# Patient Record
Sex: FEMALE | Race: WHITE | NOT HISPANIC OR LATINO | Employment: STUDENT | URBAN - METROPOLITAN AREA
[De-identification: names, ages, dates, MRNs, and addresses within clinical notes are randomized per-mention and may not be internally consistent; named-entity substitution may affect disease eponyms.]

---
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
## 2019-01-01 LAB
BACTERIA UR CULT: ABNORMAL
BACTERIA UR CULT: ABNORMAL
OTHER ANTIBIOTIC SUSC ISLT: ABNORMAL

## 2019-01-02 RX ORDER — AMOXICILLIN AND CLAVULANATE POTASSIUM 875; 125 MG/1; MG/1
1 TABLET, FILM COATED ORAL EVERY 12 HOURS
Qty: 20 TABLET | Refills: 0 | Status: SHIPPED | OUTPATIENT
Start: 2019-01-02 | End: 2019-01-12

## 2019-01-02 RX ORDER — NITROFURANTOIN 25; 75 MG/1; MG/1
100 CAPSULE ORAL NIGHTLY
Qty: 30 CAPSULE | Refills: 1 | Status: SHIPPED | OUTPATIENT
Start: 2019-01-02 | End: 2019-02-01

## 2019-01-02 NOTE — TELEPHONE ENCOUNTER
Also, her urine culture was positive and not sensitive to our usual antibiotic. I would recommend a prolonged course of aumgentin followed by nightly suppression as this is her second positive urine culture with Macrobid (rx sent) Will send Rx to pharmacy

## 2019-01-10 NOTE — TELEPHONE ENCOUNTER
Patient was informed of urine culture and medications instructions. Patient also informed of STD results, medication and instructions.

## 2019-01-25 ENCOUNTER — ROUTINE PRENATAL (OUTPATIENT)
Dept: OBSTETRICS AND GYNECOLOGY | Facility: CLINIC | Age: 19
End: 2019-01-25

## 2019-01-25 VITALS — WEIGHT: 112 LBS | DIASTOLIC BLOOD PRESSURE: 76 MMHG | BODY MASS INDEX: 21.87 KG/M2 | SYSTOLIC BLOOD PRESSURE: 119 MMHG

## 2019-01-25 DIAGNOSIS — R82.71 ASYMPTOMATIC BACTERIURIA DURING PREGNANCY: Primary | ICD-10-CM

## 2019-01-25 DIAGNOSIS — O99.891 ASYMPTOMATIC BACTERIURIA DURING PREGNANCY: Primary | ICD-10-CM

## 2019-01-25 PROCEDURE — 99213 OFFICE O/P EST LOW 20 MIN: CPT | Performed by: OBSTETRICS & GYNECOLOGY

## 2019-01-25 NOTE — PROGRESS NOTES
Chief Complaint   Patient presents with   • Routine Prenatal Visit      Julian Fleming is a 18 y.o.  at 26w0d   Patient denies vaginal bleeding, leakage of fluid.  denies contractions.  Notes normal fetal movements     /76   Wt 50.8 kg (112 lb)   LMP 2018 (Exact Date)   BMI 21.87 kg/m²    Abd: gravid, nontender  See OB Flowsheet    ASSESSMENT:   1. IUP at 26w0d   2. H/o UTI x2, on suppression  3. Chlamydia, plan MICHELE next visit  4. Low weight gain  PLAN:  Reviewed common second trimester symptoms.  Reviewed precautions for signs of  labor, anticipated fetal movements.   Reviewed kick counts starting at 28 weeks  GCT today  Tdap next visit  Reviewed recommended caloric intake in pregnancy/weight gain.  Fundal height is appropriate at this time  MICHELE for chlamydia next visit. Pt confirms that she has taken azithromycin and treatment for UTI.    Return in about 4 weeks (around 2019).  Orders Placed This Encounter   Procedures   • Urine Culture - Urine, Urine, Clean Catch

## 2019-01-26 LAB
ERYTHROCYTE [DISTWIDTH] IN BLOOD BY AUTOMATED COUNT: 14 % (ref 11.7–13)
GLUCOSE 1H P 50 G GLC PO SERPL-MCNC: 89 MG/DL (ref 65–139)
HCT VFR BLD AUTO: 31.4 % (ref 35.6–45.5)
HGB BLD-MCNC: 9.8 G/DL (ref 11.9–15.5)
MCH RBC QN AUTO: 30 PG (ref 26.9–32)
MCHC RBC AUTO-ENTMCNC: 31.2 G/DL (ref 32.4–36.3)
MCV RBC AUTO: 96 FL (ref 80.5–98.2)
PLATELET # BLD AUTO: 656 10*3/MM3 (ref 140–500)
RBC # BLD AUTO: 3.27 10*6/MM3 (ref 3.9–5.2)
WBC # BLD AUTO: 10.25 10*3/MM3 (ref 4.5–10.7)

## 2019-01-27 LAB
BACTERIA UR CULT: NO GROWTH
BACTERIA UR CULT: NORMAL

## 2019-01-29 ENCOUNTER — TELEPHONE (OUTPATIENT)
Dept: OBSTETRICS AND GYNECOLOGY | Facility: CLINIC | Age: 19
End: 2019-01-29

## 2019-01-29 RX ORDER — FERROUS SULFATE 325(65) MG
325 TABLET ORAL
Qty: 30 TABLET | Refills: 5 | Status: SHIPPED | OUTPATIENT
Start: 2019-01-29 | End: 2019-05-04 | Stop reason: HOSPADM

## 2019-01-29 RX ORDER — DOCUSATE SODIUM 100 MG/1
100 CAPSULE, LIQUID FILLED ORAL 2 TIMES DAILY
Qty: 60 CAPSULE | Refills: 1 | Status: SHIPPED | OUTPATIENT
Start: 2019-01-29 | End: 2019-04-18

## 2019-01-29 NOTE — TELEPHONE ENCOUNTER
Please let patient know that her GCT was normal but her blood count is a bit low. Recommend Iron supplementation. Rx sent.  Iron can be associated with constipation so I would recommend a stool softener which I will send to pharmacy as well.

## 2019-01-29 NOTE — TELEPHONE ENCOUNTER
----- Message from Pat Crowell MD sent at 1/29/2019  8:26 AM EST -----  Urine culture was negative but should continue daily antibiotics. Thanks!    01/29/19  Called patient to inform results, no answer. Left a message to return call.    -called pt again. Pt has been informed of urine cultures result and to continue abx. Pt also informed of gct and iron supplementation.

## 2019-02-06 ENCOUNTER — TELEPHONE (OUTPATIENT)
Dept: OBSTETRICS AND GYNECOLOGY | Facility: CLINIC | Age: 19
End: 2019-02-06

## 2019-02-06 RX ORDER — ONDANSETRON 4 MG/1
4 TABLET, FILM COATED ORAL DAILY PRN
Qty: 10 TABLET | Refills: 0 | Status: SHIPPED | OUTPATIENT
Start: 2019-02-06 | End: 2019-05-04 | Stop reason: HOSPADM

## 2019-02-06 NOTE — TELEPHONE ENCOUNTER
Patient's mother calling.  States patient is at work and feels like she's going to pass out.  Also complaining that her legs hurt and they are tingly.  Patient had a similar episode on kelly arcenio.  Please advise.

## 2019-02-06 NOTE — TELEPHONE ENCOUNTER
If pt is feeling badly, she should rest and hydrate.  Recommend sitting down for at least 20 minutes and drinking at least 12 oz of water.  Change from sitting position to standing in a slow fashion as if it is due to your blood pressure lowering rapid changes in position can worsen symptoms.  If she does not feel better, she may be offered an appt this afternoon or Friday or if symptoms merit immediate evaluation, go to L&D. Thanks!

## 2019-02-06 NOTE — TELEPHONE ENCOUNTER
Patient's mother aware and will pass along all information to patient.  Patient has been vomiting within the past two hours.  States nausea medication she was given is not helping - B6 and doxylamine I think?  Is there anything else she can try?

## 2019-02-08 ENCOUNTER — ROUTINE PRENATAL (OUTPATIENT)
Dept: OBSTETRICS AND GYNECOLOGY | Facility: CLINIC | Age: 19
End: 2019-02-08

## 2019-02-08 ENCOUNTER — PROCEDURE VISIT (OUTPATIENT)
Dept: OBSTETRICS AND GYNECOLOGY | Facility: CLINIC | Age: 19
End: 2019-02-08

## 2019-02-08 VITALS — DIASTOLIC BLOOD PRESSURE: 77 MMHG | BODY MASS INDEX: 21.68 KG/M2 | SYSTOLIC BLOOD PRESSURE: 127 MMHG | WEIGHT: 111 LBS

## 2019-02-08 DIAGNOSIS — Z34.03 ENCOUNTER FOR SUPERVISION OF NORMAL FIRST PREGNANCY IN THIRD TRIMESTER: Primary | ICD-10-CM

## 2019-02-08 DIAGNOSIS — O26.843 FUNDAL HEIGHT LOW FOR DATES IN THIRD TRIMESTER: Primary | ICD-10-CM

## 2019-02-08 PROCEDURE — 76816 OB US FOLLOW-UP PER FETUS: CPT | Performed by: OBSTETRICS & GYNECOLOGY

## 2019-02-08 PROCEDURE — 99213 OFFICE O/P EST LOW 20 MIN: CPT | Performed by: OBSTETRICS & GYNECOLOGY

## 2019-02-08 NOTE — PROGRESS NOTES
Chief Complaint   Patient presents with   • Pregnancy Problem     pt reports vomiting, light headed and hot.      HR: 128 Temp: 99.1 F    Julian Fleming is a 18 y.o.  at 28w0d here for problem visit in pregnancy secondary to nausea  Reports that she woke up day feeling ill.  She started throwing up on Wednesday and had decreased appetite.  Reports that she has no known sick contacts and no diarrhea.    Had temp of 101 yesterday but did not call or go for evaluation.  Feels much better today and yesterday compared to Wednesday. Drank lots of water yesterday and able to eat crackers  /77   Wt 50.3 kg (111 lb)   LMP 2018 (Exact Date)   BMI 21.68 kg/m²    Abd: gravid, nontender  See OB Flowsheet    ASSESSMENT:   1. IUP at 28w0d   2. Vomiting in pregnancy  3. Tachycardia, improved after rest with repeat HR of 96  PLAN:  Reviewed with patient given short duration of symptoms suspect this might be a viral gastroenteritis.  If she were to have worsening symptoms, fever, blood in emesis, inability to tolerate liquids or other concerning findings, recommend reevaluation.  Ultrasound today for size less than dates and discrepancy from last fundal height showed EFW of 36.2% but AC 13.4%, repeat in 4 weeks.  Pt counseled on hygiene precautions  Recheck Chlamydia at next visit  Tdap next visit  Reviewed common second trimester symptoms.  Reviewed precautions for signs of  labor, anticipated fetal movements.    Follow up as sheduled  No orders of the defined types were placed in this encounter.

## 2019-02-09 ENCOUNTER — HOSPITAL ENCOUNTER (EMERGENCY)
Facility: HOSPITAL | Age: 19
End: 2019-02-09

## 2019-02-09 ENCOUNTER — HOSPITAL ENCOUNTER (INPATIENT)
Facility: HOSPITAL | Age: 19
LOS: 3 days | Discharge: HOME | End: 2019-02-12
Attending: OBSTETRICS & GYNECOLOGY | Admitting: OBSTETRICS & GYNECOLOGY

## 2019-02-09 PROBLEM — Z34.90 PREGNANCY: Status: ACTIVE | Noted: 2019-02-09

## 2019-02-09 LAB
ALBUMIN SERPL-MCNC: 3.2 G/DL (ref 3.5–5.2)
ALBUMIN/GLOB SERPL: 1 G/DL
ALP SERPL-CCNC: 76 U/L (ref 39–117)
ALT SERPL W P-5'-P-CCNC: 6 U/L (ref 1–33)
AMPHET+METHAMPHET UR QL: NEGATIVE
ANION GAP SERPL CALCULATED.3IONS-SCNC: 13.7 MMOL/L
AST SERPL-CCNC: 9 U/L (ref 1–32)
BACTERIA UR QL AUTO: ABNORMAL /HPF
BARBITURATES UR QL SCN: NEGATIVE
BASOPHILS # BLD AUTO: 0.03 10*3/MM3 (ref 0–0.2)
BASOPHILS NFR BLD AUTO: 0.2 % (ref 0–1.5)
BENZODIAZ UR QL SCN: NEGATIVE
BILIRUB SERPL-MCNC: 0.3 MG/DL (ref 0.1–1.2)
BILIRUB UR QL STRIP: NEGATIVE
BUN BLD-MCNC: 5 MG/DL (ref 6–20)
BUN/CREAT SERPL: 10.4 (ref 7–25)
CALCIUM SPEC-SCNC: 8.3 MG/DL (ref 8.6–10.5)
CANNABINOIDS SERPL QL: NEGATIVE
CHLORIDE SERPL-SCNC: 97 MMOL/L (ref 98–107)
CLARITY UR: ABNORMAL
CO2 SERPL-SCNC: 22.3 MMOL/L (ref 22–29)
COCAINE UR QL: NEGATIVE
COLOR UR: ABNORMAL
CREAT BLD-MCNC: 0.48 MG/DL (ref 0.57–1)
DEPRECATED RDW RBC AUTO: 47.9 FL (ref 37–54)
EOSINOPHIL # BLD AUTO: 0.09 10*3/MM3 (ref 0–0.7)
EOSINOPHIL NFR BLD AUTO: 0.6 % (ref 0.3–6.2)
ERYTHROCYTE [DISTWIDTH] IN BLOOD BY AUTOMATED COUNT: 13.9 % (ref 11.7–13)
GFR SERPL CREATININE-BSD FRML MDRD: >150 ML/MIN/1.73
GLOBULIN UR ELPH-MCNC: 3.2 GM/DL
GLUCOSE BLD-MCNC: 82 MG/DL (ref 65–99)
GLUCOSE UR STRIP-MCNC: NEGATIVE MG/DL
HCT VFR BLD AUTO: 25.9 % (ref 35.6–45.5)
HGB BLD-MCNC: 8.2 G/DL (ref 11.9–15.5)
HGB UR QL STRIP.AUTO: NEGATIVE
HYALINE CASTS UR QL AUTO: ABNORMAL /LPF
IMM GRANULOCYTES # BLD AUTO: 0.09 10*3/MM3 (ref 0–0.03)
IMM GRANULOCYTES NFR BLD AUTO: 0.6 % (ref 0–0.5)
KETONES UR QL STRIP: ABNORMAL
LEUKOCYTE ESTERASE UR QL STRIP.AUTO: ABNORMAL
LYMPHOCYTES # BLD AUTO: 1.66 10*3/MM3 (ref 0.9–4.8)
LYMPHOCYTES NFR BLD AUTO: 10.4 % (ref 19.6–45.3)
MCH RBC QN AUTO: 29.8 PG (ref 26.9–32)
MCHC RBC AUTO-ENTMCNC: 31.7 G/DL (ref 32.4–36.3)
MCV RBC AUTO: 94.2 FL (ref 80.5–98.2)
METHADONE UR QL SCN: NEGATIVE
MONOCYTES # BLD AUTO: 2.22 10*3/MM3 (ref 0.2–1.2)
MONOCYTES NFR BLD AUTO: 14 % (ref 5–12)
NEUTROPHILS # BLD AUTO: 11.8 10*3/MM3 (ref 1.9–8.1)
NEUTROPHILS NFR BLD AUTO: 74.2 % (ref 42.7–76)
NITRITE UR QL STRIP: POSITIVE
OPIATES UR QL: NEGATIVE
OXYCODONE UR QL SCN: NEGATIVE
PH UR STRIP.AUTO: 6 [PH] (ref 5–8)
PLATELET # BLD AUTO: 209 10*3/MM3 (ref 140–500)
PMV BLD AUTO: 9.9 FL (ref 6–12)
POTASSIUM BLD-SCNC: 3.1 MMOL/L (ref 3.5–5.2)
PROT SERPL-MCNC: 6.4 G/DL (ref 6–8.5)
PROT UR QL STRIP: ABNORMAL
RBC # BLD AUTO: 2.75 10*6/MM3 (ref 3.9–5.2)
RBC # UR: ABNORMAL /HPF
REF LAB TEST METHOD: ABNORMAL
SODIUM BLD-SCNC: 133 MMOL/L (ref 136–145)
SP GR UR STRIP: 1.02 (ref 1–1.03)
SQUAMOUS #/AREA URNS HPF: ABNORMAL /HPF
UROBILINOGEN UR QL STRIP: ABNORMAL
WBC NRBC COR # BLD: 15.89 10*3/MM3 (ref 4.5–10.7)
WBC UR QL AUTO: ABNORMAL /HPF

## 2019-02-09 PROCEDURE — 80307 DRUG TEST PRSMV CHEM ANLYZR: CPT | Performed by: OBSTETRICS & GYNECOLOGY

## 2019-02-09 PROCEDURE — 87186 SC STD MICRODIL/AGAR DIL: CPT | Performed by: OBSTETRICS & GYNECOLOGY

## 2019-02-09 PROCEDURE — 87086 URINE CULTURE/COLONY COUNT: CPT | Performed by: OBSTETRICS & GYNECOLOGY

## 2019-02-09 PROCEDURE — 85025 COMPLETE CBC W/AUTO DIFF WBC: CPT | Performed by: OBSTETRICS & GYNECOLOGY

## 2019-02-09 PROCEDURE — G0378 HOSPITAL OBSERVATION PER HR: HCPCS

## 2019-02-09 PROCEDURE — 87077 CULTURE AEROBIC IDENTIFY: CPT | Performed by: OBSTETRICS & GYNECOLOGY

## 2019-02-09 PROCEDURE — 80053 COMPREHEN METABOLIC PANEL: CPT | Performed by: OBSTETRICS & GYNECOLOGY

## 2019-02-09 PROCEDURE — 81001 URINALYSIS AUTO W/SCOPE: CPT | Performed by: OBSTETRICS & GYNECOLOGY

## 2019-02-09 RX ORDER — SODIUM CHLORIDE 0.9 % (FLUSH) 0.9 %
5 SYRINGE (ML) INJECTION AS NEEDED
Status: DISCONTINUED | OUTPATIENT
Start: 2019-02-09 | End: 2019-02-12 | Stop reason: HOSPADM

## 2019-02-09 RX ORDER — SODIUM CHLORIDE 0.9 % (FLUSH) 0.9 %
3 SYRINGE (ML) INJECTION EVERY 12 HOURS SCHEDULED
Status: DISCONTINUED | OUTPATIENT
Start: 2019-02-10 | End: 2019-02-12 | Stop reason: HOSPADM

## 2019-02-09 RX ORDER — SODIUM CHLORIDE, SODIUM LACTATE, POTASSIUM CHLORIDE, CALCIUM CHLORIDE 600; 310; 30; 20 MG/100ML; MG/100ML; MG/100ML; MG/100ML
125 INJECTION, SOLUTION INTRAVENOUS CONTINUOUS
Status: DISCONTINUED | OUTPATIENT
Start: 2019-02-10 | End: 2019-02-11

## 2019-02-09 RX ADMIN — SODIUM CHLORIDE, POTASSIUM CHLORIDE, SODIUM LACTATE AND CALCIUM CHLORIDE 125 ML/HR: 600; 310; 30; 20 INJECTION, SOLUTION INTRAVENOUS at 23:38

## 2019-02-10 PROBLEM — Z34.90 PREGNANCY: Status: RESOLVED | Noted: 2019-02-09 | Resolved: 2019-02-10

## 2019-02-10 PROBLEM — O23.03 PYELONEPHRITIS AFFECTING PREGNANCY IN THIRD TRIMESTER: Status: ACTIVE | Noted: 2019-02-10

## 2019-02-10 LAB
ANION GAP SERPL CALCULATED.3IONS-SCNC: 14.5 MMOL/L
BASOPHILS # BLD AUTO: 0.03 10*3/MM3 (ref 0–0.2)
BASOPHILS NFR BLD AUTO: 0.2 % (ref 0–1.5)
BUN BLD-MCNC: 5 MG/DL (ref 6–20)
BUN/CREAT SERPL: 10.6 (ref 7–25)
CALCIUM SPEC-SCNC: 8.6 MG/DL (ref 8.6–10.5)
CHLORIDE SERPL-SCNC: 100 MMOL/L (ref 98–107)
CO2 SERPL-SCNC: 21.5 MMOL/L (ref 22–29)
CREAT BLD-MCNC: 0.47 MG/DL (ref 0.57–1)
DEPRECATED RDW RBC AUTO: 47.7 FL (ref 37–54)
EOSINOPHIL # BLD AUTO: 0.08 10*3/MM3 (ref 0–0.7)
EOSINOPHIL NFR BLD AUTO: 0.5 % (ref 0.3–6.2)
ERYTHROCYTE [DISTWIDTH] IN BLOOD BY AUTOMATED COUNT: 13.8 % (ref 11.7–13)
GFR SERPL CREATININE-BSD FRML MDRD: >150 ML/MIN/1.73
GLUCOSE BLD-MCNC: 72 MG/DL (ref 65–99)
HCT VFR BLD AUTO: 26.5 % (ref 35.6–45.5)
HGB BLD-MCNC: 8.7 G/DL (ref 11.9–15.5)
IMM GRANULOCYTES # BLD AUTO: 0.09 10*3/MM3 (ref 0–0.03)
IMM GRANULOCYTES NFR BLD AUTO: 0.6 % (ref 0–0.5)
LYMPHOCYTES # BLD AUTO: 1.26 10*3/MM3 (ref 0.9–4.8)
LYMPHOCYTES NFR BLD AUTO: 8.2 % (ref 19.6–45.3)
MCH RBC QN AUTO: 30.7 PG (ref 26.9–32)
MCHC RBC AUTO-ENTMCNC: 32.8 G/DL (ref 32.4–36.3)
MCV RBC AUTO: 93.6 FL (ref 80.5–98.2)
MONOCYTES # BLD AUTO: 1.68 10*3/MM3 (ref 0.2–1.2)
MONOCYTES NFR BLD AUTO: 10.9 % (ref 5–12)
NEUTROPHILS # BLD AUTO: 12.21 10*3/MM3 (ref 1.9–8.1)
NEUTROPHILS NFR BLD AUTO: 79.6 % (ref 42.7–76)
PLATELET # BLD AUTO: 229 10*3/MM3 (ref 140–500)
PMV BLD AUTO: 10.1 FL (ref 6–12)
POTASSIUM BLD-SCNC: 3.4 MMOL/L (ref 3.5–5.2)
RBC # BLD AUTO: 2.83 10*6/MM3 (ref 3.9–5.2)
SODIUM BLD-SCNC: 136 MMOL/L (ref 136–145)
WBC NRBC COR # BLD: 15.35 10*3/MM3 (ref 4.5–10.7)

## 2019-02-10 PROCEDURE — 25010000002 AMPICILLIN PER 500 MG: Performed by: OBSTETRICS & GYNECOLOGY

## 2019-02-10 PROCEDURE — 59025 FETAL NON-STRESS TEST: CPT

## 2019-02-10 PROCEDURE — 59025 FETAL NON-STRESS TEST: CPT | Performed by: OBSTETRICS & GYNECOLOGY

## 2019-02-10 PROCEDURE — 63710000001 DIPHENHYDRAMINE PER 50 MG: Performed by: OBSTETRICS & GYNECOLOGY

## 2019-02-10 PROCEDURE — 25010000003 CEFAZOLIN 1-4 GM/50ML-% SOLUTION: Performed by: OBSTETRICS & GYNECOLOGY

## 2019-02-10 PROCEDURE — 80048 BASIC METABOLIC PNL TOTAL CA: CPT | Performed by: OBSTETRICS & GYNECOLOGY

## 2019-02-10 PROCEDURE — 85025 COMPLETE CBC W/AUTO DIFF WBC: CPT | Performed by: OBSTETRICS & GYNECOLOGY

## 2019-02-10 PROCEDURE — 99233 SBSQ HOSP IP/OBS HIGH 50: CPT | Performed by: OBSTETRICS & GYNECOLOGY

## 2019-02-10 RX ORDER — POTASSIUM CHLORIDE 750 MG/1
40 CAPSULE, EXTENDED RELEASE ORAL EVERY 6 HOURS
Status: COMPLETED | OUTPATIENT
Start: 2019-02-10 | End: 2019-02-10

## 2019-02-10 RX ORDER — PRENATAL VIT NO.126/IRON/FOLIC 28MG-0.8MG
1 TABLET ORAL DAILY
Status: DISCONTINUED | OUTPATIENT
Start: 2019-02-10 | End: 2019-02-12 | Stop reason: HOSPADM

## 2019-02-10 RX ORDER — DIPHENHYDRAMINE HCL 25 MG
25 CAPSULE ORAL ONCE
Status: COMPLETED | OUTPATIENT
Start: 2019-02-10 | End: 2019-02-10

## 2019-02-10 RX ORDER — DOCUSATE SODIUM 100 MG/1
100 CAPSULE, LIQUID FILLED ORAL DAILY PRN
Status: DISCONTINUED | OUTPATIENT
Start: 2019-02-10 | End: 2019-02-12 | Stop reason: HOSPADM

## 2019-02-10 RX ORDER — DIPHENHYDRAMINE HCL 25 MG
25 CAPSULE ORAL EVERY 6 HOURS PRN
Status: DISCONTINUED | OUTPATIENT
Start: 2019-02-10 | End: 2019-02-12 | Stop reason: HOSPADM

## 2019-02-10 RX ORDER — DOXYLAMINE SUCCINATE AND PYRIDOXINE HYDROCHLORIDE, DELAYED RELEASE TABLETS 10 MG/10 MG 10; 10 MG/1; MG/1
2 TABLET, DELAYED RELEASE ORAL 3 TIMES DAILY PRN
Status: DISCONTINUED | OUTPATIENT
Start: 2019-02-10 | End: 2019-02-12 | Stop reason: HOSPADM

## 2019-02-10 RX ORDER — CEFAZOLIN SODIUM 1 G/50ML
1 INJECTION, SOLUTION INTRAVENOUS EVERY 8 HOURS
Status: DISCONTINUED | OUTPATIENT
Start: 2019-02-10 | End: 2019-02-12 | Stop reason: HOSPADM

## 2019-02-10 RX ORDER — FERROUS SULFATE 325(65) MG
325 TABLET ORAL
Status: DISCONTINUED | OUTPATIENT
Start: 2019-02-10 | End: 2019-02-12 | Stop reason: HOSPADM

## 2019-02-10 RX ORDER — ONDANSETRON 4 MG/1
4 TABLET, FILM COATED ORAL DAILY PRN
Status: DISCONTINUED | OUTPATIENT
Start: 2019-02-10 | End: 2019-02-12 | Stop reason: HOSPADM

## 2019-02-10 RX ORDER — SACCHAROMYCES BOULARDII 250 MG
250 CAPSULE ORAL 2 TIMES DAILY
Status: DISCONTINUED | OUTPATIENT
Start: 2019-02-10 | End: 2019-02-12 | Stop reason: HOSPADM

## 2019-02-10 RX ORDER — ACETAMINOPHEN 325 MG/1
650 TABLET ORAL EVERY 6 HOURS PRN
Status: DISCONTINUED | OUTPATIENT
Start: 2019-02-10 | End: 2019-02-12 | Stop reason: HOSPADM

## 2019-02-10 RX ADMIN — POTASSIUM CHLORIDE 40 MEQ: 750 CAPSULE, EXTENDED RELEASE ORAL at 17:56

## 2019-02-10 RX ADMIN — CEFAZOLIN SODIUM 1 G: 1 INJECTION, SOLUTION INTRAVENOUS at 13:05

## 2019-02-10 RX ADMIN — Medication 250 MG: at 15:20

## 2019-02-10 RX ADMIN — DIPHENHYDRAMINE HYDROCHLORIDE 25 MG: 25 CAPSULE ORAL at 22:22

## 2019-02-10 RX ADMIN — Medication 250 MG: at 21:22

## 2019-02-10 RX ADMIN — FERROUS SULFATE TAB 325 MG (65 MG ELEMENTAL FE) 325 MG: 325 (65 FE) TAB at 13:04

## 2019-02-10 RX ADMIN — POTASSIUM CHLORIDE 40 MEQ: 750 CAPSULE, EXTENDED RELEASE ORAL at 12:01

## 2019-02-10 RX ADMIN — DIPHENHYDRAMINE HYDROCHLORIDE 25 MG: 25 CAPSULE ORAL at 00:44

## 2019-02-10 RX ADMIN — SODIUM CHLORIDE, POTASSIUM CHLORIDE, SODIUM LACTATE AND CALCIUM CHLORIDE 125 ML/HR: 600; 310; 30; 20 INJECTION, SOLUTION INTRAVENOUS at 08:15

## 2019-02-10 RX ADMIN — SODIUM CHLORIDE, POTASSIUM CHLORIDE, SODIUM LACTATE AND CALCIUM CHLORIDE 125 ML/HR: 600; 310; 30; 20 INJECTION, SOLUTION INTRAVENOUS at 17:21

## 2019-02-10 RX ADMIN — AMPICILLIN SODIUM 2 G: 2 INJECTION, POWDER, FOR SOLUTION INTRAMUSCULAR; INTRAVENOUS at 06:06

## 2019-02-10 RX ADMIN — CEFAZOLIN SODIUM 1 G: 1 INJECTION, SOLUTION INTRAVENOUS at 21:22

## 2019-02-10 RX ADMIN — AMPICILLIN SODIUM 2 G: 2 INJECTION, POWDER, FOR SOLUTION INTRAMUSCULAR; INTRAVENOUS at 00:17

## 2019-02-10 RX ADMIN — Medication 1 TABLET: at 13:04

## 2019-02-10 NOTE — NON STRESS TEST
Julian Fleming, a  at 28w2d with an PILLO of 5/3/2019, by Last Menstrual Period, was seen at [facility] ANTEPARTUM UNIT for a nonstress test.    Chief Complaint   Patient presents with   • Abdominal Pain     lower R abd pain that began wednesday. pt reports pain is intermittant. denies LOF, vag bleeding, +FM        Patient Active Problem List   Diagnosis   • Normal pregnancy   • Migraine   • UTI in pregnancy   • Chlamydia   • Asymptomatic bacteriuria during pregnancy   • Pregnancy       Start Time:   Stop Time:     Interpretation A  Nonstress Test Interpretation A: Reactive (02/10/19 0036 : Delicia Alicea, RN)

## 2019-02-10 NOTE — PLAN OF CARE
Problem: Patient Care Overview  Goal: Plan of Care Review  Outcome: Ongoing (interventions implemented as appropriate)   02/10/19 0239   Coping/Psychosocial   Plan of Care Reviewed With patient;mother;significant other   Plan of Care Review   Progress no change   OTHER   Outcome Summary Pt admitted to antepartum service. IV ABX started. Orineted to unit.      Goal: Individualization and Mutuality  Outcome: Ongoing (interventions implemented as appropriate)   02/09/19 2347 02/10/19 0239   Individualization   Patient Specific Goals (Include Timeframe) --  Resolved pain.   Patient Specific Interventions --  IV ABX.    Mutuality/Individual Preferences   What Anxieties, Fears, Concerns, or Questions Do You Have About Your Care? [place] --      Goal: Discharge Needs Assessment  Outcome: Ongoing (interventions implemented as appropriate)   02/10/19 0239   Discharge Needs Assessment   Readmission Within the Last 30 Days no previous admission in last 30 days   Concerns to be Addressed no discharge needs identified   Patient/Family Anticipates Transition to home   Patient/Family Anticipated Services at Transition none   Transportation Concerns car, none   Transportation Anticipated family or friend will provide   Anticipated Changes Related to Illness none   Equipment Needed After Discharge none   Offered/Gave Vendor List no   Disability   Equipment Currently Used at Home none     Goal: Interprofessional Rounds/Family Conf  Outcome: Ongoing (interventions implemented as appropriate)   02/10/19 0239   Interdisciplinary Rounds/Family Conf   Participants nursing;family;patient;pharmacy;physician       Problem: Pain, Acute (Adult)  Goal: Identify Related Risk Factors and Signs and Symptoms  Outcome: Ongoing (interventions implemented as appropriate)   02/10/19 0239   Pain, Acute (Adult)   Related Risk Factors (Acute Pain) infection;persistent pain   Signs and Symptoms (Acute Pain) verbalization of pain descriptors     Goal:  Acceptable Pain Control/Comfort Level  Outcome: Ongoing (interventions implemented as appropriate)   02/10/19 0239   Pain, Acute (Adult)   Acceptable Pain Control/Comfort Level making progress toward outcome       Problem: Prenatal Patient, Hospitalized (Adult,Obstetrics,Pediatric)  Goal: Signs and Symptoms of Listed Potential Problems Will be Absent, Minimized or Managed (Prenatal Patient, Hospitalized)  Outcome: Ongoing (interventions implemented as appropriate)   02/10/19 0239   Goal/Outcome Evaluation   Problems Assessed (Prenatal Patient) all   Problems Present (Prenatal Patient) none

## 2019-02-10 NOTE — NON STRESS TEST
Julian Fleming, a  at 28w2d with an PILLO of 5/3/2019, by Last Menstrual Period, was seen at [facility] ANTEPARTUM UNIT for a nonstress test.    Chief Complaint   Patient presents with   • Abdominal Pain     lower R abd pain that began wednesday. pt reports pain is intermittant. denies LOF, vag bleeding, +FM        Patient Active Problem List   Diagnosis   • Normal pregnancy   • Migraine   • UTI in pregnancy   • Chlamydia   • Asymptomatic bacteriuria during pregnancy   • Pregnancy       Start Time:856 Stop Time: 946    Interpretation A  Nonstress Test Interpretation A: Reactive (02/10/19 0946 : Melisa Lehman, RN)

## 2019-02-10 NOTE — PROGRESS NOTES
Chief complaint: Abdominal pain  Subjective: Patient reports that her abdominal pain is currently improved.  She says it the pain had been in the right lower quadrant and radiating into the flank and into her back on the right side.  She denies fevers or chills.  She denies nausea and vomiting.  She states that she is very hungry this morning and she would like to eat.    O:  Vitals:    02/10/19 1158   BP: 108/58   Pulse: 95   Resp: 16   Temp: 98.5 °F (36.9 °C)   SpO2: 99%     Gen.: No acute distress, awake and oriented ×3  HEENT: Normocephalic, atraumatic, moist because membranes  Abdomen: Soft, nontender, nondistended, gravid, there is positive right CVA tenderness, there is no left CVA tenderness  Extremities: No lower extremity edema, no tenderness  Psychiatric: Good judgment and insight, normal affect and mood    NST: 130s/reactive/moderate variability/no decelerations  Tocometry: Patient is having some contractions about every 10-15 minutes    .  Lab Results (last 24 hours)     Procedure Component Value Units Date/Time    Urine Culture - Urine, Urine, Clean Catch [[id number]]  (Normal) Collected:  02/09/19 2159    Specimen:  Urine, Clean Catch Updated:  02/10/19 1135     Urine Culture Culture in progress    Comprehensive Metabolic Panel [[id number]]  (Abnormal) Collected:  02/09/19 2151    Specimen:  Blood from Arm, Right Updated:  02/09/19 2233     Glucose 82 mg/dL      BUN 5 mg/dL      Creatinine 0.48 mg/dL      Sodium 133 mmol/L      Potassium 3.1 mmol/L      Chloride 97 mmol/L      CO2 22.3 mmol/L      Calcium 8.3 mg/dL      Total Protein 6.4 g/dL      Albumin 3.20 g/dL      ALT (SGPT) 6 U/L      AST (SGOT) 9 U/L      Alkaline Phosphatase 76 U/L      Total Bilirubin 0.3 mg/dL      eGFR Non African Amer >150 mL/min/1.73      Globulin 3.2 gm/dL      A/G Ratio 1.0 g/dL      BUN/Creatinine Ratio 10.4     Anion Gap 13.7 mmol/L     URINE DRUG SCREEN PLUS BUPRENORPHINE - [[id number]] Collected:  02/09/19 2159      Updated:  02/09/19 2232    Narrative:       The following orders were created for panel order URINE DRUG SCREEN PLUS BUPRENORPHINE -.  Procedure                               Abnormality         Status                     ---------                               -----------         ------                     Urine Drug Screen - Urin...[[id number]]  Normal              Final result               Buprenorphine Screen Uri...[[id number]]                      In process                   Please view results for these tests on the individual orders.    Urine Drug Screen - Urine, Clean Catch [[id number]]  (Normal) Collected:  02/09/19 2159    Specimen:  Urine, Clean Catch Updated:  02/09/19 2232     Amphet/Methamphet, Screen Negative     Barbiturates Screen, Urine Negative     Benzodiazepine Screen, Urine Negative     Cocaine Screen, Urine Negative     Opiate Screen Negative     THC, Screen, Urine Negative     Methadone Screen, Urine Negative     Oxycodone Screen, Urine Negative    Narrative:       Negative Thresholds For Drugs Screened:     Amphetamines               500 ng/ml   Barbiturates               200 ng/ml   Benzodiazepines            100 ng/ml   Cocaine                    300 ng/ml   Methadone                  300 ng/ml   Opiates                    300 ng/ml   Oxycodone                  100 ng/ml   THC                        50 ng/ml    The Normal Value for all drugs tested is negative. This report includes final unconfirmed screening results to be used for medical treatment purposes only. Unconfirmed results must not be used for non-medical purposes such as employment or legal testing. Clinical consideration should be applied to any drug of abuse test, particulary when unconfirmed results are used.    Urinalysis, Microscopic Only - Urine, Clean Catch [[id number]]  (Abnormal) Collected:  02/09/19 2159    Specimen:  Urine, Clean Catch Updated:  02/09/19 2212     RBC, UA 0-2 /HPF      WBC, UA Too Numerous to Count /HPF       Bacteria, UA 4+ /HPF      Squamous Epithelial Cells, UA 0-2 /HPF      Hyaline Casts, UA 7-12 /LPF      Methodology Automated Microscopy    Urinalysis With Microscopic If Indicated (No Culture) - Urine, Clean Catch [[id number]]  (Abnormal) Collected:  02/09/19 2159    Specimen:  Urine, Clean Catch Updated:  02/09/19 2211     Color, UA Dark Yellow     Appearance, UA Cloudy     pH, UA 6.0     Specific Gravity, UA 1.021     Glucose, UA Negative     Ketones, UA 40 mg/dL (2+)     Bilirubin, UA Negative     Blood, UA Negative     Protein, UA Trace     Leuk Esterase, UA Large (3+)     Nitrite, UA Positive     Urobilinogen, UA 1.0 E.U./dL    CBC & Differential [[id number]] Collected:  02/09/19 2151    Specimen:  Blood Updated:  02/09/19 2209    Narrative:       The following orders were created for panel order CBC & Differential.  Procedure                               Abnormality         Status                     ---------                               -----------         ------                     CBC Auto Differential[[id number]]        Abnormal            Final result                 Please view results for these tests on the individual orders.    CBC Auto Differential [[id number]]  (Abnormal) Collected:  02/09/19 2151    Specimen:  Blood from Arm, Right Updated:  02/09/19 2209     WBC 15.89 10*3/mm3      RBC 2.75 10*6/mm3      Hemoglobin 8.2 g/dL      Hematocrit 25.9 %      MCV 94.2 fL      MCH 29.8 pg      MCHC 31.7 g/dL      RDW 13.9 %      RDW-SD 47.9 fl      MPV 9.9 fL      Platelets 209 10*3/mm3      Neutrophil % 74.2 %      Lymphocyte % 10.4 %      Monocyte % 14.0 %      Eosinophil % 0.6 %      Basophil % 0.2 %      Immature Grans % 0.6 %      Neutrophils, Absolute 11.80 10*3/mm3      Lymphocytes, Absolute 1.66 10*3/mm3      Monocytes, Absolute 2.22 10*3/mm3      Eosinophils, Absolute 0.09 10*3/mm3      Basophils, Absolute 0.03 10*3/mm3      Immature Grans, Absolute 0.09 10*3/mm3     Buprenorphine Screen Urine  - Urine, Clean Catch [[id number]] Collected:  19    Specimen:  Urine, Clean Catch Updated:  19        Urine culture from yesterday: Pending    Pt has previously had urine cultures on 10/1, 10/29, ,  that were all positive for Klebsiella pneumoniae, which was resistant to ampicillin and intermediate resistant to nitrofurantoin.  Patient did have a negative urine culture on 19    Assessment:  1.  18-year-old  1 at 28-2/7 weeks gestational age with right-sided pyelonephritis  2.  Hypokalemia    Plan:  1.  Patient was started on ampicillin upon admission, likely secondary to her stated allergies to cephalosporins; however, she has had 4 positive urine cultures this pregnancy that all showed Klebsiella which was resistant to ampicillin.  Her urine cultures are currently in progress, but it would be most likely that they would be positive for Klebsiella again, and it would be most likely resistant to ampicillin as all of her previous urine cultures have been.  As such, I feel we need to change her antibiotics.  I had a long conversation with the patient about her stated allergies.  The patient states that she does not have any personal recollection of her reaction to the medications.  She states that she was told this by her mother.  In fact, she states that one of her stated allergies actually happened to her sister, but her mother cannot remember which child had which reaction, so apparently she has told both of them to state that she is allergic to both cefprozil and cefdinir.  Patient states that supposedly one of these caused abdominal discomfort and one of these caused a rash, but again one of these reactions actually happened to her sister and not her.  Unfortunately, based on the previous urine cultures, we have limited choices as to appropriate antibiotics to treat this patient with, if she were to allergic to cephalosporins.  Based on this spurious history, I feel that  the chances of the significant anaphylactic reaction to cephalosporins is quite low.  Patient has previously taken amoxicillin/clavulanic acid, but there is conflicting literature as to whether this medication may increase the risk of necrotizing enterocolitis in newborns, especially premature newborns.  One study showed about a 2 fold increased risk from 2% to 4%.  Another study did not replicate these findings.  Additionally, fluoroquinolones have historically been associated with fetal malformations and Deng agenesis in animal studies, but these have not been replicated human studies.  Given a full evaluation of the proceeding considerations, I feel that the best option would be to try cephalosporins for treatment of her pyelonephritis.  The patient's previously reported adverse reactions to cephalosporins including a rash, which may not have been secondary to the medication at all but may have been secondary to the primary disease process and GI discomfort, which also may not have been directly related to the medication.  Cephalosporins can cause GI discomfort or diarrhea, but we can help try to prevent this with probiotics and supportive care.  I feel that the chance of a true anaphylactic reaction would be low, and when considering the potential adverse fetal effects of the alternative medications, I believe that the risk benefit analysis weighs most in favor with proceeding with cephalosporins at this time.  2.  Repeat labs today.  We will also replace potassium.    The plan of care was discussed with the patient at length.  I explained my reasoning for antibiotic selection.  I also gave the patient the option of whether we would use amoxicillin/clavulanic acid versus cephalosporins, and she would prefer to proceed with cephalosporins as well.    I spent 35 minutes on the floor in the care of this patient (reviewing the chart, consult other physicians, direct patient care), with greater than 50 percent this  time in direct counseling/coordination with the patient and her family.

## 2019-02-10 NOTE — PLAN OF CARE
Problem: Patient Care Overview  Goal: Plan of Care Review  Outcome: Ongoing (interventions implemented as appropriate)   02/10/19 0239 02/10/19 0640   Coping/Psychosocial   Plan of Care Reviewed With patient;mother;significant other --    Plan of Care Review   Progress no change --    OTHER   Outcome Summary --  IV ABX continue per MD orders. Pt remains NPO. Pt encourgaged to rest and notify RN if pain intensifies or when she needs medication for pain. MD to assess this AM.     Goal: Individualization and Mutuality  Outcome: Ongoing (interventions implemented as appropriate)   02/09/19 2347 02/10/19 0239   Individualization   Patient Specific Goals (Include Timeframe) --  Resolved pain.   Patient Specific Interventions --  IV ABX.    Mutuality/Individual Preferences   What Anxieties, Fears, Concerns, or Questions Do You Have About Your Care? [place] [facility] --       02/09/19 2347 02/10/19 0239   Individualization   Patient Specific Goals (Include Timeframe) --  Resolved pain.   Patient Specific Interventions --  IV ABX.    Mutuality/Individual Preferences   What Anxieties, Fears, Concerns, or Questions Do You Have About Your Care? [facility] --      Goal: Discharge Needs Assessment   02/10/19 0239   Discharge Needs Assessment   Readmission Within the Last 30 Days no previous admission in last 30 days   Concerns to be Addressed no discharge needs identified   Patient/Family Anticipates Transition to home   Patient/Family Anticipated Services at Transition none   Transportation Concerns car, none   Transportation Anticipated family or friend will provide   Anticipated Changes Related to Illness none   Equipment Needed After Discharge none   Offered/Gave Vendor List no   Disability   Equipment Currently Used at Home none       Problem: Pain, Acute (Adult)  Goal: Identify Related Risk Factors and Signs and Symptoms  Outcome: Ongoing (interventions implemented as appropriate)   02/10/19 0239   Pain, Acute (Adult)   Related Risk  Factors (Acute Pain) infection;persistent pain   Signs and Symptoms (Acute Pain) verbalization of pain descriptors     Goal: Acceptable Pain Control/Comfort Level  Outcome: Ongoing (interventions implemented as appropriate)   02/10/19 0239   Pain, Acute (Adult)   Acceptable Pain Control/Comfort Level making progress toward outcome       Problem: Prenatal Patient, Hospitalized (Adult,Obstetrics,Pediatric)  Goal: Signs and Symptoms of Listed Potential Problems Will be Absent, Minimized or Managed (Prenatal Patient, Hospitalized)   02/10/19 0965   Goal/Outcome Evaluation   Problems Assessed (Prenatal Patient) all   Problems Present (Prenatal Patient) none

## 2019-02-10 NOTE — NON STRESS TEST
Julian Fleming, a  at 28w2d with an PILLO of 5/3/2019, by Last Menstrual Period, was seen at [facility] ANTEPARTUM UNIT for a nonstress test.    Chief Complaint   Patient presents with   • Abdominal Pain     lower R abd pain that began wednesday. pt reports pain is intermittant. denies LOF, vag bleeding, +FM        Patient Active Problem List   Diagnosis   • Normal pregnancy   • Migraine   • UTI in pregnancy   • Chlamydia   • Asymptomatic bacteriuria during pregnancy   • Pyelonephritis affecting pregnancy in third trimester       Start Time:  Stop Time:   Patient back on monitors due to reported DFM this evening, [facility] restarted when placed on monitors and were now completed in 25 min.  Active FM audible on EFM and pt feeling active mvmt now.  Interpretation A  Nonstress Test Interpretation A: Reactive (02/10/19 1819 : Melisa Lehman, RN)

## 2019-02-10 NOTE — H&P
Patient Care Team:  Sorin Mcfadden MD as PCP - General (Family Medicine)    Chief complaint - Abdominal pain in pregnancy    Subjective     History of Present Illness -  Patient is a 18 year old primigravid at 28+ weeks who presented with lower abdominal pain, mainly on right and midline.  Patient also with back pain, midline to right as well.  Pain has gotten worse and she has not had an appetite over past 12 to 24 hours.  Patient attempted to eat soup at 5:30 tonite and did not finish it due to nausea.  She feels her issues began on Wednesday with the initial development of nausea and a fever.  At home on Wednesday, she took her temperature and it was 101.  Since then, however, she has not noted fever; rather, she has had progressive decrease in appetite and lower abdominal pain.  She reports active fetal movement.  Denies leakage or vaginal bleeding.    Review of Systems   Constitutional: Positive for appetite change and fever. Negative for chills.   HENT: Negative for dental problem and hearing loss.    Eyes: Negative for photophobia and visual disturbance.   Respiratory: Negative for shortness of breath and wheezing.    Cardiovascular: Negative for chest pain and palpitations.   Gastrointestinal: Positive for abdominal pain and nausea.   Genitourinary: Positive for flank pain. Negative for dysuria, frequency and vaginal bleeding.   Musculoskeletal: Positive for back pain. Negative for gait problem.   Skin: Negative for pallor and rash.   Neurological: Negative for tremors and weakness.   Hematological: Negative for adenopathy. Does not bruise/bleed easily.   Psychiatric/Behavioral: Negative for behavioral problems and confusion.        Past Medical History:   Diagnosis Date   • Migraine      No past surgical history on file.  Family History   Problem Relation Age of Onset   • Breast cancer Neg Hx    • Ovarian cancer Neg Hx    • Uterine cancer Neg Hx    • Colon cancer Neg Hx    • Deep vein thrombosis  Neg Hx    • Pulmonary embolism Neg Hx      Social History     Tobacco Use   • Smoking status: Never Smoker   • Smokeless tobacco: Never Used   Substance Use Topics   • Alcohol use: No     Comment: quit when finding out was pregnant   • Drug use: No     Medications Prior to Admission   Medication Sig Dispense Refill Last Dose   • ferrous sulfate 325 (65 FE) MG tablet Take 1 tablet by mouth Daily With Breakfast. 30 tablet 5 2/8/2019 at Unknown time   • ondansetron (ZOFRAN) 4 MG tablet Take 1 tablet by mouth Daily As Needed for Nausea or Vomiting. 10 tablet 0 2/8/2019 at Unknown time   • Prenatal Vit-Fe Fumarate-FA (PRENATAL VITAMIN) 27-0.8 MG tablet Take 1 tablet by mouth Daily. 30 tablet 11 2/8/2019 at Unknown time   • vitamin B-6 (PYRIDOXINE) 25 MG tablet Take 1 tablet by mouth 3 (Three) Times a Day. 90 tablet 2 2/8/2019 at Unknown time   • WAL-KIERA 25 MG tablet TK 1/2 T PO QHS AS NEEDED FOR N  2 2/8/2019 at Unknown time   • docusate sodium (COLACE) 100 MG capsule Take 1 capsule by mouth 2 (Two) Times a Day. (Patient not taking: Reported on 2/9/2019) 60 capsule 1 Not Taking at Unknown time     Allergies:  Cefzil [cefprozil]; Cephalosporins; and Omnicef [cefdinir]    Objective      Vital Signs  Temp:  [99.1 °F (37.3 °C)] 99.1 °F (37.3 °C)  Heart Rate:  [] 97  Resp:  [18] 18  BP: (117-122)/(66-70) 122/66    Physical Exam   Constitutional: She appears well-developed and well-nourished.   HENT:   Right Ear: External ear normal.   Left Ear: External ear normal.   Nose: Nose normal.   Eyes: Conjunctivae are normal.   Neck: Normal range of motion. Neck supple. No thyromegaly present.   Cardiovascular: Normal rate, regular rhythm and normal heart sounds.   Pulmonary/Chest: Effort normal. No stridor. She has no wheezes.   Abdominal: Soft. There is no tenderness. There is no rebound and no guarding.   Musculoskeletal: Normal range of motion. She exhibits no edema.   Neurological: She is alert. Coordination normal.    Skin: Skin is warm and dry. She is not diaphoretic.   Psychiatric: She has a normal mood and affect. Her behavior is normal. Judgment and thought content normal.   Vitals reviewed.      Results Review:  WBC   Date Value Ref Range Status   02/09/2019 15.89 (H) 4.50 - 10.70 10*3/mm3 Final     RBC   Date Value Ref Range Status   02/09/2019 2.75 (L) 3.90 - 5.20 10*6/mm3 Final     Hemoglobin   Date Value Ref Range Status   02/09/2019 8.2 (L) 11.9 - 15.5 g/dL Final     Hematocrit   Date Value Ref Range Status   02/09/2019 25.9 (L) 35.6 - 45.5 % Final     MCV   Date Value Ref Range Status   02/09/2019 94.2 80.5 - 98.2 fL Final     MCH   Date Value Ref Range Status   02/09/2019 29.8 26.9 - 32.0 pg Final     MCHC   Date Value Ref Range Status   02/09/2019 31.7 (L) 32.4 - 36.3 g/dL Final     RDW   Date Value Ref Range Status   02/09/2019 13.9 (H) 11.7 - 13.0 % Final     RDW-SD   Date Value Ref Range Status   02/09/2019 47.9 37.0 - 54.0 fl Final     MPV   Date Value Ref Range Status   02/09/2019 9.9 6.0 - 12.0 fL Final     Platelets   Date Value Ref Range Status   02/09/2019 209 140 - 500 10*3/mm3 Final     Neutrophil %   Date Value Ref Range Status   02/09/2019 74.2 42.7 - 76.0 % Final     Lymphocyte %   Date Value Ref Range Status   02/09/2019 10.4 (L) 19.6 - 45.3 % Final     Monocyte %   Date Value Ref Range Status   02/09/2019 14.0 (H) 5.0 - 12.0 % Final     Eosinophil %   Date Value Ref Range Status   02/09/2019 0.6 0.3 - 6.2 % Final     Basophil %   Date Value Ref Range Status   02/09/2019 0.2 0.0 - 1.5 % Final     Immature Grans %   Date Value Ref Range Status   02/09/2019 0.6 (H) 0.0 - 0.5 % Final     Neutrophils, Absolute   Date Value Ref Range Status   02/09/2019 11.80 (H) 1.90 - 8.10 10*3/mm3 Final     Lymphocytes, Absolute   Date Value Ref Range Status   02/09/2019 1.66 0.90 - 4.80 10*3/mm3 Final     Monocytes, Absolute   Date Value Ref Range Status   02/09/2019 2.22 (H) 0.20 - 1.20 10*3/mm3 Final      Eosinophils, Absolute   Date Value Ref Range Status   02/09/2019 0.09 0.00 - 0.70 10*3/mm3 Final     Basophils, Absolute   Date Value Ref Range Status   02/09/2019 0.03 0.00 - 0.20 10*3/mm3 Final     Immature Grans, Absolute   Date Value Ref Range Status   02/09/2019 0.09 (H) 0.00 - 0.03 10*3/mm3 Final     Lab Results   Component Value Date    GLUCOSE 82 02/09/2019    BUN 5 (L) 02/09/2019    CREATININE 0.48 (L) 02/09/2019    EGFRIFNONA >150 02/09/2019    BCR 10.4 02/09/2019    K 3.1 (L) 02/09/2019    CO2 22.3 02/09/2019    CALCIUM 8.3 (L) 02/09/2019    ALBUMIN 3.20 (L) 02/09/2019    AST 9 02/09/2019    ALT 6 02/09/2019     Brief Urine Lab Results  (Last result in the past 365 days)      Color   Clarity   Blood   Leuk Est   Nitrite   Protein   CREAT   Urine HCG        02/09/19 2159 Dark Yellow Cloudy Negative Large (3+) Positive Trace             NST - Category 1    Assessment/Plan       Pregnancy      Assessment & Plan  IUP at 28 weeks with lower abdominal/back pain.  - Differential diagnosis includes appendicitis and pyelonephritis.  Patient has elevated WBC but is afebrile.  She complains of abdominal pain, but does not have an acute abdomen.  She has some mild tenderness to palpation of abdomen and back.  Her urine shows minimal squamous cells but 4+ bacteria, large leukocytes, nitrites and WBC.  Given UTI is more common in pregnancy, plan to treat with IV antibiotics and watch closely with serial abdominal exams.  If no improvement or worsening of symptoms, consider surgical consult.  I feel that if she has an antepartum UTI, she should begin improving after a few doses of IV antibiotics.   Will keep NPO for now and repeat CBC in morning.  Urine sent for culture.  I explained that both pyelonephritis and appendicitis in pregnancy have a high risk of complications, particularly if suboptimal treatment occurs.  She will need close monitoring over the next 24 hours.  - Fetal status reassuring.  Will admit to  antepartum unit.    I discussed the patients findings and my recommendations with patient, family and nursing staff    Douglas Rodriguez MD  02/09/19  11:25 PM

## 2019-02-11 PROBLEM — O99.019 ANEMIA DURING PREGNANCY: Status: ACTIVE | Noted: 2019-02-11

## 2019-02-11 LAB
ANION GAP SERPL CALCULATED.3IONS-SCNC: 9.9 MMOL/L
BASOPHILS # BLD AUTO: 0.03 10*3/MM3 (ref 0–0.2)
BASOPHILS NFR BLD AUTO: 0.3 % (ref 0–1.5)
BUN BLD-MCNC: 4 MG/DL (ref 6–20)
BUN/CREAT SERPL: 9.1 (ref 7–25)
CALCIUM SPEC-SCNC: 8.3 MG/DL (ref 8.6–10.5)
CHLORIDE SERPL-SCNC: 104 MMOL/L (ref 98–107)
CO2 SERPL-SCNC: 23.1 MMOL/L (ref 22–29)
CREAT BLD-MCNC: 0.44 MG/DL (ref 0.57–1)
DEPRECATED RDW RBC AUTO: 48.6 FL (ref 37–54)
EOSINOPHIL # BLD AUTO: 0.13 10*3/MM3 (ref 0–0.7)
EOSINOPHIL NFR BLD AUTO: 1.3 % (ref 0.3–6.2)
ERYTHROCYTE [DISTWIDTH] IN BLOOD BY AUTOMATED COUNT: 13.9 % (ref 11.7–13)
GFR SERPL CREATININE-BSD FRML MDRD: >150 ML/MIN/1.73
GLUCOSE BLD-MCNC: 77 MG/DL (ref 65–99)
HCT VFR BLD AUTO: 25.1 % (ref 35.6–45.5)
HGB BLD-MCNC: 8.1 G/DL (ref 11.9–15.5)
IMM GRANULOCYTES # BLD AUTO: 0.04 10*3/MM3 (ref 0–0.03)
IMM GRANULOCYTES NFR BLD AUTO: 0.4 % (ref 0–0.5)
LYMPHOCYTES # BLD AUTO: 1.85 10*3/MM3 (ref 0.9–4.8)
LYMPHOCYTES NFR BLD AUTO: 18.4 % (ref 19.6–45.3)
MCH RBC QN AUTO: 30.6 PG (ref 26.9–32)
MCHC RBC AUTO-ENTMCNC: 32.3 G/DL (ref 32.4–36.3)
MCV RBC AUTO: 94.7 FL (ref 80.5–98.2)
MONOCYTES # BLD AUTO: 1.1 10*3/MM3 (ref 0.2–1.2)
MONOCYTES NFR BLD AUTO: 10.9 % (ref 5–12)
NEUTROPHILS # BLD AUTO: 6.93 10*3/MM3 (ref 1.9–8.1)
NEUTROPHILS NFR BLD AUTO: 68.7 % (ref 42.7–76)
PLATELET # BLD AUTO: 234 10*3/MM3 (ref 140–500)
PMV BLD AUTO: 9.6 FL (ref 6–12)
POTASSIUM BLD-SCNC: 4.1 MMOL/L (ref 3.5–5.2)
RBC # BLD AUTO: 2.65 10*6/MM3 (ref 3.9–5.2)
SODIUM BLD-SCNC: 137 MMOL/L (ref 136–145)
WBC NRBC COR # BLD: 10.08 10*3/MM3 (ref 4.5–10.7)

## 2019-02-11 PROCEDURE — 85025 COMPLETE CBC W/AUTO DIFF WBC: CPT | Performed by: OBSTETRICS & GYNECOLOGY

## 2019-02-11 PROCEDURE — 59025 FETAL NON-STRESS TEST: CPT | Performed by: OBSTETRICS & GYNECOLOGY

## 2019-02-11 PROCEDURE — 80048 BASIC METABOLIC PNL TOTAL CA: CPT | Performed by: OBSTETRICS & GYNECOLOGY

## 2019-02-11 PROCEDURE — 63710000001 DIPHENHYDRAMINE PER 50 MG: Performed by: OBSTETRICS & GYNECOLOGY

## 2019-02-11 PROCEDURE — 59025 FETAL NON-STRESS TEST: CPT

## 2019-02-11 PROCEDURE — 25010000003 CEFAZOLIN 1-4 GM/50ML-% SOLUTION: Performed by: OBSTETRICS & GYNECOLOGY

## 2019-02-11 PROCEDURE — 99232 SBSQ HOSP IP/OBS MODERATE 35: CPT | Performed by: OBSTETRICS & GYNECOLOGY

## 2019-02-11 RX ADMIN — CEFAZOLIN SODIUM 1 G: 1 INJECTION, SOLUTION INTRAVENOUS at 13:10

## 2019-02-11 RX ADMIN — SODIUM CHLORIDE, POTASSIUM CHLORIDE, SODIUM LACTATE AND CALCIUM CHLORIDE 125 ML/HR: 600; 310; 30; 20 INJECTION, SOLUTION INTRAVENOUS at 01:20

## 2019-02-11 RX ADMIN — CEFAZOLIN SODIUM 1 G: 1 INJECTION, SOLUTION INTRAVENOUS at 05:35

## 2019-02-11 RX ADMIN — CEFAZOLIN SODIUM 1 G: 1 INJECTION, SOLUTION INTRAVENOUS at 21:30

## 2019-02-11 RX ADMIN — Medication 250 MG: at 21:29

## 2019-02-11 RX ADMIN — FERROUS SULFATE TAB 325 MG (65 MG ELEMENTAL FE) 325 MG: 325 (65 FE) TAB at 08:06

## 2019-02-11 RX ADMIN — Medication 250 MG: at 08:48

## 2019-02-11 RX ADMIN — SODIUM CHLORIDE, PRESERVATIVE FREE 3 ML: 5 INJECTION INTRAVENOUS at 21:31

## 2019-02-11 RX ADMIN — Medication 1 TABLET: at 08:48

## 2019-02-11 RX ADMIN — DIPHENHYDRAMINE HYDROCHLORIDE 25 MG: 25 CAPSULE ORAL at 21:29

## 2019-02-11 NOTE — PROGRESS NOTES
Chief complaint: Abdominal pain  Subjective: Patient notes marked improvement in her right-sided abdominal pain and back pain.  No fevers or chills.  No nausea or vomiting.  She is tolerating a regular diet.  Normal fetal movement.    O:  Vitals:    02/10/19 1158 02/10/19 1600 02/10/19 2023 02/11/19 0808   BP: 108/58 102/58 123/79 104/49   BP Location: Left arm Left arm Left arm Left arm   Patient Position: Lying Lying Lying Sitting   Pulse: 95 98 90 64   Resp: 16 18 18 16   Temp: 98.5 °F (36.9 °C) 97.8 °F (36.6 °C) 98.5 °F (36.9 °C) 97.8 °F (36.6 °C)   TempSrc: Oral Oral Oral Oral   SpO2: 99% 98% 99% 100%   Weight:       Height:          Gen.: No acute distress, awake and oriented ×3  HEENT: Normocephalic, atraumatic, moist mucous membranes  Abdomen: Soft, nondistended, nontender, gravid, still minimal right CVA tenderness noted, but improved from yesterday, no left CVA tenderness  Extremities: No edema, no tenderness  Psychiatric: Good judgment and insight, normal affect and mood    Lab Results (last 24 hours)     Procedure Component Value Units Date/Time    Basic Metabolic Panel [[id number]]  (Abnormal) Collected:  02/11/19 0541    Specimen:  Blood Updated:  02/11/19 0622     Glucose 77 mg/dL      BUN 4 mg/dL      Creatinine 0.44 mg/dL      Sodium 137 mmol/L      Potassium 4.1 mmol/L      Chloride 104 mmol/L      CO2 23.1 mmol/L      Calcium 8.3 mg/dL      eGFR Non African Amer >150 mL/min/1.73      BUN/Creatinine Ratio 9.1     Anion Gap 9.9 mmol/L     Narrative:       GFR Normal >60  Chronic Kidney Disease <60  Kidney Failure <15    CBC & Differential [[id number]] Collected:  02/11/19 0541    Specimen:  Blood Updated:  02/11/19 0559    Narrative:       The following orders were created for panel order CBC & Differential.  Procedure                               Abnormality         Status                     ---------                               -----------         ------                     CBC Auto  Differential[[id number]]        Abnormal            Final result                 Please view results for these tests on the individual orders.    CBC Auto Differential [[id number]]  (Abnormal) Collected:  02/11/19 0541    Specimen:  Blood Updated:  02/11/19 0559     WBC 10.08 10*3/mm3      RBC 2.65 10*6/mm3      Hemoglobin 8.1 g/dL      Hematocrit 25.1 %      MCV 94.7 fL      MCH 30.6 pg      MCHC 32.3 g/dL      RDW 13.9 %      RDW-SD 48.6 fl      MPV 9.6 fL      Platelets 234 10*3/mm3      Neutrophil % 68.7 %      Lymphocyte % 18.4 %      Monocyte % 10.9 %      Eosinophil % 1.3 %      Basophil % 0.3 %      Immature Grans % 0.4 %      Neutrophils, Absolute 6.93 10*3/mm3      Lymphocytes, Absolute 1.85 10*3/mm3      Monocytes, Absolute 1.10 10*3/mm3      Eosinophils, Absolute 0.13 10*3/mm3      Basophils, Absolute 0.03 10*3/mm3      Immature Grans, Absolute 0.04 10*3/mm3     Basic Metabolic Panel [[id number]]  (Abnormal) Collected:  02/10/19 1206    Specimen:  Blood from Arm, Left Updated:  02/10/19 1314     Glucose 72 mg/dL      BUN 5 mg/dL      Creatinine 0.47 mg/dL      Sodium 136 mmol/L      Potassium 3.4 mmol/L      Chloride 100 mmol/L      CO2 21.5 mmol/L      Calcium 8.6 mg/dL      eGFR Non African Amer >150 mL/min/1.73      BUN/Creatinine Ratio 10.6     Anion Gap 14.5 mmol/L     Narrative:       GFR Normal >60  Chronic Kidney Disease <60  Kidney Failure <15    CBC & Differential [[id number]] Collected:  02/10/19 1206    Specimen:  Blood Updated:  02/10/19 1249    Narrative:       The following orders were created for panel order CBC & Differential.  Procedure                               Abnormality         Status                     ---------                               -----------         ------                     CBC Auto Differential[[id number]]        Abnormal            Final result                 Please view results for these tests on the individual orders.    CBC Auto Differential [[id number]]   (Abnormal) Collected:  02/10/19 1206    Specimen:  Blood from Arm, Left Updated:  02/10/19 1249     WBC 15.35 10*3/mm3      RBC 2.83 10*6/mm3      Hemoglobin 8.7 g/dL      Hematocrit 26.5 %      MCV 93.6 fL      MCH 30.7 pg      MCHC 32.8 g/dL      RDW 13.8 %      RDW-SD 47.7 fl      MPV 10.1 fL      Platelets 229 10*3/mm3      Neutrophil % 79.6 %      Lymphocyte % 8.2 %      Monocyte % 10.9 %      Eosinophil % 0.5 %      Basophil % 0.2 %      Immature Grans % 0.6 %      Neutrophils, Absolute 12.21 10*3/mm3      Lymphocytes, Absolute 1.26 10*3/mm3      Monocytes, Absolute 1.68 10*3/mm3      Eosinophils, Absolute 0.08 10*3/mm3      Basophils, Absolute 0.03 10*3/mm3      Immature Grans, Absolute 0.09 10*3/mm3     Urine Culture - Urine, Urine, Clean Catch [[id number]]  (Normal) Collected:  19    Specimen:  Urine, Clean Catch Updated:  02/10/19 1135     Urine Culture Culture in progress          NST: 120s/reactive/moderate variability/no decelerations  Tocometry: No contractions    Assessment:  1.  18-year-old  1 at 28-3/7 weeks gestational age with right-sided pyelonephritis, improving  2.  Hypokalemia, resolved  3.  Anemia, asymptomatic.  On oral iron    Plan:  1.  Patient has tolerated cefazolin with no allergic reactions.  She does not appear to have any sort of cephalosporin allergy.  I have updated her allergens list.  Her urine cultures are still pending at this time.  Clinically she appears much improved.  Her leukocytosis has resolved.  She has remained afebrile.  We will continue IV cefazolin until at least 48 hours.  We will that she may be able to go home tomorrow, but would be nice to have urine culture results prior to discharge that we may be able to direct oral therapy.  Patient has previously had urine cultures ×4 that showed Klebsiella pneumoniae a, sensitive to cephalosporins, so this is very likely to be the same bacteria.    I spent 25 minutes on the floor in the care of this  patient (reviewing the chart, consult other physicians, direct patient care), with greater than 50 percent this time in direct counseling/coordination with the patient and her family.    2.  Plan of care discussed with the patient.  All questions answered.

## 2019-02-11 NOTE — PLAN OF CARE
Problem: Patient Care Overview  Goal: Plan of Care Review  Outcome: Ongoing (interventions implemented as appropriate)   02/10/19 1800   Coping/Psychosocial   Plan of Care Reviewed With patient;spouse   Plan of Care Review   Progress no change   OTHER   Outcome Summary VSS, antbx changed today, pt started on regular diet, RNST x2 today. DFM this afternoon so second NST done and FKC then completed in 25 min. VSS and pt afebrile     Goal: Individualization and Mutuality  Outcome: Ongoing (interventions implemented as appropriate)   02/10/19 1800   Individualization   Patient Specific Preferences to be DC home soon, pain controlled   Patient Specific Goals (Include Timeframe) resolve pain, VSS   Patient Specific Interventions IV antibiotics, IVF, labs, NST BID   Mutuality/Individual Preferences   What Anxieties, Fears, Concerns, or Questions Do You Have About Your Care? needles, tape being removed from skin/arm hairs   What Information Would Help Us Give You More Personalized Care? hx freq UTI's   How Would You and/or Your Support Person Like to Participate in Your Care? explanations   Mutuality/Individual Preferences   How to Address Anxieties/Fears keep informed of POC     Goal: Discharge Needs Assessment  Outcome: Ongoing (interventions implemented as appropriate)   02/10/19 1800   Discharge Needs Assessment   Readmission Within the Last 30 Days no previous admission in last 30 days   Concerns to be Addressed no discharge needs identified   Patient/Family Anticipates Transition to home with family   Patient/Family Anticipated Services at Transition none   Transportation Anticipated car, drives self   Anticipated Changes Related to Illness none   Equipment Needed After Discharge none   Offered/Gave Vendor List no   Disability   Equipment Currently Used at Home none     Goal: Interprofessional Rounds/Family Conf  Outcome: Ongoing (interventions implemented as appropriate)   02/10/19 1800   Interdisciplinary  Rounds/Family Conf   Participants family;nursing;patient;pharmacy;physician       Problem: Pain, Acute (Adult)  Goal: Identify Related Risk Factors and Signs and Symptoms  Outcome: Ongoing (interventions implemented as appropriate)   02/10/19 1800   Pain, Acute (Adult)   Related Risk Factors (Acute Pain) infection;fear     Goal: Acceptable Pain Control/Comfort Level  Outcome: Ongoing (interventions implemented as appropriate)   02/10/19 1800   Pain, Acute (Adult)   Acceptable Pain Control/Comfort Level making progress toward outcome       Problem: Prenatal Patient, Hospitalized (Adult,Obstetrics,Pediatric)  Goal: Signs and Symptoms of Listed Potential Problems Will be Absent, Minimized or Managed (Prenatal Patient, Hospitalized)  Outcome: Ongoing (interventions implemented as appropriate)   02/10/19 1800   Goal/Outcome Evaluation   Problems Assessed (Prenatal Patient) all   Problems Present (Prenatal Patient) none

## 2019-02-11 NOTE — PLAN OF CARE
Problem: Patient Care Overview  Goal: Plan of Care Review  Outcome: Ongoing (interventions implemented as appropriate)   02/11/19 0600   Coping/Psychosocial   Plan of Care Reviewed With patient   Plan of Care Review   Progress no change   OTHER   Outcome Summary VSS. Afebrile. +FM. Denies LOF, VB, CTXs. Medications as ordered. Pt rested well overngiht. AM labs drawn.      Goal: Individualization and Mutuality  Outcome: Ongoing (interventions implemented as appropriate)   02/10/19 1800   Individualization   Patient Specific Preferences to be DC home soon, pain controlled   Patient Specific Goals (Include Timeframe) resolve pain, VSS   Patient Specific Interventions IV antibiotics, IVF, labs, NST BID   Mutuality/Individual Preferences   What Anxieties, Fears, Concerns, or Questions Do You Have About Your Care? needles, tape being removed from skin/arm hairs   What Information Would Help Us Give You More Personalized Care? hx freq UTI's   How Would You and/or Your Support Person Like to Participate in Your Care? explanations   Mutuality/Individual Preferences   How to Address Anxieties/Fears keep informed of POC     Goal: Discharge Needs Assessment  Outcome: Ongoing (interventions implemented as appropriate)   02/10/19 0239 02/10/19 1800   Discharge Needs Assessment   Readmission Within the Last 30 Days --  no previous admission in last 30 days   Concerns to be Addressed --  no discharge needs identified   Patient/Family Anticipates Transition to --  home with family   Patient/Family Anticipated Services at Transition --  none   Transportation Concerns car, none --    Transportation Anticipated --  car, drives self   Anticipated Changes Related to Illness --  none   Equipment Needed After Discharge --  none   Offered/Gave Vendor List --  no   Disability   Equipment Currently Used at Home --  none     Goal: Interprofessional Rounds/Family Conf  Outcome: Ongoing (interventions implemented as appropriate)   02/11/19 0600    Interdisciplinary Rounds/Family Conf   Participants family;nursing;patient;pharmacy;physician       Problem: Pain, Acute (Adult)  Goal: Identify Related Risk Factors and Signs and Symptoms  Outcome: Ongoing (interventions implemented as appropriate)   02/11/19 0600   Pain, Acute (Adult)   Related Risk Factors (Acute Pain) infection;fear   Signs and Symptoms (Acute Pain) verbalization of pain descriptors     Goal: Acceptable Pain Control/Comfort Level  Outcome: Ongoing (interventions implemented as appropriate)   02/11/19 0600   Pain, Acute (Adult)   Acceptable Pain Control/Comfort Level making progress toward outcome       Problem: Prenatal Patient, Hospitalized (Adult,Obstetrics,Pediatric)  Goal: Signs and Symptoms of Listed Potential Problems Will be Absent, Minimized or Managed (Prenatal Patient, Hospitalized)  Outcome: Ongoing (interventions implemented as appropriate)   02/11/19 0600   Goal/Outcome Evaluation   Problems Assessed (Prenatal Patient) all   Problems Present (Prenatal Patient) infection

## 2019-02-11 NOTE — NON STRESS TEST
Julian Fleming, a  at 28w3d with an PILLO of 5/3/2019, by Last Menstrual Period, was seen at [facility] for a nonstress test.    Chief Complaint   Patient presents with   • Abdominal Pain     lower R abd pain that began wednesday. pt reports pain is intermittant. denies LOF, vag bleeding, +FM        Patient Active Problem List   Diagnosis   • Normal pregnancy   • Migraine   • UTI in pregnancy   • Chlamydia   • Asymptomatic bacteriuria during pregnancy   • Pyelonephritis affecting pregnancy in third trimester   • Anemia during pregnancy       Start Time: 852  Stop Time: 916    Interpretation A  Nonstress Test Interpretation A: Reactive (19 : Nunu Shine, RN)

## 2019-02-11 NOTE — PLAN OF CARE
Problem: Patient Care Overview  Goal: Plan of Care Review  Outcome: Ongoing (interventions implemented as appropriate)   02/11/19 1724   Coping/Psychosocial   Plan of Care Reviewed With patient   OTHER   Outcome Summary VS WNL. Pain resolved. IV abx. IV s/l.      Goal: Individualization and Mutuality  Outcome: Ongoing (interventions implemented as appropriate)   02/10/19 1800   Individualization   Patient Specific Preferences to be DC home soon, pain controlled   Patient Specific Goals (Include Timeframe) resolve pain, VSS   Patient Specific Interventions IV antibiotics, IVF, labs, NST BID   Mutuality/Individual Preferences   What Anxieties, Fears, Concerns, or Questions Do You Have About Your Care? needles, tape being removed from skin/arm hairs   What Information Would Help Us Give You More Personalized Care? hx freq UTI's   How Would You and/or Your Support Person Like to Participate in Your Care? explanations   Mutuality/Individual Preferences   How to Address Anxieties/Fears keep informed of POC     Goal: Discharge Needs Assessment  Outcome: Ongoing (interventions implemented as appropriate)   02/10/19 0239 02/10/19 1800   Discharge Needs Assessment   Readmission Within the Last 30 Days --  no previous admission in last 30 days   Concerns to be Addressed --  no discharge needs identified   Patient/Family Anticipates Transition to --  home with family   Patient/Family Anticipated Services at Transition --  none   Transportation Concerns car, none --    Transportation Anticipated --  car, drives self   Anticipated Changes Related to Illness --  none   Equipment Needed After Discharge --  none   Offered/Gave Vendor List --  no   Disability   Equipment Currently Used at Home --  none     Goal: Interprofessional Rounds/Family Conf  Outcome: Ongoing (interventions implemented as appropriate)   02/11/19 1724   Interdisciplinary Rounds/Family Conf   Participants nursing;patient;physician       Problem: Pain, Acute  (Adult)  Goal: Identify Related Risk Factors and Signs and Symptoms  Outcome: Ongoing (interventions implemented as appropriate)   02/11/19 1724   Pain, Acute (Adult)   Related Risk Factors (Acute Pain) infection   Signs and Symptoms (Acute Pain) other (see comments)  (none)     Goal: Acceptable Pain Control/Comfort Level  Outcome: Ongoing (interventions implemented as appropriate)   02/11/19 1724   Pain, Acute (Adult)   Acceptable Pain Control/Comfort Level making progress toward outcome       Problem: Prenatal Patient, Hospitalized (Adult,Obstetrics,Pediatric)  Goal: Signs and Symptoms of Listed Potential Problems Will be Absent, Minimized or Managed (Prenatal Patient, Hospitalized)  Outcome: Ongoing (interventions implemented as appropriate)   02/11/19 1724   Goal/Outcome Evaluation   Problems Assessed (Prenatal Patient) all   Problems Present (Prenatal Patient) none

## 2019-02-12 VITALS
DIASTOLIC BLOOD PRESSURE: 59 MMHG | SYSTOLIC BLOOD PRESSURE: 105 MMHG | HEART RATE: 69 BPM | RESPIRATION RATE: 18 BRPM | TEMPERATURE: 97.5 F | BODY MASS INDEX: 21.14 KG/M2 | WEIGHT: 112 LBS | HEIGHT: 61 IN | OXYGEN SATURATION: 97 %

## 2019-02-12 LAB — BACTERIA SPEC AEROBE CULT: ABNORMAL

## 2019-02-12 PROCEDURE — 59025 FETAL NON-STRESS TEST: CPT

## 2019-02-12 PROCEDURE — 25010000003 CEFAZOLIN 1-4 GM/50ML-% SOLUTION: Performed by: OBSTETRICS & GYNECOLOGY

## 2019-02-12 PROCEDURE — 99239 HOSP IP/OBS DSCHRG MGMT >30: CPT | Performed by: OBSTETRICS & GYNECOLOGY

## 2019-02-12 PROCEDURE — 59025 FETAL NON-STRESS TEST: CPT | Performed by: OBSTETRICS & GYNECOLOGY

## 2019-02-12 RX ORDER — CEPHALEXIN 500 MG/1
500 CAPSULE ORAL DAILY
Qty: 30 CAPSULE | Refills: 3 | Status: SHIPPED | OUTPATIENT
Start: 2019-02-20 | End: 2019-03-08 | Stop reason: SDUPTHER

## 2019-02-12 RX ORDER — CEPHALEXIN 500 MG/1
500 CAPSULE ORAL 2 TIMES DAILY
Qty: 14 CAPSULE | Refills: 0 | Status: SHIPPED | OUTPATIENT
Start: 2019-02-12 | End: 2019-02-19

## 2019-02-12 RX ADMIN — SODIUM CHLORIDE, PRESERVATIVE FREE 5 ML: 5 INJECTION INTRAVENOUS at 13:46

## 2019-02-12 RX ADMIN — CEFAZOLIN SODIUM 1 G: 1 INJECTION, SOLUTION INTRAVENOUS at 13:01

## 2019-02-12 RX ADMIN — SODIUM CHLORIDE, PRESERVATIVE FREE 3 ML: 5 INJECTION INTRAVENOUS at 08:34

## 2019-02-12 RX ADMIN — SODIUM CHLORIDE, PRESERVATIVE FREE 5 ML: 5 INJECTION INTRAVENOUS at 13:01

## 2019-02-12 RX ADMIN — Medication 1 TABLET: at 08:33

## 2019-02-12 RX ADMIN — Medication 250 MG: at 08:33

## 2019-02-12 RX ADMIN — CEFAZOLIN SODIUM 1 G: 1 INJECTION, SOLUTION INTRAVENOUS at 05:02

## 2019-02-12 RX ADMIN — FERROUS SULFATE TAB 325 MG (65 MG ELEMENTAL FE) 325 MG: 325 (65 FE) TAB at 08:33

## 2019-02-12 NOTE — PROGRESS NOTES
Chief complaint: Pyelonephritis  History present illness: Patient reports no events overnight.  She is not having any pain today.  No fevers or chills.  No nausea or vomiting.  Normal fetal movement.    O:  Vitals:    19 1134 19 2140 19 0827 19 1224   BP:  119/60 103/63 105/59   BP Location:  Left arm Left arm Left arm   Patient Position:  Sitting Sitting Sitting   Pulse:  91 89 69   Resp:  18 16 18   Temp:  97.9 °F (36.6 °C) 97.4 °F (36.3 °C) 97.5 °F (36.4 °C)   TempSrc:  Oral Axillary Oral   SpO2:   100% 97%   Weight: 50.8 kg (112 lb)      Height:       General: No acute distress, awake and oriented ×3  Lungs: Clear to auscultation bilaterally  Cardiovascular: Regular rate and rhythm  Abdomen: Soft, nontender, nondistended, normoactive bowel sounds, no CVA tenderness  Extremities: No Tenderness, no Homans sign, no lower extremity edema    NST: 130s/reactive/moderate variability/no decelerations  Tocometry: No contractions  Interpretation: Category 1    Lab Results (last 24 hours)     Procedure Component Value Units Date/Time    Urine Culture - Urine, Urine, Clean Catch [[id number]]  (Abnormal)  (Susceptibility) Collected:  19    Specimen:  Urine, Clean Catch Updated:  19     Urine Culture >100,000 CFU/mL Klebsiella pneumoniae ssp pneumoniae    Susceptibility      Klebsiella pneumoniae ssp pneumoniae     MELO     Ampicillin Resistant     Ampicillin + Sulbactam Susceptible     Cefazolin Susceptible     Cefepime Susceptible     Ceftriaxone Susceptible     Ciprofloxacin Susceptible     Ertapenem Susceptible     Gentamicin Susceptible     Levofloxacin Susceptible     Nitrofurantoin Intermediate     Piperacillin + Tazobactam Susceptible     Tetracycline Susceptible     Trimethoprim + Sulfamethoxazole Susceptible                          Assessment:  1.  18-year-old  1 at 28-4/7 weeks gestational age with right-sided pyelonephritis, resolved  2.  Hypokalemia,  resolved    Plan:  1.  Patient's cultures have resulted.  She again is positive for Klebsiella pneumoniae, sensitive to cephalosporins.  We will discharge the patient home today to continue oral cephalexin.  She will need to complete a total of a 10 day course.  We will keep her here until her 1:00 antibiotic dose, and she will be discharged after that.  I stressed the importance of finishing all of her medication.  She verbalized understanding.  She'll follow-up with Dr. Crowell as scheduled.

## 2019-02-12 NOTE — NURSING NOTE
Patient given DC instructions on pyelonephritis, UTI, third trimester pregnancy and all meds given in hospital as well as antibiotic she will take when she is at home.  Pt told to keep scheduled follow up appt in office with Dr Crowell.  Pt told to call MD or come back to the hospital if has fevers, back or abd pain, pain with urination, cramping or ctx, decreased FM, leaking of fluid or vaginal bleeding.  Patient then left ambulatory with significant other at 1619.

## 2019-02-12 NOTE — DISCHARGE SUMMARY
Date of Discharge:  2019    Discharge Diagnosis:   1.  18-year-old  1 at 28-4/7 weeks gestational age with right-sided pyelonephritis, resolved  2.  Hypokalemia, resolved    Presenting Problem/History of Present Illness  Pregnancy [Z34.90]  Pregnancy [Z34.90]     Abdominal pain and pregnancy, suspect nephritis  Hospital Course  Patient is a 18 y.o. female presented with right lower quadrant abdominal pain and right flank and back pain.  Her initial urinalysis was consistent with likely infection.  Patient is a long-standing history of multiple urinary tract infections this pregnancy, all with Klebsiella pneumoniae area she was initially started on ampicillin, but previous cultures that show resistance to ampicillin, so ampicillin was discontinued and the patient was started on cefazolin.  Her white blood cell count normalized.  She had improvement in her symptoms.  By hospital day #3 she had received greater than 48 hours of IV antibiotics and her symptoms have resolved.  She was stable for discharge home.  She will need to continue oral antibiotics in the outpatient setting.  She will complete a total 10 day course of Keflex, and then would start daily suppression with Keflex.  Extensive discharge instructions are given to the patient verbalized understanding.      Procedures Performed         Consults:   Consults     No orders found from 2019 to 2/10/2019.          Pertinent Test Results:   Lab Results (last 48 hours)     Procedure Component Value Units Date/Time    Urine Culture - Urine, Urine, Clean Catch [[id number]]  (Abnormal)  (Susceptibility) Collected:  19 2159    Specimen:  Urine, Clean Catch Updated:  19 0614     Urine Culture >100,000 CFU/mL Klebsiella pneumoniae ssp pneumoniae    Susceptibility      Klebsiella pneumoniae ssp pneumoniae     MELO     Ampicillin Resistant     Ampicillin + Sulbactam Susceptible     Cefazolin Susceptible     Cefepime Susceptible     Ceftriaxone  Susceptible     Ciprofloxacin Susceptible     Ertapenem Susceptible     Gentamicin Susceptible     Levofloxacin Susceptible     Nitrofurantoin Intermediate     Piperacillin + Tazobactam Susceptible     Tetracycline Susceptible     Trimethoprim + Sulfamethoxazole Susceptible                    Basic Metabolic Panel [[id number]]  (Abnormal) Collected:  02/11/19 0541    Specimen:  Blood Updated:  02/11/19 0622     Glucose 77 mg/dL      BUN 4 mg/dL      Creatinine 0.44 mg/dL      Sodium 137 mmol/L      Potassium 4.1 mmol/L      Chloride 104 mmol/L      CO2 23.1 mmol/L      Calcium 8.3 mg/dL      eGFR Non African Amer >150 mL/min/1.73      BUN/Creatinine Ratio 9.1     Anion Gap 9.9 mmol/L     Narrative:       GFR Normal >60  Chronic Kidney Disease <60  Kidney Failure <15    CBC & Differential [[id number]] Collected:  02/11/19 0541    Specimen:  Blood Updated:  02/11/19 0559    Narrative:       The following orders were created for panel order CBC & Differential.  Procedure                               Abnormality         Status                     ---------                               -----------         ------                     CBC Auto Differential[[id number]]        Abnormal            Final result                 Please view results for these tests on the individual orders.    CBC Auto Differential [[id number]]  (Abnormal) Collected:  02/11/19 0541    Specimen:  Blood Updated:  02/11/19 0559     WBC 10.08 10*3/mm3      RBC 2.65 10*6/mm3      Hemoglobin 8.1 g/dL      Hematocrit 25.1 %      MCV 94.7 fL      MCH 30.6 pg      MCHC 32.3 g/dL      RDW 13.9 %      RDW-SD 48.6 fl      MPV 9.6 fL      Platelets 234 10*3/mm3      Neutrophil % 68.7 %      Lymphocyte % 18.4 %      Monocyte % 10.9 %      Eosinophil % 1.3 %      Basophil % 0.3 %      Immature Grans % 0.4 %      Neutrophils, Absolute 6.93 10*3/mm3      Lymphocytes, Absolute 1.85 10*3/mm3      Monocytes, Absolute 1.10 10*3/mm3      Eosinophils, Absolute 0.13  10*3/mm3      Basophils, Absolute 0.03 10*3/mm3      Immature Grans, Absolute 0.04 10*3/mm3           Condition on Discharge:  good    Vital Signs  Temp:  [97.4 °F (36.3 °C)-97.9 °F (36.6 °C)] 97.4 °F (36.3 °C)  Heart Rate:  [89-91] 89  Resp:  [16-18] 16  BP: (103-119)/(60-63) 103/63    Physical Exam:   See progress notes    Discharge Disposition  Home or Self Care    Discharge Medications     Discharge Medications      New Medications      Instructions Start Date   cephalexin 500 MG capsule  Commonly known as:  KEFLEX   500 mg, Oral, 2 Times Daily      cephalexin 500 MG capsule  Commonly known as:  KEFLEX   500 mg, Oral, Daily   Start Date:  2/20/2019        Continue These Medications      Instructions Start Date   docusate sodium 100 MG capsule  Commonly known as:  COLACE   100 mg, Oral, 2 Times Daily      ferrous sulfate 325 (65 FE) MG tablet   325 mg, Oral, Daily With Breakfast      ondansetron 4 MG tablet  Commonly known as:  ZOFRAN   4 mg, Oral, Daily PRN      WAL-KIERA 25 MG tablet  Generic drug:  doxylamine   TK 1/2 T PO QHS AS NEEDED FOR N         ASK your doctor about these medications      Instructions Start Date   Prenatal Vitamin 27-0.8 MG tablet   1 tablet, Oral, Daily      vitamin B-6 25 MG tablet  Commonly known as:  PYRIDOXINE   25 mg, Oral, 3 Times Daily             Discharge Diet:   Diet Instructions     Diet: Regular      Discharge Diet:  Regular          Activity at Discharge:   Activity Instructions     Activity as Tolerated            Follow-up Appointments  Future Appointments   Date Time Provider Department Center   2/22/2019  1:00 PM Pat Crowell MD [facility] None     Additional Instructions for the Follow-ups that You Need to Schedule     Call MD With Problems / Concerns   As directed      Instructions: Call for fever, severe pain, contractions, inability to take medication or any other major conmcerns    Order Comments:  Instructions: Call for fever, severe pain, contractions,  inability to take medication or any other [facility]          Discharge Follow-up with Specified Provider: Dr. Crowell   As directed      To:  Dr. Crowell    Follow Up Details:  as scheduled               Test Results Pending at Discharge   Order Current Status    Buprenorphine Screen Urine - Urine, Clean Catch In process    URINE DRUG SCREEN PLUS BUPRENORPHINE - In process           Rickey Batista MD  02/12/19  10:50 AM    Time: Greater than 30 minutes was spent in this discharge

## 2019-02-12 NOTE — NON STRESS TEST
Julian Fleming, a  at 28w3d with an PILLO of 5/3/2019, by Last Menstrual Period, was seen at [facility] for a nonstress test.    Chief Complaint   Patient presents with   • Abdominal Pain     lower R abd pain that began wednesday. pt reports pain is intermittant. denies LOF, vag bleeding, +FM        Patient Active Problem List   Diagnosis   • Normal pregnancy   • Migraine   • UTI in pregnancy   • Chlamydia   • Asymptomatic bacteriuria during pregnancy   • Pyelonephritis affecting pregnancy in third trimester   • Anemia during pregnancy       Start Time:   Stop Time:        Reactive

## 2019-02-12 NOTE — PLAN OF CARE
Problem: Patient Care Overview  Goal: Plan of Care Review  Outcome: Ongoing (interventions implemented as appropriate)   02/12/19 0515   Coping/Psychosocial   Plan of Care Reviewed With patient;significant other   Plan of Care Review   Progress no change   OTHER   Outcome Summary VS WNL. Pt denies pain and has been sleping throughout the night. Pt still receiving IV antibiotics. Will continue to monitor.      Goal: Individualization and Mutuality  Outcome: Ongoing (interventions implemented as appropriate)   02/10/19 1800   Individualization   Patient Specific Preferences to be DC home soon, pain controlled   Patient Specific Goals (Include Timeframe) resolve pain, VSS   Patient Specific Interventions IV antibiotics, IVF, labs, NST BID   Mutuality/Individual Preferences   What Anxieties, Fears, Concerns, or Questions Do You Have About Your Care? needles, tape being removed from skin/arm hairs   What Information Would Help Us Give You More Personalized Care? hx freq UTI's   How Would You and/or Your Support Person Like to Participate in Your Care? explanations   Mutuality/Individual Preferences   How to Address Anxieties/Fears keep informed of POC       Problem: Pain, Acute (Adult)  Goal: Identify Related Risk Factors and Signs and Symptoms  Outcome: Ongoing (interventions implemented as appropriate)   02/12/19 0515   Pain, Acute (Adult)   Related Risk Factors (Acute Pain) infection   Signs and Symptoms (Acute Pain) other (see comments)  (Pt denies pain. )     Goal: Acceptable Pain Control/Comfort Level  Outcome: Ongoing (interventions implemented as appropriate)   02/12/19 0515   Pain, Acute (Adult)   Acceptable Pain Control/Comfort Level achieves outcome       Problem: Prenatal Patient, Hospitalized (Adult,Obstetrics,Pediatric)  Goal: Signs and Symptoms of Listed Potential Problems Will be Absent, Minimized or Managed (Prenatal Patient, Hospitalized)  Outcome: Ongoing (interventions implemented as appropriate)    02/12/19 0515   Goal/Outcome Evaluation   Problems Assessed (Prenatal Patient) all   Problems Present (Prenatal Patient) none

## 2019-02-12 NOTE — PLAN OF CARE
Problem: Patient Care Overview  Goal: Plan of Care Review  Outcome: Outcome(s) achieved Date Met: 02/12/19 02/12/19 1600   Coping/Psychosocial   Plan of Care Reviewed With patient;significant other   Plan of Care Review   Progress improving   OTHER   Outcome Summary VSS and pt has remained afebrile, Pt denies pain, nausea or vomiting. Pt denies pain with urination. Pt DC home today and to continue on oral antibiotics.     Goal: Individualization and Mutuality  Outcome: Outcome(s) achieved Date Met: 02/12/19 02/12/19 1400   Individualization   Patient Specific Preferences Pt wanting to be DC home today   Patient Specific Goals (Include Timeframe) pain controlled, VSS and pt afebrile   Patient Specific Interventions IV antibiotics, NST BID, VS    Mutuality/Individual Preferences   What Anxieties, Fears, Concerns, or Questions Do You Have About Your Care? needles, tape removed from skin and arm hairs   What Information Would Help Us Give You More Personalized Care? hx freq UTI's   How Would You and/or Your Support Person Like to Participate in Your Care? explanations   Mutuality/Individual Preferences   How to Address Anxieties/Fears keep informed of POC     Goal: Discharge Needs Assessment  Outcome: Outcome(s) achieved Date Met: 02/12/19 02/12/19 1400   Discharge Needs Assessment   Readmission Within the Last 30 Days no previous admission in last 30 days   Concerns to be Addressed no discharge needs identified   Patient/Family Anticipates Transition to home with family   Transportation Anticipated car, drives self   Anticipated Changes Related to Illness none   Equipment Needed After Discharge none   Offered/Gave Vendor List no   Disability   Equipment Currently Used at Home none     Goal: Interprofessional Rounds/Family Conf  Outcome: Outcome(s) achieved Date Met: 02/12/19 02/12/19 1400   Interdisciplinary Rounds/Family Conf   Participants family;nursing;patient;pharmacy;physician       Problem: Pain, Acute  (Adult)  Goal: Identify Related Risk Factors and Signs and Symptoms  Outcome: Outcome(s) achieved Date Met: 02/12/19 02/12/19 1628   Pain, Acute (Adult)   Related Risk Factors (Acute Pain) infection      02/12/19 1628   Pain, Acute (Adult)   Related Risk Factors (Acute Pain) infection     Goal: Acceptable Pain Control/Comfort Level  Outcome: Outcome(s) achieved Date Met: 02/12/19 02/12/19 1400   Pain, Acute (Adult)   Acceptable Pain Control/Comfort Level achieves outcome       Problem: Prenatal Patient, Hospitalized (Adult,Obstetrics,Pediatric)  Goal: Signs and Symptoms of Listed Potential Problems Will be Absent, Minimized or Managed (Prenatal Patient, Hospitalized)  Outcome: Outcome(s) achieved Date Met: 02/12/19 02/12/19 1400   Goal/Outcome Evaluation   Problems Assessed (Prenatal Patient) all   Problems Present (Prenatal Patient) none

## 2019-02-13 LAB — BUPRENORPHINE UR QL: NEGATIVE NG/ML

## 2019-02-22 ENCOUNTER — ROUTINE PRENATAL (OUTPATIENT)
Dept: OBSTETRICS AND GYNECOLOGY | Facility: CLINIC | Age: 19
End: 2019-02-22

## 2019-02-22 VITALS — WEIGHT: 114 LBS | BODY MASS INDEX: 21.54 KG/M2 | SYSTOLIC BLOOD PRESSURE: 126 MMHG | DIASTOLIC BLOOD PRESSURE: 78 MMHG

## 2019-02-22 DIAGNOSIS — Z34.03 ENCOUNTER FOR SUPERVISION OF NORMAL FIRST PREGNANCY IN THIRD TRIMESTER: Primary | ICD-10-CM

## 2019-02-22 PROCEDURE — 90715 TDAP VACCINE 7 YRS/> IM: CPT | Performed by: OBSTETRICS & GYNECOLOGY

## 2019-02-22 PROCEDURE — 90471 IMMUNIZATION ADMIN: CPT | Performed by: OBSTETRICS & GYNECOLOGY

## 2019-02-22 PROCEDURE — 99213 OFFICE O/P EST LOW 20 MIN: CPT | Performed by: OBSTETRICS & GYNECOLOGY

## 2019-02-22 NOTE — PROGRESS NOTES
Chief Complaint   Patient presents with   • Routine Prenatal Visit      Julian Fleming is a 18 y.o.  at 30w0d   Reports no issues.    Has been compliant with Keflex  Denies discharge, fever, chills, back pain  Notes normal fetal movements, no contractions    /78   Wt 51.7 kg (114 lb)   LMP 2018 (Exact Date)   BMI 21.54 kg/m²    Abd: gravid, nontender  See OB Flowsheet    ASSESSMENT:   1. IUP at 30w0d   2. H/o chlamydia, MICHELE today  3. H/o pyelonephritis, on suppression - repeat next visit  4. Anemia, on iron  5. S<D, ultrasound for growth  PLAN:  US next visit for growth  Continue abx suppression, repeat urine culture next visit  Tdap today, counseled on cocooning  Repeat GC/CT swab  Contraception oral contraceptive pill, counseled on pelvic rest x 6 weeks and start >4 weeks after delivery  Return in about 2 weeks (around 3/8/2019) for dating US for S<D, OB visit.  Orders Placed This Encounter   Procedures   • Chlamydia trachomatis, Neisseria gonorrhoeae, Trichomonas vaginalis, PCR - Swab, Vagina

## 2019-02-27 ENCOUNTER — TELEPHONE (OUTPATIENT)
Dept: OBSTETRICS AND GYNECOLOGY | Facility: CLINIC | Age: 19
End: 2019-02-27

## 2019-02-27 RX ORDER — AZITHROMYCIN 500 MG/1
1000 TABLET, FILM COATED ORAL ONCE
Qty: 2 TABLET | Refills: 0 | Status: SHIPPED | OUTPATIENT
Start: 2019-02-27 | End: 2019-02-27

## 2019-03-08 ENCOUNTER — PROCEDURE VISIT (OUTPATIENT)
Dept: OBSTETRICS AND GYNECOLOGY | Facility: CLINIC | Age: 19
End: 2019-03-08

## 2019-03-08 ENCOUNTER — ROUTINE PRENATAL (OUTPATIENT)
Dept: OBSTETRICS AND GYNECOLOGY | Facility: CLINIC | Age: 19
End: 2019-03-08

## 2019-03-08 VITALS — WEIGHT: 117.6 LBS | BODY MASS INDEX: 22.22 KG/M2 | SYSTOLIC BLOOD PRESSURE: 108 MMHG | DIASTOLIC BLOOD PRESSURE: 60 MMHG

## 2019-03-08 DIAGNOSIS — O99.891 ASYMPTOMATIC BACTERIURIA DURING PREGNANCY: Primary | ICD-10-CM

## 2019-03-08 DIAGNOSIS — O26.843 SIZE OF FETUS INCONSISTENT WITH DATES IN THIRD TRIMESTER: Primary | ICD-10-CM

## 2019-03-08 DIAGNOSIS — R82.71 ASYMPTOMATIC BACTERIURIA DURING PREGNANCY: Primary | ICD-10-CM

## 2019-03-08 PROCEDURE — 99213 OFFICE O/P EST LOW 20 MIN: CPT | Performed by: OBSTETRICS & GYNECOLOGY

## 2019-03-08 PROCEDURE — 76816 OB US FOLLOW-UP PER FETUS: CPT | Performed by: OBSTETRICS & GYNECOLOGY

## 2019-03-08 RX ORDER — CEPHALEXIN 500 MG/1
500 CAPSULE ORAL DAILY
Qty: 30 CAPSULE | Refills: 3 | Status: SHIPPED | OUTPATIENT
Start: 2019-03-08 | End: 2019-03-29 | Stop reason: SDUPTHER

## 2019-03-08 NOTE — PROGRESS NOTES
Chief Complaint   Patient presents with   • Routine Prenatal Visit     juanito Fleming is a 18 y.o.  at 32w0d   Reports no issues.    Has been compliant with Keflex, took azithromycin this week  Did not leave clean catch and cannot leave extra sample  Has not had intercourse since treating for chlamydia  Denies discharge, fever, chills, back pain  Notes normal fetal movements, no contractions    /60   Wt 53.3 kg (117 lb 9.6 oz)   LMP 2018 (Exact Date)   BMI 22.22 kg/m²    Abd: gravid, nontender  See OB Flowsheet    ASSESSMENT:   1. IUP at 32w0d   2. H/o chlamydia, most recent test positive reports compliance with treatment and abstinence since  3. H/o pyelonephritis, on suppression -will need clean-catch at next visit  4. Anemia, on iron  5. S<D, ultrasound today appropriate  PLAN:  Continue abx suppression, repeat urine culture next visit  Reviewed growth ultrasound which is appropriate for gestational age we reviewed that her EDC is based on her LMP consistent with an early ultrasound on May 3.  This is consistent with today's growth  Repeat GC/CT swab at 35-36 weeks  Reviewed third trimester precautions, indications that may arise in assessing  delivery, expectant management of pregnancy  Contraception oral contraceptive pill, counseled on pelvic rest x 6 weeks and start >4 weeks after delivery  Return in about 2 weeks (around 3/22/2019).  Orders Placed This Encounter   Procedures   • Urine Culture - Urine, Urine, Clean Catch

## 2019-03-29 ENCOUNTER — ROUTINE PRENATAL (OUTPATIENT)
Dept: OBSTETRICS AND GYNECOLOGY | Facility: CLINIC | Age: 19
End: 2019-03-29

## 2019-03-29 VITALS — DIASTOLIC BLOOD PRESSURE: 72 MMHG | SYSTOLIC BLOOD PRESSURE: 113 MMHG | WEIGHT: 123 LBS | BODY MASS INDEX: 23.24 KG/M2

## 2019-03-29 DIAGNOSIS — Z34.03 ENCOUNTER FOR SUPERVISION OF NORMAL FIRST PREGNANCY IN THIRD TRIMESTER: Primary | ICD-10-CM

## 2019-03-29 PROCEDURE — 99213 OFFICE O/P EST LOW 20 MIN: CPT | Performed by: OBSTETRICS & GYNECOLOGY

## 2019-03-29 RX ORDER — CEPHALEXIN 500 MG/1
500 CAPSULE ORAL DAILY
Qty: 30 CAPSULE | Refills: 3 | Status: SHIPPED | OUTPATIENT
Start: 2019-03-29 | End: 2019-04-18

## 2019-03-29 NOTE — PROGRESS NOTES
Chief Complaint   Patient presents with   • Routine Prenatal Visit      Julian Fleming is a 18 y.o.  at 32w0d   Reports no issues.    Has been compliant with Keflex  Denies discharge, fever, chills, back pain  Notes normal fetal movements, no contractions, no vaginal bleeding    /72   Wt 55.8 kg (123 lb)   LMP 2018 (Exact Date)   BMI 23.24 kg/m²    Abd: gravid, nontender  See OB Flowsheet    ASSESSMENT:   1. IUP at 32w0d   2. H/o chlamydia, most recent test positive reports compliance with treatment and abstinence since  3. H/o pyelonephritis, on suppression - refill sent. Clean catch tocay  4. Anemia, on iron  5. S<D, ultrasound today appropriate  PLAN:  Continue abx suppression, repeat urine culture today  Repeat GC/CT swab, GBS  Reviewed third trimester precautions, indications that may arise in assessing  delivery, expectant management of pregnancy  Contraception oral contraceptive pill, counseled on pelvic rest x 6 weeks and start >4 weeks after delivery  Return in about 1 week (around 2019).  Orders Placed This Encounter   Procedures   • Chlamydia trachomatis, Neisseria gonorrhoeae, Trichomonas vaginalis, PCR - Swab, Vagina   • Group B Streptococcus Culture - Swab, Vaginal/Rectum   • Urine Culture - Urine, Urine, Clean Catch

## 2019-04-01 LAB
BACTERIA UR CULT: NORMAL
BACTERIA UR CULT: NORMAL

## 2019-04-02 ENCOUNTER — TELEPHONE (OUTPATIENT)
Dept: OBSTETRICS AND GYNECOLOGY | Facility: CLINIC | Age: 19
End: 2019-04-02

## 2019-04-02 LAB
B-HEM STREP SPEC QL CULT: NEGATIVE
C TRACH RRNA SPEC QL NAA+PROBE: POSITIVE
N GONORRHOEA RRNA SPEC QL NAA+PROBE: NEGATIVE
T VAGINALIS RRNA VAG QL NAA+PROBE: NEGATIVE

## 2019-04-02 RX ORDER — AZITHROMYCIN 500 MG/1
1000 TABLET, FILM COATED ORAL ONCE
Qty: 2 TABLET | Refills: 0 | Status: SHIPPED | OUTPATIENT
Start: 2019-04-02 | End: 2019-04-02

## 2019-04-02 NOTE — TELEPHONE ENCOUNTER
I attempted to call the patient with regards to her positive chlamydia screen.  No answer, left general voicemail for call back.  This is her third positive chlamydia this pregnancy.  I am concerned for repeat infection versus resistance.  It is highly important that she adhered to the following instructions.  If she feels that it is not possible for her to get every infection and she was treated appropriately for her last infection, we may need to do further testing.    Please call the patient and inform her that her testing came back positive for Chlamydia. She will be prescribed 1g Azithromycin PO x 1 dose.  Please  patient that sexual partner(s) should be tested and treated and refrain from intercourse for at least 7 days after both she and her partner(s) have been treated.  Positive tests are important to treat, because these infections can lead to pelvic inflammatory disease (PID) and/or tubal scarring.  It is important to let us know if she is unable to take the medication or have an episode of vomiting after taking the medication.  Also, let us know if she has abdominal pain, fever, or other concerning signs or symptoms.

## 2019-04-08 ENCOUNTER — TELEPHONE (OUTPATIENT)
Dept: OBSTETRICS AND GYNECOLOGY | Facility: CLINIC | Age: 19
End: 2019-04-08

## 2019-04-08 ENCOUNTER — ROUTINE PRENATAL (OUTPATIENT)
Dept: OBSTETRICS AND GYNECOLOGY | Facility: CLINIC | Age: 19
End: 2019-04-08

## 2019-04-08 VITALS — BODY MASS INDEX: 22.86 KG/M2 | DIASTOLIC BLOOD PRESSURE: 68 MMHG | SYSTOLIC BLOOD PRESSURE: 110 MMHG | WEIGHT: 121 LBS

## 2019-04-08 DIAGNOSIS — O99.019 ANEMIA DURING PREGNANCY: ICD-10-CM

## 2019-04-08 DIAGNOSIS — O98.813 CHLAMYDIA INFECTION AFFECTING PREGNANCY IN THIRD TRIMESTER: ICD-10-CM

## 2019-04-08 DIAGNOSIS — Z3A.36 36 WEEKS GESTATION OF PREGNANCY: ICD-10-CM

## 2019-04-08 DIAGNOSIS — Z34.03 ENCOUNTER FOR SUPERVISION OF NORMAL FIRST PREGNANCY IN THIRD TRIMESTER: Primary | ICD-10-CM

## 2019-04-08 DIAGNOSIS — A74.9 CHLAMYDIA INFECTION AFFECTING PREGNANCY IN THIRD TRIMESTER: ICD-10-CM

## 2019-04-08 PROCEDURE — 99213 OFFICE O/P EST LOW 20 MIN: CPT | Performed by: OBSTETRICS & GYNECOLOGY

## 2019-04-08 NOTE — TELEPHONE ENCOUNTER
----- Message from Pat Crowell MD sent at 4/4/2019  8:19 AM EDT -----  Please let patient know her GBS is negative.  Thanks!    Called patient to inform results, no answer. Left a message to return call.

## 2019-04-08 NOTE — PROGRESS NOTES
Cc:  Pregnancy follow up.  Patient aware that repeat chlamydia was still positive, she took her medication last week, she states partner never was treated from diagnosis in December.  About 8 days ago, they went to health dept where patient's partner received treatment; she was able to verify that he was treated.  Patient took another round of medication at that time.  She denies fevers or chills.  Excellent FM.  Vitals reviewed by me.  Gen - alert and pleasant.  Abdomen - gravid, nontender, no guarding or rebound.  GBS negative.  A/P:  IUP at 36 weeks with Chlamydia, anemia  - Chlamydia.  Patient to do MICHELE in 1 to 2 weeks  - Anemia.  Will consider patient for IV iron and try to set up this week with patient's consent.  - Maternal well being discussed.      I spent 15 minutes with patient and greater than 10 minutes in counseling face to face on above issues.

## 2019-04-18 ENCOUNTER — ROUTINE PRENATAL (OUTPATIENT)
Dept: OBSTETRICS AND GYNECOLOGY | Facility: CLINIC | Age: 19
End: 2019-04-18

## 2019-04-18 VITALS — DIASTOLIC BLOOD PRESSURE: 75 MMHG | WEIGHT: 123 LBS | SYSTOLIC BLOOD PRESSURE: 112 MMHG | BODY MASS INDEX: 23.24 KG/M2

## 2019-04-18 DIAGNOSIS — Z34.03 ENCOUNTER FOR SUPERVISION OF NORMAL FIRST PREGNANCY IN THIRD TRIMESTER: Primary | ICD-10-CM

## 2019-04-18 PROCEDURE — 99214 OFFICE O/P EST MOD 30 MIN: CPT | Performed by: OBSTETRICS & GYNECOLOGY

## 2019-04-18 RX ORDER — CEPHALEXIN 500 MG/1
500 CAPSULE ORAL NIGHTLY
Qty: 30 CAPSULE | Refills: 1 | Status: SHIPPED | OUTPATIENT
Start: 2019-04-18 | End: 2019-04-18 | Stop reason: SDUPTHER

## 2019-04-18 RX ORDER — CEPHALEXIN 500 MG/1
500 CAPSULE ORAL NIGHTLY
Qty: 30 CAPSULE | Refills: 1 | Status: SHIPPED | OUTPATIENT
Start: 2019-04-18 | End: 2019-05-04 | Stop reason: HOSPADM

## 2019-04-18 NOTE — PROGRESS NOTES
Chief Complaint   Patient presents with   • Routine Prenatal Visit      Julian Fleming is a 19 y.o.  at 37w6d   Reports that she and her partner got treated 3 to 4 weeks ago.  No vaginal bleeding, leakage of fluid, contractions only occasional  Notes normal fetal movements.  Has been compliant with iron therapy    /75   Wt 55.8 kg (123 lb)   LMP 2018 (Exact Date)   BMI 23.24 kg/m²    Abd: gravid, nontender  Pelvic: normal external genitalia  See OB Flowsheet    ASSESSMENT:   1. IUP at 37w6d   2. Chlamydia.  Patient to do MICHELE in 1 to 2 weeks  3. Anemia.  Will consider patient for IV iron  PLAN:  Will repeat GC/cT today  Has been compliant with oral iron therapy.  We will repeat CBC today.  If patient still has low hemoglobin, consider IV iron therapy.  Patient is aware of the risk of blood transfusion at the time of delivery  Reviewed common symptoms of the third trimester.  Counseled on labor precautions and anticipated fetal movements.  Reviewed kick counts.  Patient is aware of the location of L&D.    Continue keflex suppression  Return in about 1 week (around 2019).  Orders Placed This Encounter   Procedures   • Chlamydia trachomatis, Neisseria gonorrhoeae, Trichomonas vaginalis, PCR - Swab, Vagina   • CBC (No Diff)

## 2019-04-19 LAB
ERYTHROCYTE [DISTWIDTH] IN BLOOD BY AUTOMATED COUNT: 13.9 % (ref 12.3–15.4)
HCT VFR BLD AUTO: 35.9 % (ref 34–46.6)
HGB BLD-MCNC: 10.8 G/DL (ref 12–15.9)
MCH RBC QN AUTO: 28.9 PG (ref 26.6–33)
MCHC RBC AUTO-ENTMCNC: 30.1 G/DL (ref 31.5–35.7)
MCV RBC AUTO: 96 FL (ref 79–97)
PLATELET # BLD AUTO: 272 10*3/MM3 (ref 140–450)
RBC # BLD AUTO: 3.74 10*6/MM3 (ref 3.77–5.28)
WBC # BLD AUTO: 9.17 10*3/MM3 (ref 3.4–10.8)

## 2019-04-22 ENCOUNTER — TELEPHONE (OUTPATIENT)
Dept: OBSTETRICS AND GYNECOLOGY | Facility: CLINIC | Age: 19
End: 2019-04-22

## 2019-04-22 LAB
C TRACH RRNA SPEC QL NAA+PROBE: NEGATIVE
N GONORRHOEA RRNA SPEC QL NAA+PROBE: NEGATIVE
T VAGINALIS RRNA VAG QL NAA+PROBE: NEGATIVE

## 2019-04-22 NOTE — TELEPHONE ENCOUNTER
----- Message from Pat Crowell MD sent at 4/19/2019  8:22 AM EDT -----  Please let patient know her hemoglobin has improved to 10.8 which is great news!  Continue Iron. Thanks!    04/22/19  Called patient to inform results, no answer. Left a message to return call.    04/24/19  Called patient to inform results, no answer. Left a message to return call.    Pt has an up coming appt on 4/25/19 will informed of results.

## 2019-04-24 ENCOUNTER — TELEPHONE (OUTPATIENT)
Dept: OBSTETRICS AND GYNECOLOGY | Facility: CLINIC | Age: 19
End: 2019-04-24

## 2019-04-24 NOTE — TELEPHONE ENCOUNTER
----- Message from Pat Crowell MD sent at 4/22/2019 11:17 PM EDT -----  Please inform patient of negative gonorrhea/chlamydia/trichomonas testing    -Called patient to inform results, no answer. Left a message to return call.    -Pt has an upcoming appt on 4/25/19. Will inform of results.

## 2019-04-25 ENCOUNTER — ROUTINE PRENATAL (OUTPATIENT)
Dept: OBSTETRICS AND GYNECOLOGY | Facility: CLINIC | Age: 19
End: 2019-04-25

## 2019-04-25 VITALS — SYSTOLIC BLOOD PRESSURE: 128 MMHG | WEIGHT: 125 LBS | DIASTOLIC BLOOD PRESSURE: 85 MMHG | BODY MASS INDEX: 23.62 KG/M2

## 2019-04-25 DIAGNOSIS — Z34.03 ENCOUNTER FOR SUPERVISION OF NORMAL FIRST PREGNANCY IN THIRD TRIMESTER: Primary | ICD-10-CM

## 2019-04-25 PROCEDURE — 99213 OFFICE O/P EST LOW 20 MIN: CPT | Performed by: OBSTETRICS & GYNECOLOGY

## 2019-04-25 RX ORDER — METHYLERGONOVINE MALEATE 0.2 MG/ML
200 INJECTION INTRAVENOUS ONCE AS NEEDED
Status: CANCELLED | OUTPATIENT
Start: 2019-04-25

## 2019-04-25 RX ORDER — SODIUM CHLORIDE 0.9 % (FLUSH) 0.9 %
1-10 SYRINGE (ML) INJECTION AS NEEDED
Status: CANCELLED | OUTPATIENT
Start: 2019-04-25

## 2019-04-25 RX ORDER — OXYTOCIN 10 [USP'U]/ML
999 INJECTION, SOLUTION INTRAMUSCULAR; INTRAVENOUS ONCE
Status: CANCELLED | OUTPATIENT
Start: 2019-04-25

## 2019-04-25 RX ORDER — MISOPROSTOL 100 UG/1
25 TABLET ORAL ONCE
Status: CANCELLED | OUTPATIENT
Start: 2019-04-25 | End: 2019-04-25

## 2019-04-25 RX ORDER — MISOPROSTOL 100 UG/1
800 TABLET ORAL AS NEEDED
Status: CANCELLED | OUTPATIENT
Start: 2019-04-25

## 2019-04-25 RX ORDER — LIDOCAINE HYDROCHLORIDE 10 MG/ML
5 INJECTION, SOLUTION EPIDURAL; INFILTRATION; INTRACAUDAL; PERINEURAL AS NEEDED
Status: CANCELLED | OUTPATIENT
Start: 2019-04-25

## 2019-04-25 RX ORDER — CARBOPROST TROMETHAMINE 250 UG/ML
250 INJECTION, SOLUTION INTRAMUSCULAR AS NEEDED
Status: CANCELLED | OUTPATIENT
Start: 2019-04-25

## 2019-04-25 RX ORDER — OXYTOCIN 10 [USP'U]/ML
125 INJECTION, SOLUTION INTRAMUSCULAR; INTRAVENOUS CONTINUOUS PRN
Status: CANCELLED | OUTPATIENT
Start: 2019-04-25

## 2019-04-25 RX ORDER — SODIUM CHLORIDE 0.9 % (FLUSH) 0.9 %
3 SYRINGE (ML) INJECTION EVERY 12 HOURS SCHEDULED
Status: CANCELLED | OUTPATIENT
Start: 2019-04-25

## 2019-04-25 RX ORDER — OXYTOCIN 10 [USP'U]/ML
0.5 INJECTION, SOLUTION INTRAMUSCULAR; INTRAVENOUS CONTINUOUS
Status: CANCELLED | OUTPATIENT
Start: 2019-04-25 | End: 2019-04-25

## 2019-04-25 NOTE — PROGRESS NOTES
Chief Complaint   Patient presents with   • Routine Prenatal Visit      Julian Fleming is a 19 y.o.  at 38w6d   Reports that she is anxious and stressed because she had a stressful drive here. Denies vision changes/RUQ pain/headache.  Notes normal fetal movements    /90   Wt 56.7 kg (125 lb)   LMP 2018 (Exact Date)   BMI 23.62 kg/m²    Abd: gravid, nontender  See OB Flowsheet    ASSESSMENT:   1. IUP at 38w6d   2. Chlamydia, with most recent test negative  3. Anemia, last HgB 10.8  PLAN:  Reviewed elevated blood pressure today - repeat within normal limits Reviewed signs/symptoms of preeclampsia  Discussed elective induction of labor at 39+ weeks versus expectant management.  Patient is undecided.  She would like to discuss this with her significant other.  She will call back if she desires induction next Thursday.  Will likely need to come in Wednesday night.  Reviewed common symptoms of the third trimester.  Counseled on labor precautions and anticipated fetal movements.  Reviewed kick counts.  Patient is aware of the location of L&D.     1 week if no IOL  No orders of the defined types were placed in this encounter.

## 2019-05-02 ENCOUNTER — HOSPITAL ENCOUNTER (INPATIENT)
Facility: HOSPITAL | Age: 19
LOS: 2 days | Discharge: HOME | End: 2019-05-04
Attending: OBSTETRICS & GYNECOLOGY | Admitting: OBSTETRICS & GYNECOLOGY

## 2019-05-02 ENCOUNTER — ANESTHESIA EVENT (OUTPATIENT)
Dept: LABOR AND DELIVERY | Facility: HOSPITAL | Age: 19
End: 2019-05-02

## 2019-05-02 ENCOUNTER — ANESTHESIA (OUTPATIENT)
Dept: LABOR AND DELIVERY | Facility: HOSPITAL | Age: 19
End: 2019-05-02

## 2019-05-02 ENCOUNTER — HOSPITAL ENCOUNTER (OUTPATIENT)
Dept: LABOR AND DELIVERY | Facility: HOSPITAL | Age: 19
Discharge: HOME | End: 2019-05-02

## 2019-05-02 DIAGNOSIS — Z34.03 ENCOUNTER FOR SUPERVISION OF NORMAL FIRST PREGNANCY IN THIRD TRIMESTER: ICD-10-CM

## 2019-05-02 PROBLEM — Z34.90 TERM PREGNANCY: Status: ACTIVE | Noted: 2019-05-02

## 2019-05-02 LAB
ABO GROUP BLD: NORMAL
BLD GP AB SCN SERPL QL: NEGATIVE
DEPRECATED RDW RBC AUTO: 45.6 FL (ref 37–54)
ERYTHROCYTE [DISTWIDTH] IN BLOOD BY AUTOMATED COUNT: 13.6 % (ref 12.3–15.4)
HCT VFR BLD AUTO: 34.8 % (ref 34–46.6)
HGB BLD-MCNC: 11.2 G/DL (ref 12–15.9)
MCH RBC QN AUTO: 29.5 PG (ref 26.6–33)
MCHC RBC AUTO-ENTMCNC: 32.2 G/DL (ref 31.5–35.7)
MCV RBC AUTO: 91.6 FL (ref 79–97)
PLATELET # BLD AUTO: 240 10*3/MM3 (ref 140–450)
PMV BLD AUTO: 11.8 FL (ref 6–12)
RBC # BLD AUTO: 3.8 10*6/MM3 (ref 3.77–5.28)
RH BLD: POSITIVE
T&S EXPIRATION DATE: NORMAL
WBC NRBC COR # BLD: 11.03 10*3/MM3 (ref 3.4–10.8)

## 2019-05-02 PROCEDURE — 86900 BLOOD TYPING SEROLOGIC ABO: CPT | Performed by: OBSTETRICS & GYNECOLOGY

## 2019-05-02 PROCEDURE — 85027 COMPLETE CBC AUTOMATED: CPT | Performed by: OBSTETRICS & GYNECOLOGY

## 2019-05-02 PROCEDURE — 25010000002 ROPIVACAINE PER 1 MG: Performed by: ANESTHESIOLOGY

## 2019-05-02 PROCEDURE — 86901 BLOOD TYPING SEROLOGIC RH(D): CPT | Performed by: OBSTETRICS & GYNECOLOGY

## 2019-05-02 PROCEDURE — C1755 CATHETER, INTRASPINAL: HCPCS | Performed by: ANESTHESIOLOGY

## 2019-05-02 PROCEDURE — C1755 CATHETER, INTRASPINAL: HCPCS

## 2019-05-02 PROCEDURE — 0KQM0ZZ REPAIR PERINEUM MUSCLE, OPEN APPROACH: ICD-10-PCS | Performed by: OBSTETRICS & GYNECOLOGY

## 2019-05-02 PROCEDURE — 3E033VJ INTRODUCTION OF OTHER HORMONE INTO PERIPHERAL VEIN, PERCUTANEOUS APPROACH: ICD-10-PCS | Performed by: OBSTETRICS & GYNECOLOGY

## 2019-05-02 PROCEDURE — S0260 H&P FOR SURGERY: HCPCS | Performed by: OBSTETRICS & GYNECOLOGY

## 2019-05-02 PROCEDURE — 10907ZC DRAINAGE OF AMNIOTIC FLUID, THERAPEUTIC FROM PRODUCTS OF CONCEPTION, VIA NATURAL OR ARTIFICIAL OPENING: ICD-10-PCS | Performed by: OBSTETRICS & GYNECOLOGY

## 2019-05-02 PROCEDURE — 59410 OBSTETRICAL CARE: CPT | Performed by: OBSTETRICS & GYNECOLOGY

## 2019-05-02 PROCEDURE — 86850 RBC ANTIBODY SCREEN: CPT | Performed by: OBSTETRICS & GYNECOLOGY

## 2019-05-02 RX ORDER — SODIUM CHLORIDE, SODIUM LACTATE, POTASSIUM CHLORIDE, CALCIUM CHLORIDE 600; 310; 30; 20 MG/100ML; MG/100ML; MG/100ML; MG/100ML
125 INJECTION, SOLUTION INTRAVENOUS CONTINUOUS
Status: DISCONTINUED | OUTPATIENT
Start: 2019-05-02 | End: 2019-05-03

## 2019-05-02 RX ORDER — METHYLERGONOVINE MALEATE 0.2 MG/ML
200 INJECTION INTRAVENOUS ONCE AS NEEDED
Status: DISCONTINUED | OUTPATIENT
Start: 2019-05-02 | End: 2019-05-03 | Stop reason: HOSPADM

## 2019-05-02 RX ORDER — ROPIVACAINE HYDROCHLORIDE 2 MG/ML
INJECTION, SOLUTION EPIDURAL; INFILTRATION; PERINEURAL CONTINUOUS PRN
Status: DISCONTINUED | OUTPATIENT
Start: 2019-05-02 | End: 2019-05-02 | Stop reason: SURG

## 2019-05-02 RX ORDER — ERYTHROMYCIN 5 MG/G
OINTMENT OPHTHALMIC
Status: DISPENSED
Start: 2019-05-02 | End: 2019-05-03

## 2019-05-02 RX ORDER — MISOPROSTOL 100 MCG
25 TABLET ORAL ONCE
Status: COMPLETED | OUTPATIENT
Start: 2019-05-02 | End: 2019-05-02

## 2019-05-02 RX ORDER — BUPIVACAINE HYDROCHLORIDE 5 MG/ML
INJECTION, SOLUTION EPIDURAL; INTRACAUDAL
Status: COMPLETED
Start: 2019-05-02 | End: 2019-05-02

## 2019-05-02 RX ORDER — EPHEDRINE SULFATE 50 MG/ML
5 INJECTION, SOLUTION INTRAVENOUS
Status: DISCONTINUED | OUTPATIENT
Start: 2019-05-02 | End: 2019-05-03 | Stop reason: HOSPADM

## 2019-05-02 RX ORDER — ROPIVACAINE HYDROCHLORIDE 2 MG/ML
10 INJECTION, SOLUTION EPIDURAL; INFILTRATION; PERINEURAL CONTINUOUS
Status: DISCONTINUED | OUTPATIENT
Start: 2019-05-02 | End: 2019-05-03

## 2019-05-02 RX ORDER — LIDOCAINE HYDROCHLORIDE AND EPINEPHRINE 15; 5 MG/ML; UG/ML
INJECTION, SOLUTION EPIDURAL AS NEEDED
Status: DISCONTINUED | OUTPATIENT
Start: 2019-05-02 | End: 2019-05-02 | Stop reason: SURG

## 2019-05-02 RX ORDER — CARBOPROST TROMETHAMINE 250 UG/ML
250 INJECTION, SOLUTION INTRAMUSCULAR AS NEEDED
Status: DISCONTINUED | OUTPATIENT
Start: 2019-05-02 | End: 2019-05-03 | Stop reason: HOSPADM

## 2019-05-02 RX ORDER — OXYTOCIN-SODIUM CHLORIDE 0.9% IV SOLN 30 UNIT/500ML 30-0.9/5 UT/ML-%
999 SOLUTION INTRAVENOUS ONCE
Status: COMPLETED | OUTPATIENT
Start: 2019-05-02 | End: 2019-05-02

## 2019-05-02 RX ORDER — LIDOCAINE HYDROCHLORIDE 10 MG/ML
30 INJECTION, SOLUTION INFILTRATION; PERINEURAL ONCE
Status: DISCONTINUED | OUTPATIENT
Start: 2019-05-03 | End: 2019-05-03

## 2019-05-02 RX ORDER — LIDOCAINE HYDROCHLORIDE 10 MG/ML
INJECTION, SOLUTION EPIDURAL; INFILTRATION; INTRACAUDAL; PERINEURAL AS NEEDED
Status: DISCONTINUED | OUTPATIENT
Start: 2019-05-02 | End: 2019-05-02 | Stop reason: SURG

## 2019-05-02 RX ORDER — PHYTONADIONE 1 MG/.5ML
INJECTION, EMULSION INTRAMUSCULAR; INTRAVENOUS; SUBCUTANEOUS
Status: DISPENSED
Start: 2019-05-02 | End: 2019-05-03

## 2019-05-02 RX ORDER — OXYTOCIN-SODIUM CHLORIDE 0.9% IV SOLN 30 UNIT/500ML 30-0.9/5 UT/ML-%
0.5 SOLUTION INTRAVENOUS CONTINUOUS
Status: ACTIVE | OUTPATIENT
Start: 2019-05-02 | End: 2019-05-02

## 2019-05-02 RX ORDER — OXYTOCIN-SODIUM CHLORIDE 0.9% IV SOLN 30 UNIT/500ML 30-0.9/5 UT/ML-%
2-20 SOLUTION INTRAVENOUS
Status: DISCONTINUED | OUTPATIENT
Start: 2019-05-02 | End: 2019-05-03

## 2019-05-02 RX ORDER — LIDOCAINE HYDROCHLORIDE 20 MG/ML
INJECTION, SOLUTION EPIDURAL; INFILTRATION; INTRACAUDAL; PERINEURAL AS NEEDED
Status: DISCONTINUED | OUTPATIENT
Start: 2019-05-02 | End: 2019-05-02 | Stop reason: SURG

## 2019-05-02 RX ORDER — OXYTOCIN-SODIUM CHLORIDE 0.9% IV SOLN 30 UNIT/500ML 30-0.9/5 UT/ML-%
125 SOLUTION INTRAVENOUS CONTINUOUS PRN
Status: DISCONTINUED | OUTPATIENT
Start: 2019-05-02 | End: 2019-05-03 | Stop reason: HOSPADM

## 2019-05-02 RX ORDER — BUPIVACAINE HYDROCHLORIDE 5 MG/ML
INJECTION, SOLUTION EPIDURAL; INTRACAUDAL AS NEEDED
Status: DISCONTINUED | OUTPATIENT
Start: 2019-05-02 | End: 2019-05-02 | Stop reason: SURG

## 2019-05-02 RX ORDER — MISOPROSTOL 200 UG/1
800 TABLET ORAL AS NEEDED
Status: DISCONTINUED | OUTPATIENT
Start: 2019-05-02 | End: 2019-05-03 | Stop reason: HOSPADM

## 2019-05-02 RX ADMIN — LIDOCAINE HYDROCHLORIDE 4 ML: 10 INJECTION, SOLUTION EPIDURAL; INFILTRATION; INTRACAUDAL; PERINEURAL at 16:27

## 2019-05-02 RX ADMIN — BUPIVACAINE HYDROCHLORIDE 3 ML: 5 INJECTION, SOLUTION EPIDURAL; INTRACAUDAL; PERINEURAL at 18:03

## 2019-05-02 RX ADMIN — MISOPROSTOL 25 MCG: 100 TABLET ORAL at 05:52

## 2019-05-02 RX ADMIN — SODIUM CHLORIDE, POTASSIUM CHLORIDE, SODIUM LACTATE AND CALCIUM CHLORIDE 999 ML/HR: 600; 310; 30; 20 INJECTION, SOLUTION INTRAVENOUS at 13:25

## 2019-05-02 RX ADMIN — OXYTOCIN 2 MILLI-UNITS/MIN: 10 INJECTION INTRAVENOUS at 11:04

## 2019-05-02 RX ADMIN — SODIUM CHLORIDE, POTASSIUM CHLORIDE, SODIUM LACTATE AND CALCIUM CHLORIDE 1000 ML: 600; 310; 30; 20 INJECTION, SOLUTION INTRAVENOUS at 05:37

## 2019-05-02 RX ADMIN — OXYTOCIN 999 ML/HR: 10 INJECTION INTRAVENOUS at 23:17

## 2019-05-02 RX ADMIN — ROPIVACAINE HYDROCHLORIDE 3 ML: 2 INJECTION, SOLUTION EPIDURAL; INFILTRATION at 18:01

## 2019-05-02 RX ADMIN — SODIUM CHLORIDE, POTASSIUM CHLORIDE, SODIUM LACTATE AND CALCIUM CHLORIDE 125 ML/HR: 600; 310; 30; 20 INJECTION, SOLUTION INTRAVENOUS at 11:36

## 2019-05-02 RX ADMIN — SODIUM CHLORIDE, POTASSIUM CHLORIDE, SODIUM LACTATE AND CALCIUM CHLORIDE 125 ML/HR: 600; 310; 30; 20 INJECTION, SOLUTION INTRAVENOUS at 16:08

## 2019-05-02 RX ADMIN — LIDOCAINE HYDROCHLORIDE 5 ML: 20 INJECTION, SOLUTION EPIDURAL; INFILTRATION; INTRACAUDAL; PERINEURAL at 21:40

## 2019-05-02 RX ADMIN — LIDOCAINE HYDROCHLORIDE AND EPINEPHRINE 3 ML: 15; 5 INJECTION, SOLUTION EPIDURAL at 14:17

## 2019-05-02 RX ADMIN — ROPIVACAINE HYDROCHLORIDE 8 ML/HR: 2 INJECTION, SOLUTION EPIDURAL; INFILTRATION at 14:23

## 2019-05-02 NOTE — PLAN OF CARE
Problem: Patient Care Overview  Goal: Plan of Care Review  Outcome: Ongoing (interventions implemented as appropriate)   05/02/19 0509   Coping/Psychosocial   Plan of Care Reviewed With patient;significant other   Plan of Care Review   Progress no change     Goal: Individualization and Mutuality  Outcome: Ongoing (interventions implemented as appropriate)   05/02/19 0509   Individualization   Patient Specific Preferences bottle feed, epidural   Patient Specific Goals (Include Timeframe) post delivery   Patient Specific Interventions IOL   Mutuality/Individual Preferences   What Anxieties, Fears, Concerns, or Questions Do You Have About Your Care? needles and blood   What Information Would Help Us Give You More Personalized Care? 1st baby   How Would You and/or Your Support Person Like to Participate in Your Care? as much as possible   Mutuality/Individual Preferences   How to Address Anxieties/Fears dry baby prior to skin to skin     Goal: Discharge Needs Assessment  Outcome: Ongoing (interventions implemented as appropriate)   05/02/19 0509   Discharge Needs Assessment   Readmission Within the Last 30 Days no previous admission in last 30 days   Concerns to be Addressed no discharge needs identified   Patient/Family Anticipates Transition to home   Patient/Family Anticipated Services at Transition none   Transportation Concerns car, none   Transportation Anticipated family or friend will provide   Anticipated Changes Related to Illness none   Disability   Equipment Currently Used at Home none       Problem: Labor (Cervical Ripen, Induct, Augment) (Adult,Obstetrics,Pediatric)  Goal: Signs and Symptoms of Listed Potential Problems Will be Absent, Minimized or Managed (Labor)   05/02/19 0509   Goal/Outcome Evaluation   Problems Assessed (Labor) all   Problems Present (Labor) none

## 2019-05-02 NOTE — H&P
[facility]  Obstetric History and Physical    Chief Complaint   Patient presents with   • Scheduled Induction       Subjective     Patient is a 19 y.o. female  currently at 39w6d, who presents for induction at term.    Her prenatal care is complicated by  urinary tract infection  uncomplicated cystitis and pylonephritis.  Her previous obstetric/gynecological history is noted for is non-contributory.    The following portions of the patients history were reviewed and updated as appropriate: current medications, allergies, past medical history, past surgical history, past family history and past social history .       Prenatal Information:  Prenatal Results     Initial Prenatal Labs     Test Value Reference Range Date Time    Hemoglobin 12.6 g/dL 11.1 - 15.9 g/dL 10/01/18 1517    Hematocrit 37.5 % 34.0 - 46.6 % 10/01/18 1517    Platelets 240 10*3/mm3 140 - 450 10*3/mm3 19 0525    Rubella IgG 2.28 index Immune >0.99 index 10/01/18 1517    Hepatitis B SAg Negative  Negative 10/01/18 1517    Hepatitis C Ab <0.1 s/co ratio 0.0 - 0.9 s/co ratio 10/01/18 1517    RPR Non Reactive  Non Reactive 10/01/18 1517    ABO A   19 0525    Rh Positive   19 0525    Antibody Screen Negative  Negative 10/01/18 1517    HIV Non Reactive  Non Reactive 10/01/18 1517    Urine Culture Final report   19 1154    Gonorrhea Negative  Negative 19 1136    Chlamydia Negative  Negative 19 1136    TSH              2nd and 3rd Trimester     Test Value Reference Range Date Time    Hemoglobin (repeated) 11.2 g/dL 12.0 - 15.9 g/dL 19 0525    Hematocrit (repeated) 34.8 % 34.0 - 46.6 % 19 0525    GCT 89 mg/dL 65 - 139 mg/dL 19 1405    Antibody Screen (repeated) Negative   19 0525    GTT Fasting        GTT 1 Hr        GTT 2 Hr        GTT 3 Hr        Group B Strep Negative  Negative 19 1154          Drug Screening     Test Value Reference Range Date Time    Amphetamine Screen Negative  ng/mL Onsfbv=5075 ng/mL 10/01/18 1506    Barbiturate Screen Negative  Negative 02/09/19 2159    Benzodiazepine Screen Negative  Negative 02/09/19 2159    Methadone Screen Negative  Negative 02/09/19 2159    Phencyclidine Screen Negative ng/mL Cutoff=25 ng/mL 10/01/18 1506    Opiates Screen Negative  Negative 02/09/19 2159    THC Screen Negative  Negative 02/09/19 2159    Cocaine Screen Negative  Negative 02/09/19 2159    Propoxyphene Screen Negative ng/mL Acbpay=444 ng/mL 10/01/18 1506    Buprenorphine Screen        Methamphetamine Screen        Oxycodone Screen Negative  Negative 02/09/19 2159    Tricyclic Antidepressants Screen              Other (Risk screening)     Test Value Reference Range Date Time    Varicella IgG        Parvovirus IgG        CMV IgG        Cystic Fibrosis        Hemoglobin electrophoresis        NIPT        MSAFP-4        AFP (for NTD only)                  External Prenatal Results     Pregnancy Outside Results - Transcribed From Office Records - See Scanned Records For Details     Test Value Date Time    Hgb 11.2 g/dL 05/02/19 0525    Hct 34.8 % 05/02/19 0525    ABO A  05/02/19 0525    Rh Positive  05/02/19 0525    Antibody Screen Negative  05/02/19 0525    Glucose Fasting GTT       Glucose Tolerance Test 1 hour       Glucose Tolerance Test 3 hour       Gonorrhea (discrete) Negative  04/18/19 1136    Chlamydia (discrete) Negative  04/18/19 1136    RPR Non Reactive  10/01/18 1517    VDRL       Syphilis Antibody       Rubella 2.28 index 10/01/18 1517    HBsAg Negative  10/01/18 1517    Herpes Simplex Virus PCR       Herpes Simplex VIrus Culture       HIV Non Reactive  10/01/18 1517    Hep C RNA Quant PCR       Hep C Antibody <0.1 s/co ratio 10/01/18 1517    AFP       Group B Strep Negative  03/29/19 1154    GBS Susceptibility to Clindamycin       GBS Susceptibility to Erythromycin       Fetal Fibronectin       Genetic Testing, Maternal Blood             Drug Screening     Test Value Date  Time    Urine Drug Screen       Amphetamine Screen Negative ng/mL 10/01/18 1506    Barbiturate Screen Negative  19 2159    Benzodiazepine Screen Negative  19 2159    Methadone Screen Negative  19 215    Phencyclidine Screen Negative ng/mL 10/01/18 1506    Opiates Screen Negative  19 2159    THC Screen Negative  19 215    Cocaine Screen       Propoxyphene Screen Negative ng/mL 10/01/18 1506    Buprenorphine Screen       Methamphetamine Screen       Oxycodone Screen Negative  19 215    Tricyclic Antidepressants Screen                    Past OB History:     Obstetric History       T0      L0     SAB0   TAB0   Ectopic0   Molar0   Multiple0   Live Births0       # Outcome Date GA Lbr Frandy/2nd Weight Sex Delivery Anes PTL Lv   1 Current                   Past Medical History: Past Medical History:   Diagnosis Date   • Anemia    • Chlamydia     tx'd 2 months ago   • Migraine    • Urinary tract infection       Past Surgical History History reviewed. No pertinent surgical history.   Family History: Family History   Problem Relation Age of Onset   • Breast cancer Neg Hx    • Ovarian cancer Neg Hx    • Uterine cancer Neg Hx    • Colon cancer Neg Hx    • Deep vein thrombosis Neg Hx    • Pulmonary embolism Neg Hx       Social History:  reports that she has never smoked. She has never used smokeless tobacco.   reports that she does not drink alcohol.   reports that she does not use drugs.        Review of Systems   Musculoskeletal: Positive for back pain.   Neurological: Positive for headaches.   All other systems reviewed and are negative.        Objective     Vital Signs Range for the last 24 hours  Temperature: Temp:  [97.9 °F (36.6 °C)-98 °F (36.7 °C)] 97.9 °F (36.6 °C)   Temp Source: Temp src: Oral   BP: BP: (111-137)/(59-92) 121/60   Pulse: Heart Rate:  [] 70   Respirations: Resp:  [16-18] 16   SPO2: SpO2:  [98 %] 98 %   O2 Amount (l/min):     O2 Devices Device  (Oxygen Therapy): room air   Weight: Weight:  [56.7 kg (125 lb)-56.8 kg (125 lb 3.2 oz)] 56.7 kg (125 lb)     Physical Examination: General appearance - alert, well appearing, and in no distress  Mental status - alert, oriented to person, place, and time  Eyes - pupils equal and reactive, extraocular eye movements intact  Neck - supple, no significant adenopathy  Chest - clear to auscultation, no wheezes, rales or rhonchi, symmetric air entry  Heart - normal rate, regular rhythm, normal S1, S2, no murmurs, rubs, clicks or gallops  Abdomen - soft, nontender, nondistended, no masses or organomegaly  Neurological - alert, oriented, normal speech, no focal findings or movement disorder noted  Extremities - peripheral pulses normal, no pedal edema, no clubbing or cyanosis  Skin - normal coloration and turgor, no rashes, no suspicious skin lesions noted    Presentation: vertex   Cervix: Exam by:  RCO   Dilation: Cervical Dilation (cm): 3   Effacement: Cervical Effacement: 70%   Station: Fetal Station: 1--> -2   AROM clear fluid     Fetal Heart Rate Assessment   Method: Fetal HR Assessment Method: external   Beats/min: Fetal HR (beats/min): 125   Baseline: Fetal Heart Baseline Rate: normal range   Variability: Fetal HR Variability: moderate (amplitude range 6 to 25 bpm)   Accels: Fetal HR Accelerations: greater than/equal to 15 bpm, lasting at least 15 seconds   Decels: Fetal HR Decelerations: absent   Tracing Category:       Uterine Assessment   Method: Method: external tocotransducer   Frequency (min): Contraction Frequency (Minutes): 1-4   Ctx Count in 10 min: Contractions in 10 Minutes: 4   Duration:     Intensity: Contraction Intensity: moderate by palpation   Intensity by IUPC:     Resting Tone: Uterine Resting Tone: soft by palpation   Resting Tone by IUPC:     [place] Units:       Laboratory Results: reviewed  Radiology Review: n/a  Other Studies: n/a    Assessment/Plan       Term pregnancy    Pyelonephritis  affecting pregnancy in third trimester    Anemia during pregnancy      Assessment & Plan    Assessment:  1.  Intrauterine pregnancy at 39w6d gestation with reactive fetal status.    2.  induction of labor  for term  with unfavorable cervix  3.  Obstetrical history significant for pyelo in 3rd trimester.  4.  GBS status:   Strep Gp B Culture   Date Value Ref Range Status   2019 Negative Negative Final     Comment:     Centers for Disease Control and Prevention (CDC) and American Congress  of Obstetricians and Gynecologists (ACOG) guidelines for prevention of   group B streptococcal (GBS) disease specify co-collection of  a vaginal and rectal swab specimen to maximize sensitivity of GBS  detection. Per the CDC and ACOG, swabbing both the lower vagina and  rectum substantially increases the yield of detection compared with  sampling the vagina alone.  Penicillin G, ampicillin, or cefazolin are indicated for intrapartum  prophylaxis of  GBS colonization. Reflex susceptibility  testing should be performed prior to use of clindamycin only on GBS  isolates from penicillin-allergic women who are considered a high risk  for anaphylaxis. Treatment with vancomycin without additional testing  is warranted if resistance to clindamycin is noted.         Plan:  1. fetal and uterine monitoring  continuously, cervical ripening with  Misoprostol, expectant management, labor augmentation  Pitocin and analgesia with  epidural  2. Plan of care has been reviewed with patient and SO  3.  Risks, benefits of treatment plan have been discussed.  4.  All questions have been answered.        Jay Castillo MD  2019  1:02 PM

## 2019-05-02 NOTE — ANESTHESIA PROCEDURE NOTES
Labor Epidural      Patient location during procedure: OB  Performed By  Anesthesiologist: Kamaljit Schmid MD  Preanesthetic Checklist  Completed: patient identified, site marked, surgical consent, pre-op evaluation, timeout performed, IV checked, risks and benefits discussed and monitors and equipment checked  Prep:  Pt Position:sitting  Sterile Tech:gloves  Prep:chlorhexidine gluconate and isopropyl alcohol  Monitoring:blood pressure monitoring, continuous pulse oximetry and EKG  Epidural Block Procedure:  Approach:midline  Guidance:palpation technique  Location:L4-L5  Needle Type:Tuohy  Needle Gauge:17 G  Loss of Resistance Medium: saline  Cath Depth at skin:8 cm  Paresthesia: none  Aspiration:negative  Test Dose:negative  Number of Attempts: 1  Post Assessment:  Dressing:occlusive dressing applied and secured with tape  Pt Tolerance:patient tolerated the procedure well with no apparent complications  Complications:no

## 2019-05-02 NOTE — PLAN OF CARE
Problem: Patient Care Overview  Goal: Plan of Care Review  Outcome: Ongoing (interventions implemented as appropriate)   05/02/19 1703   Coping/Psychosocial   Plan of Care Reviewed With patient;significant other   Plan of Care Review   Progress improving   OTHER   Outcome Summary pt now comfortable with epidural after getting a redose. pt has made cervical change with 1 milliunit of pit, pt declines using peanut ball at this time.     Goal: Individualization and Mutuality  Outcome: Ongoing (interventions implemented as appropriate)   05/02/19 1703   Individualization   Patient Specific Preferences bottle feeding, epidural, FOB at delivery   Patient Specific Goals (Include Timeframe) pain management, healthy mom and baby   Patient Specific Interventions epidural for pain management   Mutuality/Individual Preferences   What Anxieties, Fears, Concerns, or Questions Do You Have About Your Care? worried about getting the epidurak   What Information Would Help Us Give You More Personalized Care? none   How Would You and/or Your Support Person Like to Participate in Your Care? none   Mutuality/Individual Preferences   How to Address Anxieties/Fears keep pt informed     Goal: Discharge Needs Assessment  Outcome: Outcome(s) achieved Date Met: 05/02/19 05/02/19 1703   Discharge Needs Assessment   Readmission Within the Last 30 Days no previous admission in last 30 days       Problem: Labor (Cervical Ripen, Induct, Augment) (Adult,Obstetrics,Pediatric)  Goal: Signs and Symptoms of Listed Potential Problems Will be Absent, Minimized or Managed (Labor)  Outcome: Ongoing (interventions implemented as appropriate)   05/02/19 1703   Goal/Outcome Evaluation   Problems Assessed (Labor) all   Problems Present (Labor) none       Problem: Anesthesia/Analgesia, Neuraxial (Adult)  Goal: Signs and Symptoms of Listed Potential Problems Will be Absent, Minimized or Managed (Anesthesia/Analgesia, Neuraxial)  Outcome: Ongoing (interventions  implemented as appropriate)   19   Goal/Outcome Evaluation   Problems Assessed (Neuraxial Anesthesia/Analgesia) all   Problems Present (Neuraxial Anes/Analg) none       Problem: Fall Risk,  (Adult,Obstetrics,Pediatric)  Goal: Identify Related Risk Factors and Signs and Symptoms  Outcome: Ongoing (interventions implemented as appropriate)   19   Fall Risk,  (Adult,Obstetrics,Pediatric)   Related Risk Factors (Fall Risk, ) medication side effects   Signs and Symptoms (Fall Risk, ) presence of fall risk factors     Goal: Absence of Maternal Fall  Outcome: Ongoing (interventions implemented as appropriate)   19   Fall Risk,  (Adult,Obstetrics,Pediatric)   Absence of Maternal Fall achieves outcome       Problem: Skin Injury Risk (Adult)  Goal: Identify Related Risk Factors and Signs and Symptoms  Outcome: Ongoing (interventions implemented as appropriate)   19   Skin Injury Risk (Adult)   Related Risk Factors (Skin Injury Risk) medication;mobility impaired     Goal: Skin Health and Integrity  Outcome: Ongoing (interventions implemented as appropriate)   19   Skin Injury Risk (Adult)   Skin Health and Integrity achieves outcome

## 2019-05-03 LAB
BASOPHILS # BLD AUTO: 0.09 10*3/MM3 (ref 0–0.2)
BASOPHILS NFR BLD AUTO: 0.4 % (ref 0–1.5)
DEPRECATED RDW RBC AUTO: 45.8 FL (ref 37–54)
EOSINOPHIL # BLD AUTO: 0.01 10*3/MM3 (ref 0–0.4)
EOSINOPHIL NFR BLD AUTO: 0 % (ref 0.3–6.2)
ERYTHROCYTE [DISTWIDTH] IN BLOOD BY AUTOMATED COUNT: 13.6 % (ref 12.3–15.4)
HCT VFR BLD AUTO: 32.6 % (ref 34–46.6)
HGB BLD-MCNC: 10.5 G/DL (ref 12–15.9)
IMM GRANULOCYTES # BLD AUTO: 0.34 10*3/MM3 (ref 0–0.05)
IMM GRANULOCYTES NFR BLD AUTO: 1.5 % (ref 0–0.5)
LYMPHOCYTES # BLD AUTO: 2.15 10*3/MM3 (ref 0.7–3.1)
LYMPHOCYTES NFR BLD AUTO: 9.5 % (ref 19.6–45.3)
MCH RBC QN AUTO: 29.3 PG (ref 26.6–33)
MCHC RBC AUTO-ENTMCNC: 32.2 G/DL (ref 31.5–35.7)
MCV RBC AUTO: 91.1 FL (ref 79–97)
MONOCYTES # BLD AUTO: 1.88 10*3/MM3 (ref 0.1–0.9)
MONOCYTES NFR BLD AUTO: 8.3 % (ref 5–12)
NEUTROPHILS # BLD AUTO: 18.27 10*3/MM3 (ref 1.7–7)
NEUTROPHILS NFR BLD AUTO: 80.3 % (ref 42.7–76)
NRBC BLD AUTO-RTO: 0 /100 WBC (ref 0–0.2)
PLATELET # BLD AUTO: 229 10*3/MM3 (ref 140–450)
PMV BLD AUTO: 12 FL (ref 6–12)
RBC # BLD AUTO: 3.58 10*6/MM3 (ref 3.77–5.28)
WBC NRBC COR # BLD: 22.74 10*3/MM3 (ref 3.4–10.8)

## 2019-05-03 PROCEDURE — 85025 COMPLETE CBC W/AUTO DIFF WBC: CPT | Performed by: OBSTETRICS & GYNECOLOGY

## 2019-05-03 RX ORDER — BISACODYL 10 MG
10 SUPPOSITORY, RECTAL RECTAL DAILY PRN
Status: DISCONTINUED | OUTPATIENT
Start: 2019-05-03 | End: 2019-05-04 | Stop reason: HOSPADM

## 2019-05-03 RX ORDER — SODIUM CHLORIDE 0.9 % (FLUSH) 0.9 %
3 SYRINGE (ML) INJECTION EVERY 12 HOURS SCHEDULED
Status: DISCONTINUED | OUTPATIENT
Start: 2019-05-03 | End: 2019-05-03 | Stop reason: HOSPADM

## 2019-05-03 RX ORDER — DOCUSATE SODIUM 100 MG/1
100 CAPSULE, LIQUID FILLED ORAL 2 TIMES DAILY PRN
Status: DISCONTINUED | OUTPATIENT
Start: 2019-05-03 | End: 2019-05-04 | Stop reason: HOSPADM

## 2019-05-03 RX ORDER — SODIUM CHLORIDE 0.9 % (FLUSH) 0.9 %
1-10 SYRINGE (ML) INJECTION AS NEEDED
Status: DISCONTINUED | OUTPATIENT
Start: 2019-05-03 | End: 2019-05-04 | Stop reason: HOSPADM

## 2019-05-03 RX ORDER — HYDROCODONE BITARTRATE AND ACETAMINOPHEN 5; 325 MG/1; MG/1
1 TABLET ORAL EVERY 4 HOURS PRN
Status: DISCONTINUED | OUTPATIENT
Start: 2019-05-03 | End: 2019-05-04 | Stop reason: HOSPADM

## 2019-05-03 RX ORDER — ONDANSETRON 4 MG/1
4 TABLET, FILM COATED ORAL EVERY 8 HOURS PRN
Status: DISCONTINUED | OUTPATIENT
Start: 2019-05-03 | End: 2019-05-04 | Stop reason: HOSPADM

## 2019-05-03 RX ORDER — HYDROXYZINE 50 MG/1
50 TABLET, FILM COATED ORAL NIGHTLY PRN
Status: DISCONTINUED | OUTPATIENT
Start: 2019-05-03 | End: 2019-05-04 | Stop reason: HOSPADM

## 2019-05-03 RX ORDER — IBUPROFEN 600 MG/1
600 TABLET ORAL EVERY 6 HOURS PRN
Status: DISCONTINUED | OUTPATIENT
Start: 2019-05-03 | End: 2019-05-04 | Stop reason: HOSPADM

## 2019-05-03 RX ORDER — PRENATAL VIT NO.126/IRON/FOLIC 28MG-0.8MG
1 TABLET ORAL DAILY
Status: DISCONTINUED | OUTPATIENT
Start: 2019-05-03 | End: 2019-05-04 | Stop reason: HOSPADM

## 2019-05-03 RX ORDER — CALCIUM CARBONATE 200(500)MG
3 TABLET,CHEWABLE ORAL 3 TIMES DAILY PRN
Status: DISCONTINUED | OUTPATIENT
Start: 2019-05-03 | End: 2019-05-04 | Stop reason: HOSPADM

## 2019-05-03 RX ORDER — SODIUM CHLORIDE 0.9 % (FLUSH) 0.9 %
1-10 SYRINGE (ML) INJECTION AS NEEDED
Status: DISCONTINUED | OUTPATIENT
Start: 2019-05-03 | End: 2019-05-03 | Stop reason: HOSPADM

## 2019-05-03 RX ORDER — LIDOCAINE HYDROCHLORIDE 10 MG/ML
5 INJECTION, SOLUTION EPIDURAL; INFILTRATION; INTRACAUDAL; PERINEURAL AS NEEDED
Status: DISCONTINUED | OUTPATIENT
Start: 2019-05-03 | End: 2019-05-03 | Stop reason: HOSPADM

## 2019-05-03 RX ORDER — KETOROLAC TROMETHAMINE 15 MG/ML
15 INJECTION, SOLUTION INTRAMUSCULAR; INTRAVENOUS EVERY 6 HOURS PRN
Status: DISCONTINUED | OUTPATIENT
Start: 2019-05-03 | End: 2019-05-04 | Stop reason: HOSPADM

## 2019-05-03 RX ADMIN — Medication 1 TABLET: at 09:13

## 2019-05-03 RX ADMIN — Medication: at 09:12

## 2019-05-03 RX ADMIN — DOCUSATE SODIUM 100 MG: 100 CAPSULE, LIQUID FILLED ORAL at 09:13

## 2019-05-03 RX ADMIN — IBUPROFEN 600 MG: 600 TABLET ORAL at 18:47

## 2019-05-03 RX ADMIN — IBUPROFEN 600 MG: 600 TABLET ORAL at 01:25

## 2019-05-03 RX ADMIN — IBUPROFEN 600 MG: 600 TABLET ORAL at 09:13

## 2019-05-03 NOTE — NURSING NOTE
Dr Ta, anesthesia, on unit. Plan of care discussed.  Informed pt isn't feeling contractions and that she is not pushing effectively. Per MD, bolus dose will wear off and pt should feel more ctx and urge to push shortly

## 2019-05-03 NOTE — PROGRESS NOTES
Postpartum Progress Note      Status post Vaginal Delivery: Doing well postoperatively.     1) postpartum care immediately following delivery : Doing well.  Meeting postpartum milestones.  2) Anticipate d/c home tomorrow.    Rh status: A positive  Rubella: Immune  Gender: male--desires circumcision but will hold until tomorrow since baby born just before midnight.    Subjective     Postpartum Day 1: Vaginal delivery    The patient feels well. The patient denies emotional concerns. Pain is well controlled with current medications. The baby is well. The patient is ambulating well. The patient is tolerating a normal diet.     Objective     Vital signs in last 24 hours:  Temp:  [97.6 °F (36.4 °C)-98.2 °F (36.8 °C)] 98.2 °F (36.8 °C)  Heart Rate:  [] 80  Resp:  [16-18] 18  BP: ()/(54-99) 109/71      General:    alert, appears stated age and cooperative   Abdomen:  Soft, Non-tender    Lochia:  appropriate   Uterine Fundus:   firm   Ext    Edema trace   DVT Evaluation:  No evidence of DVT seen on physical exam.     Lab Results   Component Value Date    WBC 22.74 (H) 05/03/2019    HGB 10.5 (L) 05/03/2019    HCT 32.6 (L) 05/03/2019    MCV 91.1 05/03/2019     05/03/2019       Ana Lee MD  5/3/2019  12:32 PM

## 2019-05-03 NOTE — PLAN OF CARE
Problem: Patient Care Overview  Goal: Plan of Care Review  Outcome: Ongoing (interventions implemented as appropriate)   19   Coping/Psychosocial   Plan of Care Reviewed With --  family;significant other   Plan of Care Review   Progress improving --    OTHER   Outcome Summary pt now comfortable with epidural after getting a redose. pt has made cervical change with 1 milliunit of pit, pt declines using peanut ball at this time. --      Goal: Individualization and Mutuality  Outcome: Ongoing (interventions implemented as appropriate)   19   Individualization   Patient Specific Preferences bottle feeding, epidural, FOB at delivery   Patient Specific Goals (Include Timeframe) pain management, healthy mom and baby   Patient Specific Interventions epidural for pain management   Mutuality/Individual Preferences   What Information Would Help Us Give You More Personalized Care? none   How Would You and/or Your Support Person Like to Participate in Your Care? none   Mutuality/Individual Preferences   How to Address Anxieties/Fears keep pt informed       Problem: Anesthesia/Analgesia, Neuraxial (Adult)  Goal: Signs and Symptoms of Listed Potential Problems Will be Absent, Minimized or Managed (Anesthesia/Analgesia, Neuraxial)  Outcome: Ongoing (interventions implemented as appropriate)   19   Goal/Outcome Evaluation   Problems Assessed (Neuraxial Anesthesia/Analgesia) all   Problems Present (Neuraxial Anes/Analg) none       Problem: Fall Risk,  (Adult,Obstetrics,Pediatric)  Goal: Identify Related Risk Factors and Signs and Symptoms  Outcome: Ongoing (interventions implemented as appropriate)   19   Fall Risk,  (Adult,Obstetrics,Pediatric)   Related Risk Factors (Fall Risk, ) medication side effects   Signs and Symptoms (Fall Risk, ) presence of fall risk factors     Goal: Absence of Maternal Fall  Outcome: Ongoing (interventions  implemented as appropriate)   19   Fall Risk,  (Adult,Obstetrics,Pediatric)   Absence of Maternal Fall achieves outcome       Problem: Skin Injury Risk (Adult)  Goal: Identify Related Risk Factors and Signs and Symptoms  Outcome: Ongoing (interventions implemented as appropriate)   19   Skin Injury Risk (Adult)   Related Risk Factors (Skin Injury Risk) medication;mobility impaired     Goal: Skin Health and Integrity  Outcome: Ongoing (interventions implemented as appropriate)   19   Skin Injury Risk (Adult)   Skin Health and Integrity achieves outcome

## 2019-05-03 NOTE — PROGRESS NOTES
Late entry for approximately 2000  patient is now pushing with improved maternal effort.  With pushing, fetal head advances to +2 to +3 station.  Fetal status reassuring.  Pt counseled that second stage may last over three hours with reassuring fetal status. Reviewed options of operative deliver versus  section if indicated.

## 2019-05-03 NOTE — L&D DELIVERY NOTE
[facility]  Vaginal Delivery Note    Delivery     Delivery: Vaginal, Spontaneous     YOB: 2020    Time of Birth:  Gestational Age 11:13 PM   39w6d     Anesthesia: Epidural     Delivering clinician:      Forceps?   No   Vacuum? No    Shoulder dystocia present: No        Delivery narrative:    Preop diagnosis:  19 y.o.  year old  at 39w6d      H/o pyelonephritis     H/o chlamydia  Postop diagnosis: Same    Indications: Julian Fleming is a 19 y.o.  who presented at 39w6d with plan for induction of labor. Pt progressed with cytotec, pitocin and amniotomy along normal labor curve to complete dilation.  She then began pushing initially with poor effort. On initial exam, the baby was ROP.  Patient pushed for approximately 3 hours.    Findings: Male infant, Apgar 8/9, Weight pending; left periurethral and second degree perineal lacerations noted and repaired;     Procedure:The cervix was noted to be completely dilated, completely effaced, and +3 station. With one push the patient progressed to +5 station.  With good maternal effort she delivered a viable, vigorous male infant. The head presented in the OA presentation and restituted to maternal right. There was no nuchal cord present. The right anterior fetal shoulder was then easily delivered with a gentle downward motion followed by the posterior fetal shoulder, followed by the remainder of the infant without difficulty. The infant was immediately vigorous. The cord was clamped 30 seconds following delivery. The cord was cut and the infant was placed to maternal abdomen. Cord blood was then collected. The placenta then delivered spontaneously intact, and a three vessel cord was noted. Uterine massage and IV Pitocin were given until the fundus was firm. The cervix, vagina, perineum, and rectum were carefully inspected for lacerations and a second degree and left periurethral were noted. The periurethral laceration was hemostatic without repair.   The patient was given 1% lidocaine due to discomfort.  The second degree then was repaired with 3-0 vicryl in standard fashion. Counts for needles, sponges, laps and instruments were correct times two at the end of the delivery. There were no sponges left in the vagina. There were no major complications. Mother and baby were bonding well at the end of the delivery.      Infant    Findings: male  infant     Infant observations: Weight: No birth weight on file.   Length:    in  Observations/Comments:  scale 4      Apgars: 8   @ 1 minute /    9   @ 5 minutes         Placenta, Cord, and Fluid    Placenta delivered  Spontaneous  at   5/2/2019 11:17 PM     Cord:    present.   Nuchal Cord?  no   Cord blood obtained:      Cord gases obtained:       Cord gas results: Venous:  No results found for: PHCVEN    Arterial:  No results found for: PHCART     Repair    Episiotomy: Not recorded    Lacerations: Yes  Laceration Information  Laceration Repaired?   Perineal:         Periurethral:         Labial:         Sulcus:         Vaginal:         Cervical:           Suture used for repair: 3-0 Vicryl   Estimated Blood Loss: Est. Blood Loss (mL): 400 mL(Filed from Delivery Summary) (05/02/19 6403)           Complications  none    Disposition  Mother to Mother Baby/Postpartum  in stable condition currently.  Baby to remains with mom  in stable condition currently.      Pat Crowell MD  05/02/19  11:48 PM

## 2019-05-03 NOTE — PLAN OF CARE
Problem: Patient Care Overview  Goal: Plan of Care Review  Outcome: Ongoing (interventions implemented as appropriate)   19 1705   Coping/Psychosocial   Plan of Care Reviewed With patient   Plan of Care Review   Progress improving   OTHER   Outcome Summary pain well controlled, ambulating well, bottle feeding baby but needs improvement, showered today, d/c tomorrow      Goal: Individualization and Mutuality  Outcome: Ongoing (interventions implemented as appropriate)    Goal: Discharge Needs Assessment  Outcome: Ongoing (interventions implemented as appropriate)    Goal: Interprofessional Rounds/Family Conf  Outcome: Ongoing (interventions implemented as appropriate)      Problem: Fall Risk,  (Adult,Obstetrics,Pediatric)  Goal: Identify Related Risk Factors and Signs and Symptoms  Outcome: Ongoing (interventions implemented as appropriate)    Goal: Absence of Maternal Fall  Outcome: Ongoing (interventions implemented as appropriate)    Goal: Absence of [place] Fall/Drop  Outcome: Ongoing (interventions implemented as appropriate)      Problem: Skin Injury Risk (Adult)  Goal: Identify Related Risk Factors and Signs and Symptoms  Outcome: Ongoing (interventions implemented as appropriate)    Goal: Skin Health and Integrity  Outcome: Ongoing (interventions implemented as appropriate)      Problem: Postpartum (Vaginal Delivery) (Adult,Obstetrics,Pediatric)  Goal: Signs and Symptoms of Listed Potential Problems Will be Absent, Minimized or Managed (Postpartum)  Outcome: Ongoing (interventions implemented as appropriate)   19 1705   Goal/Outcome Evaluation   Problems Assessed (Postpartum Vaginal Delivery) all   Problems Present (Postpartum Vag Deliv) none

## 2019-05-03 NOTE — NURSING NOTE
Page Dr Crowell.  Informed that pt has no urge or desire to push and is pushing very poorly while looking around room.  Family is at bedside encouraging pt. Questioned if pt is feeling anxiety about pushing at the moment, pain or fear.  Pt denies all but states she wants to stop pushing for a while.  Pt legs out of stirrups and pt laying on right side with peanut ball. Will continue to increase pitocin per protocol.  Ordered by provider to resume pushing at 2045

## 2019-05-03 NOTE — PLAN OF CARE
Problem: Labor (Cervical Ripen, Induct, Augment) (Adult,Obstetrics,Pediatric)  Goal: Signs and Symptoms of Listed Potential Problems Will be Absent, Minimized or Managed (Labor)  Outcome: Outcome(s) achieved Date Met: 05/03/19

## 2019-05-03 NOTE — PROGRESS NOTES
Labor Progress Note    SUBJECTIVE:   Patient complete dilation. Feeling well overall     OBJECTIVE:   Vitals:    19 1832 19 1846 19 1901 19 1917   BP: 111/61 118/87 138/76 157/63   BP Location:    Right arm   Patient Position:    Lying   Pulse: 81 110 (!) 126 (!) 135   Resp:    18   Temp:    97.6 °F (36.4 °C)   TempSrc:    Oral   SpO2:       Weight:       Height:          Physical Examination:   General appearance - alert, well appearing, and in no distress  Chest - no tachypnea, retractions or cyanosis  Abdomen - soft, gravid, nontender  Pelvic - normal external genitalia, cervix 10/100/+1  Neurological - alert, oriented, normal speech, no focal findings or movement disorder aside form limitations from epidural  Musculoskeletal - no joint tenderness, deformity or swelling  Extremities - no pedal edema noted  Skin - normal coloration and turgor, no rashes, no suspicious skin lesions noted    FHT  normal baseline, mod variability, + accelerations, no decelerations  [place]: 2-3 ctx/ 10 minutes    ASSESSMENT:   19 y.o.  at 39w6d here for IOL    PLAN:    Labor:   Pt in second stage   Pushing with poor maternal effort   No caput, ROP   Continue coaching   Continue pitocin augmentation as needed  Pain control adequate with epidural    Fetal status Reassuring    GBS Negative    BP normal to mild range, likely due to cuff positioning but will continue to monitor  Pt with tachycardia - will monitor post delivery and pursue work up if not improved.

## 2019-05-04 VITALS
DIASTOLIC BLOOD PRESSURE: 66 MMHG | BODY MASS INDEX: 23.6 KG/M2 | TEMPERATURE: 98.3 F | WEIGHT: 125 LBS | HEART RATE: 68 BPM | SYSTOLIC BLOOD PRESSURE: 100 MMHG | HEIGHT: 61 IN | OXYGEN SATURATION: 96 % | RESPIRATION RATE: 18 BRPM

## 2019-05-04 PROBLEM — O23.03 PYELONEPHRITIS AFFECTING PREGNANCY IN THIRD TRIMESTER: Status: RESOLVED | Noted: 2019-02-10 | Resolved: 2019-05-04

## 2019-05-04 PROBLEM — O99.019 ANEMIA DURING PREGNANCY: Status: RESOLVED | Noted: 2019-02-11 | Resolved: 2019-05-04

## 2019-05-04 PROBLEM — Z34.90 TERM PREGNANCY: Status: RESOLVED | Noted: 2019-05-02 | Resolved: 2019-05-04

## 2019-05-04 PROCEDURE — 99024 POSTOP FOLLOW-UP VISIT: CPT | Performed by: OBSTETRICS & GYNECOLOGY

## 2019-05-04 RX ORDER — IBUPROFEN 600 MG/1
600 TABLET ORAL EVERY 6 HOURS PRN
Qty: 60 TABLET | Refills: 0 | Status: SHIPPED | OUTPATIENT
Start: 2019-05-04 | End: 2019-05-16 | Stop reason: SDUPTHER

## 2019-05-04 RX ORDER — PSEUDOEPHEDRINE HCL 30 MG
100 TABLET ORAL 2 TIMES DAILY PRN
Qty: 60 EACH | Refills: 0 | Status: SHIPPED | OUTPATIENT
Start: 2019-05-04 | End: 2019-06-13

## 2019-05-04 RX ADMIN — Medication 1 TABLET: at 09:07

## 2019-05-04 NOTE — PLAN OF CARE
Problem: Patient Care Overview  Goal: Plan of Care Review  Outcome: Ongoing (interventions implemented as appropriate)   05/04/19 0414   Coping/Psychosocial   Plan of Care Reviewed With patient   Plan of Care Review   Progress improving     Goal: Individualization and Mutuality  Outcome: Ongoing (interventions implemented as appropriate)    Goal: Discharge Needs Assessment  Outcome: Ongoing (interventions implemented as appropriate)      Problem: Postpartum (Vaginal Delivery) (Adult,Obstetrics,Pediatric)  Goal: Signs and Symptoms of Listed Potential Problems Will be Absent, Minimized or Managed (Postpartum)  Outcome: Ongoing (interventions implemented as appropriate)   05/04/19 0414   Goal/Outcome Evaluation   Problems Assessed (Postpartum Vaginal Delivery) all   Problems Present (Postpartum Vag Deliv) none

## 2019-05-04 NOTE — PLAN OF CARE
Problem: Patient Care Overview  Goal: Plan of Care Review  Outcome: Ongoing (interventions implemented as appropriate)   05/04/19 1453   Coping/Psychosocial   Plan of Care Reviewed With patient   Plan of Care Review   Progress improving   OTHER   Outcome Summary pain controlled. reviewed d/c instructions     Goal: Individualization and Mutuality  Outcome: Ongoing (interventions implemented as appropriate)   05/04/19 1453   Individualization   Patient Specific Preferences discharge     Goal: Discharge Needs Assessment  Outcome: Ongoing (interventions implemented as appropriate)   05/04/19 1453   Discharge Needs Assessment   Readmission Within the Last 30 Days no previous admission in last 30 days   Concerns to be Addressed no discharge needs identified   Patient/Family Anticipates Transition to home   Patient/Family Anticipated Services at Transition none   Transportation Concerns car, none   Transportation Anticipated family or friend will provide   Anticipated Changes Related to Illness none   Equipment Needed After Discharge none   Disability   Equipment Currently Used at Home none       Problem: Postpartum (Vaginal Delivery) (Adult,Obstetrics,Pediatric)  Goal: Signs and Symptoms of Listed Potential Problems Will be Absent, Minimized or Managed (Postpartum)  Outcome: Ongoing (interventions implemented as appropriate)   05/04/19 1453   Goal/Outcome Evaluation   Problems Assessed (Postpartum Vaginal Delivery) all   Problems Present (Postpartum Vag Deliv) none

## 2019-05-04 NOTE — PROGRESS NOTES
Postpartum Progress Note      Status post Vaginal Delivery: Doing well postoperatively.     1) postpartum care immediately following delivery :                    Doing well, desires discharge today    Rh status: A positive  Rubella: Immune  Gender: Male, desires circumcision. Reviewed procedure and associated risks including bleeding, infection, need for other surgeries/procedures.  Questions answered.      Discharge instructions reviewed including but not limited to: Preeclampsia precautions (return with headache, visual changes, right upper quadrant pain, concern for elevated blood pressures, other concerning symptoms), infection precautions (redness around incision, purulent drainage, temp of 100.4 or greater), return with any chest pain, shortness of breath at rest, unilateral leg swelling or calf pain, or other concerning signs or symptoms.  Patient was instructed to monitor mood and that if she feels symptoms of depression she should seek medical attention.  If thoughts of harming self or others arise, she should be seen immediately by a medical professional.      Subjective     Postpartum Day 2: Vaginal delivery    The patient feels well. The patient denies emotional concerns. Pain is well controlled with current medications. The baby is well. The patient is ambulating well. The patient is tolerating a normal diet.     Objective     Vital signs in last 24 hours:  Temp:  [97.9 °F (36.6 °C)-98.3 °F (36.8 °C)] 98.3 °F (36.8 °C)  Heart Rate:  [65-68] 68  Resp:  [18] 18  BP: ()/(60-66) 100/66      General:    alert, appears stated age and cooperative   CV: Well perfused   Lungs: No distress   Abdomen:  Soft, Non-tender    Lochia:  appropriate   Uterine Fundus:   firm, <U   Ext    Edema not present   DVT Evaluation:  No evidence of DVT seen on physical exam.     Lab Results   Component Value Date    WBC 22.74 (H) 05/03/2019    HGB 10.5 (L) 05/03/2019    HCT 32.6 (L) 05/03/2019    MCV 91.1 05/03/2019    PLT  229 05/03/2019       Pat Crowell MD  5/4/2019  10:36 AM

## 2019-05-17 RX ORDER — IBUPROFEN 600 MG/1
TABLET ORAL
Qty: 30 TABLET | Refills: 0 | Status: SHIPPED | OUTPATIENT
Start: 2019-05-17 | End: 2019-06-13

## 2019-05-22 RX ORDER — IBUPROFEN 600 MG/1
TABLET ORAL
Qty: 30 TABLET | Refills: 0 | OUTPATIENT
Start: 2019-05-22

## 2019-06-13 ENCOUNTER — POSTPARTUM VISIT (OUTPATIENT)
Dept: OBSTETRICS AND GYNECOLOGY | Facility: CLINIC | Age: 19
End: 2019-06-13

## 2019-06-13 VITALS
BODY MASS INDEX: 18.88 KG/M2 | SYSTOLIC BLOOD PRESSURE: 105 MMHG | DIASTOLIC BLOOD PRESSURE: 67 MMHG | HEART RATE: 73 BPM | HEIGHT: 61 IN | WEIGHT: 100 LBS

## 2019-06-13 LAB
B-HCG UR QL: NEGATIVE
INTERNAL NEGATIVE CONTROL: NEGATIVE
INTERNAL POSITIVE CONTROL: POSITIVE
Lab: NORMAL

## 2019-06-13 PROCEDURE — 0503F POSTPARTUM CARE VISIT: CPT | Performed by: OBSTETRICS & GYNECOLOGY

## 2019-06-13 PROCEDURE — 81025 URINE PREGNANCY TEST: CPT | Performed by: OBSTETRICS & GYNECOLOGY

## 2019-06-13 RX ORDER — NORETHINDRONE ACETATE AND ETHINYL ESTRADIOL 1MG-20(21)
1 KIT ORAL DAILY
Qty: 84 TABLET | Refills: 4 | Status: SHIPPED | OUTPATIENT
Start: 2019-06-13 | End: 2020-05-27

## 2019-06-13 NOTE — PROGRESS NOTES
"Chief Complaint   Patient presents with   • Postpartum Care     pt is here for PP care, delivered 19. Pt states she is interested on oral contraceptives.         SUBJECTIVE:   Julian Fleming is a 19 y.o.  who presents s/p  Spontaneous Vaginal Delivery on 19.  Reports no issues  Bowel and bladder function have returned to normal  Mood changes none  Son, Arvind, doing well and is with her today.  Denies that she has been smoking.  Julian Fleming is interested in contraception.   Pertinent PMH: denies migraine with aura, hypertension, family or personal history of clotting disorder, denies smoking    She has not been sexually active since delivery.    Past Medical History:   Diagnosis Date   • Anemia    • Chlamydia     tx'd 2 months ago   • Migraine    • Urinary tract infection      History reviewed. No pertinent surgical history.  Social History     Tobacco Use   • Smoking status: Never Smoker   • Smokeless tobacco: Never Used   Substance Use Topics   • Alcohol use: No     Comment: quit when finding out was pregnant   • Drug use: No     Family History   Problem Relation Age of Onset   • Breast cancer Neg Hx    • Ovarian cancer Neg Hx    • Uterine cancer Neg Hx    • Colon cancer Neg Hx    • Deep vein thrombosis Neg Hx    • Pulmonary embolism Neg Hx        Patient Active Problem List   Diagnosis   • Normal pregnancy   • Migraine   • UTI in pregnancy   • Chlamydia   • Asymptomatic bacteriuria during pregnancy        OBJECTIVE:   /67   Pulse 73   Ht 154.9 cm (60.98\")   Wt 45.4 kg (100 lb)   BMI 18.90 kg/m²    Physical Examination:   General appearance - alert, well appearing, and in no distress  Chest - no tachypnea, retractions or cyanosis  Heart - normal rate and regular rhythm  Abdomen - soft, nontender, nondistended, no masses or organomegaly;   Pelvic - normal external genitalia, vulva, vagina, cervix, uterus and adnexa, no bleeding  Neurological - screening mental status exam " normal  Musculoskeletal - no joint tenderness, deformity or swelling  Psychiatric - normal mood and affect    Lab Results   Component Value Date    WBC 22.74 (H) 2019    HGB 10.5 (L) 2019    HCT 32.6 (L) 2019    MCV 91.1 2019     2019        ASSESSMENT:   S/p     PLAN:   Doing well postpartum  Baby boy doing well  Patient was counseled on contraceptive options, including oral contraceptive pills, Depo Provera, long acting reversible contraceptive options (Nexplanon, Mirena, Paragard, Kyleena), barrier methods (such as condoms).  Patient is interested in oral contraceptive pill.  She was counseled on this methods efficacy, how to initiate this method, use of back up contraception. She was counseled on the risk of transmission of STDs and expected changes in the menstrual cycle.  She was counseled on the risks involved with this medication including but not limited to Nausea, vomiting, hypertension, headache, increased risk of venous thromboembolism.  She was encouraged if a side effect is arise she should stop the medication and seek medical attention.  At this time, may resume normal activity including intercourse and lifting.  Reviewed normal precautions.  Reviewed pap due at age 20.  Recommended follow up for annual exam or sooner with problems

## 2020-05-26 ENCOUNTER — TELEPHONE (OUTPATIENT)
Dept: OBSTETRICS AND GYNECOLOGY | Facility: CLINIC | Age: 20
End: 2020-05-26

## 2020-05-26 NOTE — TELEPHONE ENCOUNTER
Patient calling, would like to speak with you.  She doesn't know if she has a UTI or what is going on.  Bladder feels full all the time, but only pees a little dribble.  This has been going on since Saturday.  Please advise if you would like her to come in, or just leave a sample?

## 2020-05-27 ENCOUNTER — OFFICE VISIT (OUTPATIENT)
Dept: OBSTETRICS AND GYNECOLOGY | Facility: CLINIC | Age: 20
End: 2020-05-27

## 2020-05-27 VITALS
WEIGHT: 93 LBS | BODY MASS INDEX: 17.56 KG/M2 | HEIGHT: 61 IN | HEART RATE: 84 BPM | DIASTOLIC BLOOD PRESSURE: 82 MMHG | SYSTOLIC BLOOD PRESSURE: 118 MMHG

## 2020-05-27 DIAGNOSIS — R39.11 URINARY HESITANCY: Primary | ICD-10-CM

## 2020-05-27 LAB
BILIRUB BLD-MCNC: NEGATIVE MG/DL
CLARITY, POC: CLEAR
COLOR UR: YELLOW
GLUCOSE UR STRIP-MCNC: NEGATIVE MG/DL
KETONES UR QL: NEGATIVE
LEUKOCYTE EST, POC: ABNORMAL
NITRITE UR-MCNC: POSITIVE MG/ML
PH UR: 6 [PH] (ref 5–8)
PROT UR STRIP-MCNC: NEGATIVE MG/DL
RBC # UR STRIP: ABNORMAL /UL
SP GR UR: 1.01 (ref 1–1.03)
UROBILINOGEN UR QL: NORMAL

## 2020-05-27 PROCEDURE — 99213 OFFICE O/P EST LOW 20 MIN: CPT | Performed by: OBSTETRICS & GYNECOLOGY

## 2020-05-27 RX ORDER — SULFAMETHOXAZOLE AND TRIMETHOPRIM 800; 160 MG/1; MG/1
1 TABLET ORAL 2 TIMES DAILY
Qty: 6 TABLET | Refills: 0 | Status: SHIPPED | OUTPATIENT
Start: 2020-05-27 | End: 2020-07-16

## 2020-05-27 NOTE — PROGRESS NOTES
SUBJECTIVE:   Chief Complaint   Patient presents with   • Difficulty Urinating     pt reports she only urines once a day which is in the morning and only urines a little bit. She states drinking 3-4 bottle of water a day. She reports this issue started this past weekend.         Julian Fleming is a 20 y.o.  who presents for urinary hesitancy/retention that started on Saturday. She reports in AM she is able to void and this urine does not appear concentrated or dark.  Following a void her lower abdomen hurts, but not severely.  Throughout the day, she feels like she has a full bladder but cannot void.  Denies any blood in urine, fever.  Denies any surgeries or new medications.  Sometimes has back pain, left > right, but this has been coming and going since delivery.  The urinary retention is new.      Started taking AZO three days ago.  Did not notice any improvement in symptoms.     Past Medical History:   Diagnosis Date   • Anemia    • Chlamydia     tx'd 2 months ago   • Migraine    • Urinary tract infection      History reviewed. No pertinent surgical history.  OB History    Para Term  AB Living   1 1 1     1   SAB TAB Ectopic Molar Multiple Live Births           0 1      # Outcome Date GA Lbr Frandy/2nd Weight Sex Delivery Anes PTL Lv   1 Term 05/02/ 39w6d 05:59 / 04:21 3217 g (7 lb 1.5 oz) M Vag-Spont EPI N SHMUEL      Birth Comments: scale 4      Social History     Tobacco Use   • Smoking status: Never Smoker   • Smokeless tobacco: Never Used   Substance Use Topics   • Alcohol use: No     Comment: quit when finding out was pregnant   • Drug use: No     Family History   Problem Relation Age of Onset   • Breast cancer Neg Hx    • Ovarian cancer Neg Hx    • Uterine cancer Neg Hx    • Colon cancer Neg Hx    • Deep vein thrombosis Neg Hx    • Pulmonary embolism Neg Hx      Current Outpatient Medications on File Prior to Visit   Medication Sig Dispense Refill   • [DISCONTINUED] norethindrone-ethinyl  "estradiol FE (LOESTRIN FE 1/20) 1-20 MG-MCG per tablet Take 1 tablet by mouth Daily. 84 tablet 4     No current facility-administered medications on file prior to visit.      Allergies   Allergen Reactions   • Cefzil [Cefprozil] Rash        Review of Systems   Constitutional: Negative.    HENT: Negative.    Respiratory: Negative.    Cardiovascular: Negative.    Gastrointestinal: Negative.    Endocrine: Negative.    Genitourinary: Positive for decreased urine volume and difficulty urinating. Negative for dysuria, flank pain, frequency, hematuria and urgency.   Musculoskeletal: Negative.    Skin: Negative.    Neurological: Negative.    Psychiatric/Behavioral: Negative.          OBJECTIVE:   Vitals:    05/27/20 1253   BP: 118/82   Pulse: 84   Weight: 42.2 kg (93 lb)   Height: 154.9 cm (60.98\")      Physical Exam   Constitutional: She is oriented to person, place, and time. She appears well-developed and well-nourished. No distress.   HENT:   Head: Normocephalic and atraumatic.   Eyes: EOM are normal. No scleral icterus.   Neck: Normal range of motion.   Cardiovascular: Normal rate and regular rhythm.   Pulmonary/Chest: Effort normal. No respiratory distress.   Abdominal: Soft. She exhibits no distension.   Genitourinary: Uterus normal and cervix normal. There is no rash, tenderness, lesion, injury or Bartholin's cyst on the right labia. There is no rash, tenderness, lesion, injury or Bartholin's cyst on the left labia. Cervix does not exhibit motion tenderness. Right adnexum displays no mass, no tenderness and no fullness. Right adnexum is present.Left adnexum displays no mass, no tenderness and no fullness. Left adnexum is present.Vagina exhibits no lesion and no loss of rugae. No erythema, tenderness or bleeding in the vagina. No foreign body in the vagina. No signs of injury around the vagina. No vaginal discharge found.   Genitourinary Comments: No obstruction seen or palpated   Musculoskeletal: Normal range of " motion.   Neurological: She is alert and oriented to person, place, and time.   Skin: Skin is warm and dry. No rash noted. She is not diaphoretic. No erythema.   Psychiatric: She has a normal mood and affect. Her behavior is normal. Judgment and thought content normal.       Voided 80mL orange urine, postvoid residual >80mL bright yellow urine    ASSESSMENT/PLAN:     ICD-10-CM ICD-9-CM   1. Urinary hesitancy R39.11 788.64     Patient had elevated PVR.  She does have + nitrites on UA (taking Azo), Rx for Bactrim sent.  Reviewed that we will send a culture and treat for UTI, however if not improved, she would need to go to a specialist for further testing. Recommend monitoring daily UOP. With severe pain or inability to urinate she would need emergent evaluation.      Patient requests GC/CT/Trichomonas testing    Return in about 1 week (around 6/3/2020) for Recheck.

## 2020-05-28 LAB
C TRACH RRNA SPEC QL NAA+PROBE: NEGATIVE
N GONORRHOEA RRNA SPEC QL NAA+PROBE: NEGATIVE
T VAGINALIS DNA SPEC QL NAA+PROBE: NEGATIVE

## 2020-05-29 ENCOUNTER — TELEPHONE (OUTPATIENT)
Dept: OBSTETRICS AND GYNECOLOGY | Facility: CLINIC | Age: 20
End: 2020-05-29

## 2020-05-29 LAB
BACTERIA UR CULT: NORMAL
BACTERIA UR CULT: NORMAL

## 2020-05-29 NOTE — TELEPHONE ENCOUNTER
----- Message from Pat Crowell MD sent at 5/29/2020 12:55 PM EDT -----  Please inform patient of negative gonorrhea/chlamydia/trichomonas testing and normal urine culture    05/29/20  Patient is informed of negative STI results and urine culture.

## 2020-06-11 ENCOUNTER — TELEPHONE (OUTPATIENT)
Dept: OBSTETRICS AND GYNECOLOGY | Facility: CLINIC | Age: 20
End: 2020-06-11

## 2020-06-11 NOTE — TELEPHONE ENCOUNTER
Called pt about n/s on 6/9/20 pt states she forgot, pt will just keep annual exam in September.dinah

## 2020-07-16 ENCOUNTER — TELEPHONE (OUTPATIENT)
Dept: OBSTETRICS AND GYNECOLOGY | Facility: CLINIC | Age: 20
End: 2020-07-16

## 2020-07-16 ENCOUNTER — OFFICE VISIT (OUTPATIENT)
Dept: OBSTETRICS AND GYNECOLOGY | Facility: CLINIC | Age: 20
End: 2020-07-16

## 2020-07-16 VITALS
BODY MASS INDEX: 17.56 KG/M2 | SYSTOLIC BLOOD PRESSURE: 96 MMHG | DIASTOLIC BLOOD PRESSURE: 62 MMHG | HEIGHT: 61 IN | HEART RATE: 85 BPM | WEIGHT: 93 LBS

## 2020-07-16 DIAGNOSIS — N92.6 MISSED MENSES: Primary | ICD-10-CM

## 2020-07-16 PROCEDURE — 99213 OFFICE O/P EST LOW 20 MIN: CPT | Performed by: OBSTETRICS & GYNECOLOGY

## 2020-07-16 PROCEDURE — 81025 URINE PREGNANCY TEST: CPT | Performed by: OBSTETRICS & GYNECOLOGY

## 2020-07-16 NOTE — TELEPHONE ENCOUNTER
If she is bleeding through a pad an hour or having severe pain, she should go to the ED. If the bleeding is light and no pain, we got an hcg level today. Please see if she can come in for an US tomorrow and the sonographer can call me with the results (I am on call so will not be in the office tomorrow). Thanks!

## 2020-07-16 NOTE — TELEPHONE ENCOUNTER
Patient states that she is bleeding like regular cycle bleeding, and is not in pain, but does feel 'really tiny cramping'

## 2020-07-16 NOTE — PROGRESS NOTES
SUBJECTIVE:   Chief Complaint   Patient presents with   • positive pregnancy test     pt had a positive pregnancy test here in office today. Pt is unsure at the moment if she wants to continue with this prengnacy or not.         Julian Fleming is a 20 y.o.  who presents for newly positive pregnancy test. Patient's last menstrual period was 2020 (within days).     She is unsure how she would like to proceed with this pregnancy. Reports the intercourse was consensual.  Denies any physical or verbal or emotional abuse.      C/o spotting x 1 day in the last week since her positive pregnancy test; not bleeding currently. Was not on contraception. Denies risks for STDs. Denies new sexual partner. Declines STI testing    Past Medical History:   Diagnosis Date   • Anemia    • Chlamydia     tx'd 2 months ago   • Migraine    • Urinary tract infection      History reviewed. No pertinent surgical history.  OB History    Para Term  AB Living   2 1 1     1   SAB TAB Ectopic Molar Multiple Live Births           0 1      # Outcome Date GA Lbr Frandy/2nd Weight Sex Delivery Anes PTL Lv   2 Current            1 Term 19 39w6d 05:59 / 04:21 3217 g (7 lb 1.5 oz) M Vag-Spont EPI N SHMUEL      Birth Comments: scale 4      Social History     Tobacco Use   • Smoking status: Never Smoker   • Smokeless tobacco: Never Used   Substance Use Topics   • Alcohol use: No     Comment: quit when finding out was pregnant   • Drug use: No     Family History   Problem Relation Age of Onset   • Breast cancer Neg Hx    • Ovarian cancer Neg Hx    • Uterine cancer Neg Hx    • Colon cancer Neg Hx    • Deep vein thrombosis Neg Hx    • Pulmonary embolism Neg Hx      Current Outpatient Medications on File Prior to Visit   Medication Sig Dispense Refill   • [DISCONTINUED] sulfamethoxazole-trimethoprim (Bactrim DS) 800-160 MG per tablet Take 1 tablet by mouth 2 (Two) Times a Day. 6 tablet 0     No current facility-administered medications  "on file prior to visit.      Allergies   Allergen Reactions   • Cefzil [Cefprozil] Rash        Review of Systems   Constitutional: Negative.    HENT: Negative.    Respiratory: Negative.    Cardiovascular: Negative.    Gastrointestinal: Negative.    Endocrine: Negative.    Genitourinary: Positive for menstrual problem and vaginal bleeding.   Musculoskeletal: Negative.    Skin: Negative.    Neurological: Negative.    Psychiatric/Behavioral: Negative.          OBJECTIVE:   Vitals:    07/16/20 1035   BP: 96/62   Pulse: 85   Weight: 42.2 kg (93 lb)   Height: 154.9 cm (60.98\")      Physical Exam   Constitutional: She is oriented to person, place, and time. She appears well-developed and well-nourished. No distress.   HENT:   Head: Normocephalic and atraumatic.   Eyes: EOM are normal. No scleral icterus.   Neck: Normal range of motion.   Cardiovascular: Normal rate and regular rhythm.   Pulmonary/Chest: Effort normal. No respiratory distress.   Abdominal: Soft. She exhibits no distension.   Musculoskeletal: Normal range of motion.   Neurological: She is alert and oriented to person, place, and time.   Skin: Skin is warm and dry. No rash noted. She is not diaphoretic. No erythema.   Psychiatric: She has a normal mood and affect. Her behavior is normal. Judgment and thought content normal.       ASSESSMENT/PLAN:     ICD-10-CM ICD-9-CM   1. Missed menses N92.6 626.4     I spent at least 10 minutes of 15 minute visit in face-to-face counseling on options for management of this pregnancy including continuation, adoption, termination. She was provided information and resources for adoption and termination.  We reviewed that given her bleeding, she can get an hcg and depending on the range we may need to follow up with a repeat or an US to rule out abnormal pregnancy (such as ectopic).  Bleeding and pain precautions reviewed. She will call if she does not hear from us regarding the hcg by Monday.  She will RTO or call once she " decides how she would like to proceed with this pregnancy. For contraception, she desires a Mirena IUD.         Return in about 4 weeks (around 8/13/2020).

## 2020-07-16 NOTE — TELEPHONE ENCOUNTER
Patient states that she is bleeding again, and when she was here, she was not. She would like to know what she needs to do. Please advise?

## 2020-07-17 ENCOUNTER — PROCEDURE VISIT (OUTPATIENT)
Dept: OBSTETRICS AND GYNECOLOGY | Facility: CLINIC | Age: 20
End: 2020-07-17

## 2020-07-17 DIAGNOSIS — O41.8X10 SUBCHORIONIC HEMATOMA IN FIRST TRIMESTER, SINGLE OR UNSPECIFIED FETUS: ICD-10-CM

## 2020-07-17 DIAGNOSIS — O20.9 BLEEDING IN EARLY PREGNANCY: Primary | ICD-10-CM

## 2020-07-17 DIAGNOSIS — O46.8X1 SUBCHORIONIC HEMATOMA IN FIRST TRIMESTER, SINGLE OR UNSPECIFIED FETUS: ICD-10-CM

## 2020-07-17 LAB — HCG INTACT+B SERPL-ACNC: NORMAL MIU/ML

## 2020-07-17 PROCEDURE — 76817 TRANSVAGINAL US OBSTETRIC: CPT | Performed by: OBSTETRICS & GYNECOLOGY

## 2020-07-23 ENCOUNTER — PROCEDURE VISIT (OUTPATIENT)
Dept: OBSTETRICS AND GYNECOLOGY | Facility: CLINIC | Age: 20
End: 2020-07-23

## 2020-07-23 ENCOUNTER — OFFICE VISIT (OUTPATIENT)
Dept: OBSTETRICS AND GYNECOLOGY | Facility: CLINIC | Age: 20
End: 2020-07-23

## 2020-07-23 VITALS
HEART RATE: 74 BPM | HEIGHT: 61 IN | SYSTOLIC BLOOD PRESSURE: 91 MMHG | DIASTOLIC BLOOD PRESSURE: 54 MMHG | WEIGHT: 94 LBS | BODY MASS INDEX: 17.75 KG/M2

## 2020-07-23 DIAGNOSIS — O46.8X1 SUBCHORIONIC HEMATOMA IN FIRST TRIMESTER, SINGLE OR UNSPECIFIED FETUS: Primary | ICD-10-CM

## 2020-07-23 DIAGNOSIS — O41.8X10 SUBCHORIONIC HEMATOMA IN FIRST TRIMESTER, SINGLE OR UNSPECIFIED FETUS: Primary | ICD-10-CM

## 2020-07-23 PROCEDURE — 76817 TRANSVAGINAL US OBSTETRIC: CPT | Performed by: OBSTETRICS & GYNECOLOGY

## 2020-07-23 PROCEDURE — 99213 OFFICE O/P EST LOW 20 MIN: CPT | Performed by: OBSTETRICS & GYNECOLOGY

## 2020-07-23 NOTE — PROGRESS NOTES
SUBJECTIVE:   Chief Complaint   Patient presents with   • Vaginal Bleeding     pt reports she has not bled since  night. Pt denies crampis or pains.         Julian Fleming is a 20 y.o.  who presents for follow up of hematoma and bleeding in early pregnancy. She stopped bleeding on , no pain. Still unsure of how she would like to proceed with pregnancy.    Past Medical History:   Diagnosis Date   • Anemia    • Chlamydia     tx'd 2 months ago   • Migraine    • Urinary tract infection      History reviewed. No pertinent surgical history.  OB History    Para Term  AB Living   2 1 1     1   SAB TAB Ectopic Molar Multiple Live Births           0 1      # Outcome Date GA Lbr Frandy/2nd Weight Sex Delivery Anes PTL Lv   2 Current            1 Term 05/02/19 39w6d 05:59 / 04:21 3217 g (7 lb 1.5 oz) M Vag-Spont EPI N SHMUEL      Birth Comments: scale 4      Social History     Tobacco Use   • Smoking status: Never Smoker   • Smokeless tobacco: Never Used   Substance Use Topics   • Alcohol use: No     Comment: quit when finding out was pregnant   • Drug use: No     Family History   Problem Relation Age of Onset   • Breast cancer Neg Hx    • Ovarian cancer Neg Hx    • Uterine cancer Neg Hx    • Colon cancer Neg Hx    • Deep vein thrombosis Neg Hx    • Pulmonary embolism Neg Hx      No current outpatient medications on file prior to visit.     No current facility-administered medications on file prior to visit.      Allergies   Allergen Reactions   • Cefzil [Cefprozil] Rash        Review of Systems   Constitutional: Negative for activity change, appetite change, fatigue, fever and unexpected weight change.   Gastrointestinal: Negative for abdominal pain, nausea and vomiting.   Genitourinary: Positive for menstrual problem and vaginal bleeding. Negative for vaginal discharge and vaginal pain.   Hematological: Does not bruise/bleed easily.   Psychiatric/Behavioral: Negative for agitation.         OBJECTIVE:  "  Vitals:    07/23/20 1151   BP: 91/54   Pulse: 74   Weight: 42.6 kg (94 lb)   Height: 154.9 cm (60.98\")      Physical Exam   Constitutional: She is oriented to person, place, and time. She appears well-developed and well-nourished. No distress.   HENT:   Head: Normocephalic and atraumatic.   Eyes: EOM are normal. No scleral icterus.   Neck: Normal range of motion.   Cardiovascular: Normal rate and regular rhythm.   Pulmonary/Chest: Effort normal. No respiratory distress.   Abdominal: Soft. She exhibits no distension.   Musculoskeletal: Normal range of motion.   Neurological: She is alert and oriented to person, place, and time.   Skin: Skin is warm and dry. No rash noted. She is not diaphoretic. No erythema.   Psychiatric: She has a normal mood and affect. Her behavior is normal. Judgment and thought content normal.       ASSESSMENT/PLAN:     ICD-10-CM ICD-9-CM   1. Subchorionic hematoma in first trimester, single or unspecified fetus O41.8X10 656.83    O46.8X1        Reviewed ultrasound from today as compared with Friday.    Patient is unsure if she wants to continue the pregnancy. She was counseled that while a subchorionic hematoma increases her risk for miscarriage but is not diagnostic or by any means certain of a pregnancy loss.  She is undecided how she would like to proceed with the pregnancy but will discuss with her partner.  Recommend follow up in 4 weeks to discuss contraception or for New OB.  Sooner if she has bleeding or pain. Pt expressed agreement.     Return in about 4 weeks (around 8/20/2020).            "

## 2020-08-20 ENCOUNTER — INITIAL PRENATAL (OUTPATIENT)
Dept: OBSTETRICS AND GYNECOLOGY | Facility: CLINIC | Age: 20
End: 2020-08-20

## 2020-08-20 VITALS — DIASTOLIC BLOOD PRESSURE: 60 MMHG | WEIGHT: 101 LBS | BODY MASS INDEX: 19.09 KG/M2 | SYSTOLIC BLOOD PRESSURE: 101 MMHG

## 2020-08-20 DIAGNOSIS — Z34.80 SUPERVISION OF OTHER NORMAL PREGNANCY, ANTEPARTUM: Primary | ICD-10-CM

## 2020-08-20 PROBLEM — Z34.90 NORMAL PREGNANCY: Status: RESOLVED | Noted: 2018-10-01 | Resolved: 2020-08-20

## 2020-08-20 PROBLEM — A74.9 CHLAMYDIA: Status: RESOLVED | Noted: 2018-10-05 | Resolved: 2020-08-20

## 2020-08-20 PROBLEM — O23.40 UTI IN PREGNANCY: Status: RESOLVED | Noted: 2018-10-05 | Resolved: 2020-08-20

## 2020-08-20 PROBLEM — O99.891 ASYMPTOMATIC BACTERIURIA DURING PREGNANCY: Status: RESOLVED | Noted: 2018-11-02 | Resolved: 2020-08-20

## 2020-08-20 PROBLEM — R82.71 ASYMPTOMATIC BACTERIURIA DURING PREGNANCY: Status: RESOLVED | Noted: 2018-11-02 | Resolved: 2020-08-20

## 2020-08-20 LAB
GLUCOSE UR STRIP-MCNC: NEGATIVE MG/DL
PROT UR STRIP-MCNC: NEGATIVE MG/DL

## 2020-08-20 PROCEDURE — 99214 OFFICE O/P EST MOD 30 MIN: CPT | Performed by: OBSTETRICS & GYNECOLOGY

## 2020-08-20 NOTE — PROGRESS NOTES
"Initial OB Visit    Chief Complaint   Patient presents with   • Initial Prenatal Visit        Julian Fleming is being seen today for her first obstetrical visit.  She is a 20 y.o.    11w3d gestation.   FOB: \"[place]\"  This is not a planned pregnancy. She has decided to continue with the pregnancy  Denies spotting, bleeding, cramping  #: 1, Date: 19, Sex: Male, Weight: 3217 g (7 lb 1.5 oz), GA: 39w6d, Delivery: Vaginal, Spontaneous, Apgar1: 8, Apgar5: 9, Living: Living, Birth Comments: scale 4    #: 2, Date: None, Sex: None, Weight: None, GA: None, Delivery: None, Apgar1: None, Apgar5: None, Living: None, Birth Comments: None      Current obstetric complaints: migraines on occasion - Reports was on magnesium, gabapentin and another medication she cannot recall for this (stopped beginning July).    Prior obstetric issues, potential pregnancy concerns:   Family history of genetic issues (includes FOB): None known  Prior infections concerning in pregnancy (Rash, fever since LMP): No  Prior testing for Cystic Fibrosis Carrier or Sickle Cell Trait- yes - negative last pregnancy  History of abnormal pap smears: No  History of STIs: chlamydia  Prepregnancy BMI - Body mass index is 19.09 kg/m².    Past Medical History:   Diagnosis Date   • Anemia    • Chlamydia    • Migraine    • Urinary tract infection      OB History    Para Term  AB Living   2 1 1     1   SAB TAB Ectopic Molar Multiple Live Births           0 1      # Outcome Date GA Lbr Frandy/2nd Weight Sex Delivery Anes PTL Lv   2 Current            1 Term 19 39w6d 05:59 / 04:21 3217 g (7 lb 1.5 oz) M Vag-Spont EPI N SHMUEL      Birth Comments: scale 4      History reviewed. No pertinent surgical history.      Current Outpatient Medications:   •  PRENATAL GUMMY VITAMIN, Chew 1 each Daily., Disp: , Rfl:     Allergies   Allergen Reactions   • Cefzil [Cefprozil] Rash       Social History     Socioeconomic History   • Marital status: Single     " Spouse name: Not on file   • Number of children: Not on file   • Years of education: Not on file   • Highest education level: Not on file   Tobacco Use   • Smoking status: Never Smoker   • Smokeless tobacco: Never Used   Substance and Sexual Activity   • Alcohol use: No     Comment: quit when finding out was pregnant   • Drug use: No   • Sexual activity: Yes     Partners: Male     Birth control/protection: None       Family History   Problem Relation Age of Onset   • Breast cancer Neg Hx    • Ovarian cancer Neg Hx    • Uterine cancer Neg Hx    • Colon cancer Neg Hx    • Deep vein thrombosis Neg Hx    • Pulmonary embolism Neg Hx        Review of systems     Constitutional: negative for chills, fevers and negative for fatigue  Eyes: negative  Ears, nose, mouth, throat, and face: negative for hearing loss and nasal congestion  Respiratory: negative for asthma and wheezing  Cardiovascular: negative for chest pain and dyspnea  Gastrointestinal: negative for dyspepsia, dysphagia abdominal pain  Genitourinary:negative for urinary incontinence  Integument/breast: negative for breast lump  Hematologic/lymphatic: negative for bleeding  Musculoskeletal:negative for aches  Neurological: negative for numbness/tingling  Behavioral/Psych: negative for anhedonia  Allergic/Immunologic: negative for rash, allergy         Objective    /60   Wt 45.8 kg (101 lb)   LMP 05/31/2020 (Within Days)   BMI 19.09 kg/m²       General Appearance:    Alert, cooperative, in no acute distress, habitus normal   Head:    Normocephalic, without obvious abnormality, atraumatic   Eyes:            Lids and lashes normal, conjunctivae and sclerae normal, no   icterus, no pallor, corneas clear   Ears:    Ears appear intact with no abnormalities noted       Neck:   No adenopathy, supple, trachea midline, no thyromegaly   Back:     No kyphosis present, no scoliosis present,                       Lungs:     Clear to auscultation,respirations regular,  even and                   unlabored    Heart:    Regular rhythm and normal rate, normal S1 and S2, no            murmur, no gallop, no rub, no click   Breast Exam:    No masses, No nipple discharge   Abdomen:     Normal bowel sounds, no masses, no organomegaly, soft        non-tender, non-distended, no guarding, no rebound                 tenderness   Genitalia:    Vulva - BUS-WNL, NEFG    Vagina - No discharge, No bleeding    Cervix - No Lesions, closed     Uterus - Consistent with 9 weeks    Adnexa - No cordell, NT    Pelvimetry - clinically adequate, gynecoid pelvis     Extremities:   Moves all extremities well, no edema, no cyanosis, no              redness   Pulses:   Pulses palpable and equal bilaterally   Skin:   No bleeding, bruising or rash   Lymph nodes:   No palpable adenopathy   Neurologic:   Sensation intact, A&O times 3      Assessment  Pregnancy at 11w3d   Subchorionic hemorrhage  H/o STIs     Plan    Initial labs drawn, GC/CHL screen done  Patient is on Prenatal vitamins  Problem list reviewed and updated.  Reviewed routine prenatal care with the office to include but not limited to:   Zika (travel restrictions/ok to use insect repellant), not to changing cat litter, food restrictions, avoidance of alcohol, tobacco and drugs and saunas/hot tubs, anticipated weight gain/nutrition requirements.  Reviewed nature of practice and hospital.  Reviewed recommended follow up, importance of compliance with care. We reviewed testing in pregnancy including HIV testing and urine drug screen.    Had prior negative CF/SMA  Reviewed aneuploidy screening - Reviewed limitations of screening including false positive and false negative rate.  Reviewed aneuploidy testing options.  Patient will review and decided at next visit.  Discussed medication safety in pregnancy for migraines.  Patient okay on no medication currently.  Counseled on limitations of ultrasound in pregnancy.  All questions answered.       Patient Active  Problem List    Diagnosis Date Noted   • Migraine 10/01/2018     Note Last Updated: 10/1/2018     Followed with Dr. Thapa  Was on Trokendi, Gabapentin, Magnesium         Pat Crowell MD

## 2020-08-21 LAB
ABO GROUP BLD: NORMAL
AMPHETAMINES UR QL SCN: NEGATIVE NG/ML
BARBITURATES UR QL SCN: NEGATIVE NG/ML
BASOPHILS # BLD AUTO: 0 X10E3/UL (ref 0–0.2)
BASOPHILS NFR BLD AUTO: 1 %
BENZODIAZ UR QL: NEGATIVE NG/ML
BLD GP AB SCN SERPL QL: NEGATIVE
BZE UR QL: NEGATIVE NG/ML
CANNABINOIDS UR QL SCN: NEGATIVE NG/ML
EOSINOPHIL # BLD AUTO: 0.1 X10E3/UL (ref 0–0.4)
EOSINOPHIL NFR BLD AUTO: 2 %
ERYTHROCYTE [DISTWIDTH] IN BLOOD BY AUTOMATED COUNT: 12.9 % (ref 11.7–15.4)
HBV SURFACE AG SERPL QL IA: NEGATIVE
HCT VFR BLD AUTO: 35.1 % (ref 34–46.6)
HCV AB S/CO SERPL IA: <0.1 S/CO RATIO (ref 0–0.9)
HGB BLD-MCNC: 11.6 G/DL (ref 11.1–15.9)
HIV 1+2 AB+HIV1 P24 AG SERPL QL IA: NON REACTIVE
IMM GRANULOCYTES # BLD AUTO: 0 X10E3/UL (ref 0–0.1)
IMM GRANULOCYTES NFR BLD AUTO: 0 %
LYMPHOCYTES # BLD AUTO: 2.1 X10E3/UL (ref 0.7–3.1)
LYMPHOCYTES NFR BLD AUTO: 25 %
MCH RBC QN AUTO: 29.9 PG (ref 26.6–33)
MCHC RBC AUTO-ENTMCNC: 33 G/DL (ref 31.5–35.7)
MCV RBC AUTO: 91 FL (ref 79–97)
METHADONE UR QL SCN: NEGATIVE NG/ML
MONOCYTES # BLD AUTO: 0.6 X10E3/UL (ref 0.1–0.9)
MONOCYTES NFR BLD AUTO: 7 %
NEUTROPHILS # BLD AUTO: 5.8 X10E3/UL (ref 1.4–7)
NEUTROPHILS NFR BLD AUTO: 65 %
OPIATES UR QL: NEGATIVE NG/ML
PCP UR QL: NEGATIVE NG/ML
PLATELET # BLD AUTO: 294 X10E3/UL (ref 150–450)
PROPOXYPH UR QL SCN: NEGATIVE NG/ML
RBC # BLD AUTO: 3.88 X10E6/UL (ref 3.77–5.28)
RH BLD: POSITIVE
RPR SER QL: NON REACTIVE
RUBV IGG SERPL IA-ACNC: 1.66 INDEX
WBC # BLD AUTO: 8.7 X10E3/UL (ref 3.4–10.8)

## 2020-08-22 LAB
BACTERIA UR CULT: ABNORMAL
BACTERIA UR CULT: ABNORMAL
C TRACH RRNA SPEC QL NAA+PROBE: NEGATIVE
N GONORRHOEA RRNA SPEC QL NAA+PROBE: NEGATIVE
OTHER ANTIBIOTIC SUSC ISLT: ABNORMAL
T VAGINALIS DNA SPEC QL NAA+PROBE: NEGATIVE

## 2020-08-24 ENCOUNTER — TELEPHONE (OUTPATIENT)
Dept: OBSTETRICS AND GYNECOLOGY | Facility: CLINIC | Age: 20
End: 2020-08-24

## 2020-08-24 RX ORDER — NITROFURANTOIN 25; 75 MG/1; MG/1
100 CAPSULE ORAL 2 TIMES DAILY
Qty: 6 CAPSULE | Refills: 0 | Status: SHIPPED | OUTPATIENT
Start: 2020-08-24 | End: 2020-09-17

## 2020-08-24 NOTE — TELEPHONE ENCOUNTER
----- Message from EMILIANO Nolasco sent at 8/24/2020 10:44 AM EDT -----  Please let the pt know that her urine culture was positive for UTI. I have sent a prescription to her pharmacy that is safe in pregnancy. She is a pt of Dr. Soto    Thanks,    Florina

## 2020-08-25 LAB
CONV .: NORMAL
CYTOLOGIST CVX/VAG CYTO: NORMAL
CYTOLOGY CVX/VAG DOC CYTO: NORMAL
CYTOLOGY CVX/VAG DOC THIN PREP: NORMAL
DX ICD CODE: NORMAL
HIV 1 & 2 AB SER-IMP: NORMAL
Lab: NORMAL
OTHER STN SPEC: NORMAL
STAT OF ADQ CVX/VAG CYTO-IMP: NORMAL

## 2020-09-17 ENCOUNTER — ROUTINE PRENATAL (OUTPATIENT)
Dept: OBSTETRICS AND GYNECOLOGY | Facility: CLINIC | Age: 20
End: 2020-09-17

## 2020-09-17 VITALS — SYSTOLIC BLOOD PRESSURE: 102 MMHG | DIASTOLIC BLOOD PRESSURE: 69 MMHG | BODY MASS INDEX: 19.09 KG/M2 | WEIGHT: 101 LBS

## 2020-09-17 DIAGNOSIS — Z34.80 SUPERVISION OF OTHER NORMAL PREGNANCY, ANTEPARTUM: Primary | ICD-10-CM

## 2020-09-17 PROCEDURE — 99213 OFFICE O/P EST LOW 20 MIN: CPT | Performed by: OBSTETRICS & GYNECOLOGY

## 2020-09-17 NOTE — PROGRESS NOTES
Chief Complaint   Patient presents with   • Routine Prenatal Visit     pt states she believes she has been exposed to STD       Julian Fleming is a 20 y.o.  at 15w3d   No bleeding, no cramping, no LOF  She was with the same partner but feels that he has been unfaithful. They are sharing custody.  She moved out and lives with her grandma.   No symptoms at this time. Completed antibiotic for bacteruria  /69   Wt 45.8 kg (101 lb)   LMP 2020 (Within Days)   BMI 19.09 kg/m²    Gen: NAD, well appearing  Abd: gravid, nontender  See OB Flowsheet    ASSESSMENT:   1. IUP at 15w3d   2. Asymptomatic bacteruria  3. H/o STIs, desires repeat testing  PLAN:  Reviewed patient safety. She feels safe and is staying with her grandmother.  Declines cell free DNA/Trisomy testing.  Reviewed common second trimester symptoms.  Reviewed precautions for signs of  labor, anticipated fetal movements.     Repeat STI testing (swab, declines serum), repeat urine culture today  Return in about 4 weeks (around 10/15/2020) for anatomy US, OB visit.  Orders Placed This Encounter   Procedures   • Urine Culture - Urine, Urine, Clean Catch   • Chlamydia trachomatis, Neisseria gonorrhoeae, Trichomonas vaginalis, PCR - Swab, Vagina   • US Ob 14 + Weeks Single or First Gestation     Standing Status:   Future     Standing Expiration Date:   2021     Order Specific Question:   Reason for Exam:     Answer:   anatomy

## 2020-09-19 LAB
BACTERIA UR CULT: NO GROWTH
BACTERIA UR CULT: NORMAL
C TRACH RRNA SPEC QL NAA+PROBE: NEGATIVE
N GONORRHOEA RRNA SPEC QL NAA+PROBE: NEGATIVE
T VAGINALIS DNA SPEC QL NAA+PROBE: NEGATIVE

## 2020-10-14 ENCOUNTER — ROUTINE PRENATAL (OUTPATIENT)
Dept: OBSTETRICS AND GYNECOLOGY | Facility: CLINIC | Age: 20
End: 2020-10-14

## 2020-10-14 VITALS — WEIGHT: 105 LBS | SYSTOLIC BLOOD PRESSURE: 101 MMHG | DIASTOLIC BLOOD PRESSURE: 67 MMHG | BODY MASS INDEX: 19.85 KG/M2

## 2020-10-14 DIAGNOSIS — Z34.80 SUPERVISION OF OTHER NORMAL PREGNANCY, ANTEPARTUM: Primary | ICD-10-CM

## 2020-10-14 LAB
GLUCOSE UR STRIP-MCNC: NEGATIVE MG/DL
PROT UR STRIP-MCNC: NEGATIVE MG/DL

## 2020-10-14 PROCEDURE — 99213 OFFICE O/P EST LOW 20 MIN: CPT | Performed by: OBSTETRICS & GYNECOLOGY

## 2020-10-16 NOTE — PROGRESS NOTES
Chief Complaint   Patient presents with   • Routine Prenatal Visit      Julian Fleming is a 20 y.o.  at 19w4d   No bleeding, no cramping, no LOF  Fetal movement notec    /67   Wt 47.6 kg (105 lb)   LMP 2020 (Within Days)   BMI 19.85 kg/m²    Gen: NAD, well appearing  Abd: gravid, nontender    See OB Flowsheet    ASSESSMENT:   1. IUP at 19w4d   2. Asymptomatic bacteruria    PLAN:  Aware of negative GC/cT from .  Reviewed anatomy US from today and limitations of anatomy US in detecting aneuploidy/some fetal anomalies.   Reviewed common symptoms of the third trimester.  Counseled on labor precautions and anticipated fetal movements.  Reviewed kick counts.  Patient is aware of the location of L&D.     Last urine culture negative  Return in about 4 weeks (around 2020).  Orders Placed This Encounter   Procedures   • POC Urinalysis Dipstick     This is an external result entered through the Results Console.

## 2020-11-12 ENCOUNTER — ROUTINE PRENATAL (OUTPATIENT)
Dept: OBSTETRICS AND GYNECOLOGY | Facility: CLINIC | Age: 20
End: 2020-11-12

## 2020-11-12 VITALS — SYSTOLIC BLOOD PRESSURE: 108 MMHG | BODY MASS INDEX: 21.55 KG/M2 | DIASTOLIC BLOOD PRESSURE: 63 MMHG | WEIGHT: 114 LBS

## 2020-11-12 DIAGNOSIS — Z34.80 SUPERVISION OF OTHER NORMAL PREGNANCY, ANTEPARTUM: Primary | ICD-10-CM

## 2020-11-12 LAB
GLUCOSE UR STRIP-MCNC: NEGATIVE MG/DL
PROT UR STRIP-MCNC: NEGATIVE MG/DL

## 2020-11-12 PROCEDURE — 99213 OFFICE O/P EST LOW 20 MIN: CPT | Performed by: OBSTETRICS & GYNECOLOGY

## 2020-11-12 NOTE — PROGRESS NOTES
Chief Complaint   Patient presents with   • Routine Prenatal Visit      Julian Fleming is a 20 y.o.  at 23w3d   No bleeding, no LOF; no cramping  Fetal movement normal    /63   Wt 51.7 kg (114 lb)   LMP 2020 (Within Days)   BMI 21.55 kg/m²    Gen: NAD, well appearing  Abd: nontender  See OB Flowsheet    ASSESSMENT:   1. IUP at 23w3d   2. Asymptomatic bacteruria    PLAN:  Declines flu.  Counseled on risks of flu in pregnancy.  Counseled on benefits of vaccination.  Counseled on use of Tamiflu for prophylaxis if exposed to flu or for treatment with onset of flu like symptoms.   Reviewed common second trimester symptoms.  Reviewed precautions for signs of  labor, anticipated fetal movements.      Return in about 2 weeks (around 2020).  No orders of the defined types were placed in this encounter.

## 2020-12-06 ENCOUNTER — HOSPITAL ENCOUNTER (INPATIENT)
Facility: HOSPITAL | Age: 20
LOS: 2 days | Discharge: HOME | End: 2020-12-09
Attending: OBSTETRICS & GYNECOLOGY | Admitting: OBSTETRICS & GYNECOLOGY

## 2020-12-06 LAB
ALBUMIN SERPL-MCNC: 3.7 G/DL (ref 3.5–5.2)
ALBUMIN/GLOB SERPL: 1.2 G/DL
ALP SERPL-CCNC: 69 U/L (ref 39–117)
ALT SERPL W P-5'-P-CCNC: 6 U/L (ref 1–33)
ANION GAP SERPL CALCULATED.3IONS-SCNC: 12.6 MMOL/L (ref 5–15)
AST SERPL-CCNC: 16 U/L (ref 1–32)
BASOPHILS # BLD AUTO: 0.07 10*3/MM3 (ref 0–0.2)
BASOPHILS NFR BLD AUTO: 0.5 % (ref 0–1.5)
BILIRUB SERPL-MCNC: 0.3 MG/DL (ref 0–1.2)
BILIRUB UR QL STRIP: NEGATIVE
BUN SERPL-MCNC: 6 MG/DL (ref 6–20)
BUN/CREAT SERPL: 11.8 (ref 7–25)
CALCIUM SPEC-SCNC: 8.5 MG/DL (ref 8.6–10.5)
CHLORIDE SERPL-SCNC: 104 MMOL/L (ref 98–107)
CLARITY UR: ABNORMAL
CO2 SERPL-SCNC: 21.4 MMOL/L (ref 22–29)
COLOR UR: YELLOW
CREAT SERPL-MCNC: 0.51 MG/DL (ref 0.57–1)
DEPRECATED RDW RBC AUTO: 37.6 FL (ref 37–54)
EOSINOPHIL # BLD AUTO: 0.06 10*3/MM3 (ref 0–0.4)
EOSINOPHIL NFR BLD AUTO: 0.4 % (ref 0.3–6.2)
ERYTHROCYTE [DISTWIDTH] IN BLOOD BY AUTOMATED COUNT: 11.8 % (ref 12.3–15.4)
GFR SERPL CREATININE-BSD FRML MDRD: >150 ML/MIN/1.73
GLOBULIN UR ELPH-MCNC: 3 GM/DL
GLUCOSE SERPL-MCNC: 85 MG/DL (ref 65–99)
GLUCOSE UR STRIP-MCNC: NEGATIVE MG/DL
HCT VFR BLD AUTO: 30.9 % (ref 34–46.6)
HGB BLD-MCNC: 10.1 G/DL (ref 12–15.9)
HGB UR QL STRIP.AUTO: ABNORMAL
IMM GRANULOCYTES # BLD AUTO: 0.1 10*3/MM3 (ref 0–0.05)
IMM GRANULOCYTES NFR BLD AUTO: 0.7 % (ref 0–0.5)
KETONES UR QL STRIP: ABNORMAL
LEUKOCYTE ESTERASE UR QL STRIP.AUTO: ABNORMAL
LYMPHOCYTES # BLD AUTO: 1.44 10*3/MM3 (ref 0.7–3.1)
LYMPHOCYTES NFR BLD AUTO: 9.4 % (ref 19.6–45.3)
MCH RBC QN AUTO: 28.5 PG (ref 26.6–33)
MCHC RBC AUTO-ENTMCNC: 32.7 G/DL (ref 31.5–35.7)
MCV RBC AUTO: 87 FL (ref 79–97)
MONOCYTES # BLD AUTO: 1.39 10*3/MM3 (ref 0.1–0.9)
MONOCYTES NFR BLD AUTO: 9.1 % (ref 5–12)
NEUTROPHILS NFR BLD AUTO: 12.19 10*3/MM3 (ref 1.7–7)
NEUTROPHILS NFR BLD AUTO: 79.9 % (ref 42.7–76)
NITRITE UR QL STRIP: POSITIVE
NRBC BLD AUTO-RTO: 0 /100 WBC (ref 0–0.2)
PH UR STRIP.AUTO: 7 [PH] (ref 5–8)
PLATELET # BLD AUTO: 303 10*3/MM3 (ref 140–450)
PMV BLD AUTO: 10.5 FL (ref 6–12)
POTASSIUM SERPL-SCNC: 3.4 MMOL/L (ref 3.5–5.2)
PROT SERPL-MCNC: 6.7 G/DL (ref 6–8.5)
PROT UR QL STRIP: ABNORMAL
RBC # BLD AUTO: 3.55 10*6/MM3 (ref 3.77–5.28)
SODIUM SERPL-SCNC: 138 MMOL/L (ref 136–145)
SP GR UR STRIP: 1.02 (ref 1–1.03)
UROBILINOGEN UR QL STRIP: ABNORMAL
WBC # BLD AUTO: 15.25 10*3/MM3 (ref 3.4–10.8)

## 2020-12-06 PROCEDURE — 87186 SC STD MICRODIL/AGAR DIL: CPT | Performed by: OBSTETRICS & GYNECOLOGY

## 2020-12-06 PROCEDURE — 87077 CULTURE AEROBIC IDENTIFY: CPT | Performed by: OBSTETRICS & GYNECOLOGY

## 2020-12-06 PROCEDURE — 85025 COMPLETE CBC W/AUTO DIFF WBC: CPT | Performed by: OBSTETRICS & GYNECOLOGY

## 2020-12-06 PROCEDURE — 81001 URINALYSIS AUTO W/SCOPE: CPT | Performed by: OBSTETRICS & GYNECOLOGY

## 2020-12-06 PROCEDURE — 87086 URINE CULTURE/COLONY COUNT: CPT | Performed by: OBSTETRICS & GYNECOLOGY

## 2020-12-06 PROCEDURE — 99284 EMERGENCY DEPT VISIT MOD MDM: CPT | Performed by: OBSTETRICS & GYNECOLOGY

## 2020-12-06 PROCEDURE — 59025 FETAL NON-STRESS TEST: CPT

## 2020-12-06 PROCEDURE — 80053 COMPREHEN METABOLIC PANEL: CPT | Performed by: OBSTETRICS & GYNECOLOGY

## 2020-12-07 ENCOUNTER — APPOINTMENT (OUTPATIENT)
Dept: ULTRASOUND IMAGING | Facility: HOSPITAL | Age: 20
End: 2020-12-07

## 2020-12-07 PROBLEM — O23.02 PYELONEPHRITIS AFFECTING PREGNANCY IN SECOND TRIMESTER: Status: ACTIVE | Noted: 2020-12-07

## 2020-12-07 LAB
B PARAPERT DNA SPEC QL NAA+PROBE: NOT DETECTED
B PERT DNA SPEC QL NAA+PROBE: NOT DETECTED
BACTERIA UR QL AUTO: ABNORMAL /HPF
BASOPHILS # BLD AUTO: 0.08 10*3/MM3 (ref 0–0.2)
BASOPHILS NFR BLD AUTO: 0.6 % (ref 0–1.5)
C PNEUM DNA NPH QL NAA+NON-PROBE: NOT DETECTED
DEPRECATED RDW RBC AUTO: 37.1 FL (ref 37–54)
EOSINOPHIL # BLD AUTO: 0.02 10*3/MM3 (ref 0–0.4)
EOSINOPHIL NFR BLD AUTO: 0.1 % (ref 0.3–6.2)
ERYTHROCYTE [DISTWIDTH] IN BLOOD BY AUTOMATED COUNT: 11.7 % (ref 12.3–15.4)
FLUAV SUBTYP SPEC NAA+PROBE: NOT DETECTED
FLUBV RNA ISLT QL NAA+PROBE: NOT DETECTED
HADV DNA SPEC NAA+PROBE: NOT DETECTED
HCOV 229E RNA SPEC QL NAA+PROBE: NOT DETECTED
HCOV HKU1 RNA SPEC QL NAA+PROBE: NOT DETECTED
HCOV NL63 RNA SPEC QL NAA+PROBE: NOT DETECTED
HCOV OC43 RNA SPEC QL NAA+PROBE: NOT DETECTED
HCT VFR BLD AUTO: 27.9 % (ref 34–46.6)
HGB BLD-MCNC: 9.1 G/DL (ref 12–15.9)
HMPV RNA NPH QL NAA+NON-PROBE: NOT DETECTED
HPIV1 RNA SPEC QL NAA+PROBE: NOT DETECTED
HPIV2 RNA SPEC QL NAA+PROBE: NOT DETECTED
HPIV3 RNA NPH QL NAA+PROBE: NOT DETECTED
HPIV4 P GENE NPH QL NAA+PROBE: NOT DETECTED
HYALINE CASTS UR QL AUTO: ABNORMAL /LPF
IMM GRANULOCYTES # BLD AUTO: 0.11 10*3/MM3 (ref 0–0.05)
IMM GRANULOCYTES NFR BLD AUTO: 0.8 % (ref 0–0.5)
LYMPHOCYTES # BLD AUTO: 1.1 10*3/MM3 (ref 0.7–3.1)
LYMPHOCYTES NFR BLD AUTO: 7.6 % (ref 19.6–45.3)
M PNEUMO IGG SER IA-ACNC: NOT DETECTED
MCH RBC QN AUTO: 28.3 PG (ref 26.6–33)
MCHC RBC AUTO-ENTMCNC: 32.6 G/DL (ref 31.5–35.7)
MCV RBC AUTO: 86.6 FL (ref 79–97)
MONOCYTES # BLD AUTO: 1.28 10*3/MM3 (ref 0.1–0.9)
MONOCYTES NFR BLD AUTO: 8.9 % (ref 5–12)
NEUTROPHILS NFR BLD AUTO: 11.83 10*3/MM3 (ref 1.7–7)
NEUTROPHILS NFR BLD AUTO: 82 % (ref 42.7–76)
NRBC BLD AUTO-RTO: 0 /100 WBC (ref 0–0.2)
PLATELET # BLD AUTO: 297 10*3/MM3 (ref 140–450)
PMV BLD AUTO: 10.6 FL (ref 6–12)
RBC # BLD AUTO: 3.22 10*6/MM3 (ref 3.77–5.28)
RBC # UR: ABNORMAL /HPF
REF LAB TEST METHOD: ABNORMAL
RHINOVIRUS RNA SPEC NAA+PROBE: NOT DETECTED
RSV RNA NPH QL NAA+NON-PROBE: NOT DETECTED
SARS-COV-2 RNA NPH QL NAA+NON-PROBE: NOT DETECTED
SQUAMOUS #/AREA URNS HPF: ABNORMAL /HPF
WBC # BLD AUTO: 14.42 10*3/MM3 (ref 3.4–10.8)
WBC UR QL AUTO: ABNORMAL /HPF

## 2020-12-07 PROCEDURE — 76811 OB US DETAILED SNGL FETUS: CPT | Performed by: OBSTETRICS & GYNECOLOGY

## 2020-12-07 PROCEDURE — 76811 OB US DETAILED SNGL FETUS: CPT

## 2020-12-07 PROCEDURE — 59025 FETAL NON-STRESS TEST: CPT

## 2020-12-07 PROCEDURE — 85025 COMPLETE CBC W/AUTO DIFF WBC: CPT | Performed by: OBSTETRICS & GYNECOLOGY

## 2020-12-07 PROCEDURE — 0202U NFCT DS 22 TRGT SARS-COV-2: CPT | Performed by: OBSTETRICS & GYNECOLOGY

## 2020-12-07 PROCEDURE — 99222 1ST HOSP IP/OBS MODERATE 55: CPT | Performed by: OBSTETRICS & GYNECOLOGY

## 2020-12-07 PROCEDURE — 59025 FETAL NON-STRESS TEST: CPT | Performed by: OBSTETRICS & GYNECOLOGY

## 2020-12-07 PROCEDURE — 25010000002 PIPERACILLIN SOD-TAZOBACTAM PER 1 G: Performed by: OBSTETRICS & GYNECOLOGY

## 2020-12-07 PROCEDURE — 76775 US EXAM ABDO BACK WALL LIM: CPT

## 2020-12-07 RX ORDER — SODIUM CHLORIDE 0.9 % (FLUSH) 0.9 %
10 SYRINGE (ML) INJECTION EVERY 12 HOURS SCHEDULED
Status: DISCONTINUED | OUTPATIENT
Start: 2020-12-07 | End: 2020-12-09 | Stop reason: HOSPADM

## 2020-12-07 RX ORDER — DIPHENHYDRAMINE HCL 25 MG
25 CAPSULE ORAL NIGHTLY PRN
Status: CANCELLED | OUTPATIENT
Start: 2020-12-07

## 2020-12-07 RX ORDER — SODIUM CHLORIDE, SODIUM LACTATE, POTASSIUM CHLORIDE, CALCIUM CHLORIDE 600; 310; 30; 20 MG/100ML; MG/100ML; MG/100ML; MG/100ML
125 INJECTION, SOLUTION INTRAVENOUS CONTINUOUS
Status: CANCELLED | OUTPATIENT
Start: 2020-12-07

## 2020-12-07 RX ORDER — ACETAMINOPHEN 650 MG/1
650 SUPPOSITORY RECTAL EVERY 4 HOURS PRN
Status: CANCELLED | OUTPATIENT
Start: 2020-12-07

## 2020-12-07 RX ORDER — SODIUM CHLORIDE, SODIUM LACTATE, POTASSIUM CHLORIDE, CALCIUM CHLORIDE 600; 310; 30; 20 MG/100ML; MG/100ML; MG/100ML; MG/100ML
75 INJECTION, SOLUTION INTRAVENOUS CONTINUOUS
Status: DISCONTINUED | OUTPATIENT
Start: 2020-12-07 | End: 2020-12-09 | Stop reason: HOSPADM

## 2020-12-07 RX ORDER — SODIUM CHLORIDE 0.9 % (FLUSH) 0.9 %
10 SYRINGE (ML) INJECTION AS NEEDED
Status: DISCONTINUED | OUTPATIENT
Start: 2020-12-07 | End: 2020-12-09 | Stop reason: HOSPADM

## 2020-12-07 RX ORDER — ACETAMINOPHEN 325 MG/1
650 TABLET ORAL EVERY 4 HOURS PRN
Status: CANCELLED | OUTPATIENT
Start: 2020-12-07

## 2020-12-07 RX ORDER — PRENATAL VIT/IRON FUM/FOLIC AC 27MG-0.8MG
1 TABLET ORAL DAILY
Status: DISCONTINUED | OUTPATIENT
Start: 2020-12-07 | End: 2020-12-08

## 2020-12-07 RX ORDER — DIPHENHYDRAMINE HYDROCHLORIDE 50 MG/ML
25 INJECTION INTRAMUSCULAR; INTRAVENOUS NIGHTLY PRN
Status: CANCELLED | OUTPATIENT
Start: 2020-12-07

## 2020-12-07 RX ORDER — ONDANSETRON 4 MG/1
4 TABLET, FILM COATED ORAL EVERY 8 HOURS PRN
Status: CANCELLED | OUTPATIENT
Start: 2020-12-07

## 2020-12-07 RX ORDER — PHENAZOPYRIDINE HYDROCHLORIDE 200 MG/1
200 TABLET, FILM COATED ORAL
Status: DISPENSED | OUTPATIENT
Start: 2020-12-07 | End: 2020-12-09

## 2020-12-07 RX ORDER — ACETAMINOPHEN 325 MG/1
650 TABLET ORAL EVERY 4 HOURS PRN
Status: DISCONTINUED | OUTPATIENT
Start: 2020-12-07 | End: 2020-12-09 | Stop reason: HOSPADM

## 2020-12-07 RX ORDER — ONDANSETRON 2 MG/ML
4 INJECTION INTRAMUSCULAR; INTRAVENOUS EVERY 8 HOURS PRN
Status: CANCELLED | OUTPATIENT
Start: 2020-12-07

## 2020-12-07 RX ORDER — SODIUM CHLORIDE 0.9 % (FLUSH) 0.9 %
10 SYRINGE (ML) INJECTION AS NEEDED
Status: CANCELLED | OUTPATIENT
Start: 2020-12-07

## 2020-12-07 RX ORDER — LIDOCAINE HYDROCHLORIDE 10 MG/ML
5 INJECTION, SOLUTION EPIDURAL; INFILTRATION; INTRACAUDAL; PERINEURAL AS NEEDED
Status: CANCELLED | OUTPATIENT
Start: 2020-12-07

## 2020-12-07 RX ORDER — SODIUM CHLORIDE 0.9 % (FLUSH) 0.9 %
3 SYRINGE (ML) INJECTION EVERY 12 HOURS SCHEDULED
Status: CANCELLED | OUTPATIENT
Start: 2020-12-07

## 2020-12-07 RX ADMIN — PHENAZOPYRIDINE 200 MG: 200 TABLET ORAL at 12:33

## 2020-12-07 RX ADMIN — ACETAMINOPHEN 650 MG: 325 TABLET, FILM COATED ORAL at 22:48

## 2020-12-07 RX ADMIN — TAZOBACTAM SODIUM AND PIPERACILLIN SODIUM 3.38 G: 375; 3 INJECTION, SOLUTION INTRAVENOUS at 16:18

## 2020-12-07 RX ADMIN — TAZOBACTAM SODIUM AND PIPERACILLIN SODIUM 3.38 G: 375; 3 INJECTION, SOLUTION INTRAVENOUS at 01:07

## 2020-12-07 RX ADMIN — PHENAZOPYRIDINE 200 MG: 200 TABLET ORAL at 19:02

## 2020-12-07 RX ADMIN — TAZOBACTAM SODIUM AND PIPERACILLIN SODIUM 3.38 G: 375; 3 INJECTION, SOLUTION INTRAVENOUS at 08:13

## 2020-12-07 RX ADMIN — ACETAMINOPHEN 650 MG: 325 TABLET, FILM COATED ORAL at 03:57

## 2020-12-07 RX ADMIN — PHENAZOPYRIDINE 200 MG: 200 TABLET ORAL at 08:43

## 2020-12-07 RX ADMIN — SODIUM CHLORIDE, POTASSIUM CHLORIDE, SODIUM LACTATE AND CALCIUM CHLORIDE 75 ML/HR: 600; 310; 30; 20 INJECTION, SOLUTION INTRAVENOUS at 08:13

## 2020-12-07 NOTE — H&P
GEORGE History and Physical    Patient Name: Julian Fleming  YOB: 2000  MRN: [id number]  Admission Date: 2020 10:17 PM  Date of Service: 2020    Chief Complaint: Abdominal Pain (GEORGE- pt reports constant right-sided abd pain that started around 1400. Pt rating 8/10 and tylenol did not help pain. Pt tried heating pad and also did not help. Pt denies any problems in pregnancy. Denies LOF or VB. States FM normal. )        Subjective   Chief Complaint   Patient presents with   • Abdominal Pain     GEORGE- pt reports constant right-sided abd pain that started around 1400. Pt rating 8/10 and tylenol did not help pain. Pt tried heating pad and also did not help. Pt denies any problems in pregnancy. Denies LOF or VB. States FM normal.      Julian Fleming is a 20 y.o. year old  at 27w0d with an of Estimated Date of Delivery: 3/8/21, currently presenting with right CVA tenderness.    She sees Pat Crowell MD for her prenatal care.   Her pregnancy has been complicated by: UTI (e coli with significant resistance)  - subsequent negative culture but now with UTI (+ nitrites and Leuk. esterase) .  She has not been recently examined.  She describes fetal movement as normal.  She denies rupture of membranes.  She denies vaginal bleeding. She is not feeling contractions.      Patient Active Problem List    Diagnosis   • Pyelonephritis affecting pregnancy in second trimester [O23.02]   • Migraine [G43.909]     OB History    Para Term  AB Living   2 1 1 0 0 1   SAB TAB Ectopic Molar Multiple Live Births   0 0 0 0 0 1      # Outcome Date GA Lbr Frandy/2nd Weight Sex Delivery Anes PTL Lv   2 Current            1 Term 19 39w6d 05:59 / 04:21 3217 g (7 lb 1.5 oz) M Vag-Spont EPI N SHMUEL      Birth Comments: scale 4      Name: JULIO CESAR FLEMING      Apgar1: 8  Apgar5: 9     Past Medical History:   Diagnosis Date   • Anemia    • Chlamydia    • Migraine    • Urinary tract infection   "    History reviewed. No pertinent surgical history.    Current Facility-Administered Medications:   •  acetaminophen (TYLENOL) tablet 650 mg, 650 mg, Oral, Q4H PRN, Pat Crowell MD  •  phenazopyridine (PYRIDIUM) tablet 200 mg, 200 mg, Oral, TID With Meals, Joss Jain MD  •  prenatal vitamin tablet 1 tablet, 1 tablet, Oral, Daily, Pat Crowell MD  •  sodium chloride 0.9 % flush 10 mL, 10 mL, Intravenous, Q12H, Pat Crowell MD  •  sodium chloride 0.9 % flush 10 mL, 10 mL, Intravenous, PRN, Pat Crowell MD  Allergies   Allergen Reactions   • Cefzil [Cefprozil] Rash     Social History     Socioeconomic History   • Marital status: Single     Spouse name: Not on file   • Number of children: Not on file   • Years of education: Not on file   • Highest education level: Not on file   Tobacco Use   • Smoking status: Never Smoker   • Smokeless tobacco: Never Used   Substance and Sexual Activity   • Alcohol use: No     Comment: quit when finding out was pregnant   • Drug use: No   • Sexual activity: Yes     Partners: Male     Birth control/protection: None     Social History    Tobacco Use      Smoking status: Never Smoker      Smokeless tobacco: Never Used    Review of Systems      Objective   Vitals:    12/06/20 2228 12/06/20 2232 12/06/20 2235   BP:  116/67    Pulse:  100    Resp:   16   Temp:   98.5 °F (36.9 °C)   TempSrc:   Oral   SpO2:   98%   Weight: 52.7 kg (116 lb 3.2 oz)     Height:   154.9 cm (61\")       General: well developed; well nourished  appears to feel unwell   Heart: regular rate and rhythm   Lungs  Back: breathing is unlabored  CVA tenderness is right   Abdomen: soft, RIGHT CVA tenderness that is traceable along the length of the ureter to the suprapubic region; no masses  no umbilical or inguinal hernias are present  no hepato-splenomegaly  no rebound or guarding       Cervix: was not checked.      Contractions: none        Extremities: peripheral pulses normal, no pedal edema, no " clubbing or cyanosis      Prenatal Labs  Lab Results   Component Value Date    HGB 10.1 (L) 12/06/2020    RUBELLAABIGG 1.66 08/20/2020    HEPBSAG Negative 08/20/2020    ABO A 08/20/2020    RH Positive 08/20/2020    ABSCRN Negative 08/20/2020    [id number] Non Reactive 08/20/2020    HEPCVIRUSABY <0.1 08/20/2020    GCT 89 01/25/2019    STREPGPB Negative 03/29/2019    URINECX Final report 09/17/2020    CHLAMNAA Negative 09/17/2020    NGONORRHON Negative 09/17/2020       Current Labs Reviewed   Recent Results (from the past 24 hour(s))   Comprehensive Metabolic Panel    Collection Time: 12/06/20 10:56 PM    Specimen: Arm, Left; Blood   Result Value Ref Range    Glucose 85 65 - 99 mg/dL    BUN 6 6 - 20 mg/dL    Creatinine 0.51 (L) 0.57 - 1.00 mg/dL    Sodium 138 136 - 145 mmol/L    Potassium 3.4 (L) 3.5 - 5.2 mmol/L    Chloride 104 98 - 107 mmol/L    CO2 21.4 (L) 22.0 - 29.0 mmol/L    Calcium 8.5 (L) 8.6 - 10.5 mg/dL    Total Protein 6.7 6.0 - 8.5 g/dL    Albumin 3.70 3.50 - 5.20 g/dL    ALT (SGPT) 6 1 - 33 U/L    AST (SGOT) 16 1 - 32 U/L    Alkaline Phosphatase 69 39 - 117 U/L    Total Bilirubin 0.3 0.0 - 1.2 mg/dL    eGFR Non African Amer >150 >60 mL/min/1.73    Globulin 3.0 gm/dL    A/G Ratio 1.2 g/dL    BUN/Creatinine Ratio 11.8 7.0 - 25.0    Anion Gap 12.6 5.0 - 15.0 mmol/L   CBC Auto Differential    Collection Time: 12/06/20 10:56 PM    Specimen: Arm, Left; Blood   Result Value Ref Range    WBC 15.25 (H) 3.40 - 10.80 10*3/mm3    RBC 3.55 (L) 3.77 - 5.28 10*6/mm3    Hemoglobin 10.1 (L) 12.0 - 15.9 g/dL    Hematocrit 30.9 (L) 34.0 - 46.6 %    MCV 87.0 79.0 - 97.0 fL    MCH 28.5 26.6 - 33.0 pg    MCHC 32.7 31.5 - 35.7 g/dL    RDW 11.8 (L) 12.3 - 15.4 %    RDW-SD 37.6 37.0 - 54.0 fl    MPV 10.5 6.0 - 12.0 fL    Platelets 303 140 - 450 10*3/mm3    Neutrophil % 79.9 (H) 42.7 - 76.0 %    Lymphocyte % 9.4 (L) 19.6 - 45.3 %    Monocyte % 9.1 5.0 - 12.0 %    Eosinophil % 0.4 0.3 - 6.2 %    Basophil % 0.5 0.0 - 1.5 %     Immature Grans % 0.7 (H) 0.0 - 0.5 %    Neutrophils, Absolute 12.19 (H) 1.70 - 7.00 10*3/mm3    Lymphocytes, Absolute 1.44 0.70 - 3.10 10*3/mm3    Monocytes, Absolute 1.39 (H) 0.10 - 0.90 10*3/mm3    Eosinophils, Absolute 0.06 0.00 - 0.40 10*3/mm3    Basophils, Absolute 0.07 0.00 - 0.20 10*3/mm3    Immature Grans, Absolute 0.10 (H) 0.00 - 0.05 10*3/mm3    nRBC 0.0 0.0 - 0.2 /100 WBC   Urinalysis With Culture If Indicated - Urine, Clean Catch    Collection Time: 12/06/20 11:11 PM    Specimen: Urine, Clean Catch   Result Value Ref Range    Color, UA Yellow Yellow, Straw    Appearance, UA Turbid (A) Clear    pH, UA 7.0 5.0 - 8.0    Specific Gravity, UA 1.018 1.005 - 1.030    Glucose, UA Negative Negative    Ketones, UA 15 mg/dL (1+) (A) Negative    Bilirubin, UA Negative Negative    Blood, UA Moderate (2+) (A) Negative    Protein, UA >=300 mg/dL (3+) (A) Negative    Leuk Esterase, UA Moderate (2+) (A) Negative    Nitrite, UA Positive (A) Negative    Urobilinogen, UA 1.0 E.U./dL 0.2 - 1.0 E.U./dL   Urinalysis, Microscopic Only - Urine, Clean Catch    Collection Time: 12/06/20 11:11 PM    Specimen: Urine, Clean Catch   Result Value Ref Range    RBC, UA 6-12 (A) None Seen, 0-2 /HPF    WBC, UA Too Numerous to Count (A) None Seen, 0-2 /HPF    Bacteria, UA 4+ (A) None Seen /HPF    Squamous Epithelial Cells, UA 3-6 (A) None Seen, 0-2 /HPF    Hyaline Casts, UA 0-2 None Seen /LPF    Methodology Manual Light Microscopy             Assessment   1. IUP at 27w0d  2.   Reactive NST, CAT 1 tracing, Reassuring fetal status  3.   Complicated UTI vs early pyelonephritis   Hx of e coli UTI tis pregnancy that had significant antibiotic resistance   Chart states Cefzil allergy (rash) but patient has had cephalosporins    Urine culture 8/20 reviewed with Infectious disease on-call Dr as reference to expected pathogen for this UTI       Plan   1. Admit to antepartum  2. Given the patients physical findings and previous culture will admit for  IVF, pain control and IV antibiotics  1. After reviewing the previous culture as an expected pathogen of this UTI will start Erikan    Case reviewed with Dr Crowell who will see the patient in the morning    I have spent 45 minutes including face to face time with the patient, greater than 50% in discussion of the diagnosis (counseling) and/or coordination of care.         Joss Jain MD  12/7/2020  00:12 EST     Phone   [phone]

## 2020-12-07 NOTE — PLAN OF CARE
Problem: Adult Inpatient Plan of Care  Goal: Plan of Care Review  Outcome: Ongoing, Progressing  Flowsheets (Taken 12/7/2020 0643)  Progress: improving  Plan of Care Reviewed With: patient  Goal: Patient-Specific Goal (Individualized)  Outcome: Ongoing, Progressing  Flowsheets (Taken 12/7/2020 0643)  Patient-Specific Goals (Include Timeframe): None  Individualized Care Needs: None  Anxieties, Fears or Concerns: Patient has fear of needles and needing IV/labs.  Goal: Absence of Hospital-Acquired Illness or Injury  Outcome: Ongoing, Progressing  Intervention: Identify and Manage Fall Risk  Recent Flowsheet Documentation  Taken 12/7/2020 0115 by Tesha Romero RN  Safety Promotion/Fall Prevention:   safety round/check completed   nonskid shoes/slippers when out of bed   fall prevention program maintained  Intervention: Prevent Skin Injury  Recent Flowsheet Documentation  Taken 12/7/2020 0115 by Tesha Romero RN  Body Position: tilted, right  Goal: Optimal Comfort and Wellbeing  Outcome: Ongoing, Progressing  Intervention: Provide Person-Centered Care  Recent Flowsheet Documentation  Taken 12/7/2020 0115 by Tesha Romero RN  Trust Relationship/Rapport:   care explained   choices provided   empathic listening provided   emotional support provided   questions encouraged   thoughts/feelings acknowledged  Goal: Readiness for Transition of Care  Outcome: Ongoing, Progressing  Intervention: Mutually Develop Transition Plan  Recent Flowsheet Documentation  Taken 12/7/2020 0251 by Tesha Romero RN  Equipment Currently Used at Home: none  Taken 12/7/2020 0250 by Tesha Romero RN  Transportation Anticipated: family or friend will provide  Patient/Family Anticipated Services at Transition: none  Patient/Family Anticipates Transition to: home   Goal Outcome Evaluation:  Plan of Care Reviewed With: patient  Progress: improving

## 2020-12-07 NOTE — PLAN OF CARE
Problem: Adult Inpatient Plan of Care  Goal: Plan of Care Review  Outcome: Ongoing, Progressing  Flowsheets (Taken 12/7/2020 0643 by Tesha Romero RN)  Progress: improving  Plan of Care Reviewed With: patient  Goal: Patient-Specific Goal (Individualized)  Outcome: Ongoing, Progressing  Goal: Absence of Hospital-Acquired Illness or Injury  Outcome: Ongoing, Progressing  Intervention: Identify and Manage Fall Risk  Recent Flowsheet Documentation  Taken 12/7/2020 0848 by Brittany Hargrove RN  Safety Promotion/Fall Prevention: safety round/check completed  Goal: Optimal Comfort and Wellbeing  Outcome: Ongoing, Progressing  Intervention: Provide Person-Centered Care  Recent Flowsheet Documentation  Taken 12/7/2020 0848 by Brittany Hargrove RN  Trust Relationship/Rapport: care explained  Goal: Readiness for Transition of Care  Outcome: Ongoing, Progressing   Goal Outcome Evaluation:  Plan of Care Reviewed With: patient  Progress: improving

## 2020-12-07 NOTE — NON STRESS TEST
Julian Fleming, a  at 27w0d with an PILLO of 3/8/2021, by Ultrasound, was seen at [facility] for a nonstress test.    Chief Complaint   Patient presents with   • Abdominal Pain     GEORGE- pt reports constant right-sided abd pain that started around 1400. Pt rating 8/10 and tylenol did not help pain. Pt tried heating pad and also did not help. Pt denies any problems in pregnancy. Denies LOF or VB. States FM normal.        Patient Active Problem List   Diagnosis   • Migraine   • Pyelonephritis affecting pregnancy in second trimester       Start Time: 1145  Stop Time: 1230    Interpretation A  Nonstress Test Interpretation A: Reactive (20 1230 : Brittany Hargrove RN)

## 2020-12-07 NOTE — H&P
[facility]  Obstetric History and Physical    Chief Complaint   Patient presents with   • Abdominal Pain     GEORGE- pt reports constant right-sided abd pain that started around 1400. Pt rating 8/10 and tylenol did not help pain. Pt tried heating pad and also did not help. Pt denies any problems in pregnancy. Denies LOF or VB. States FM normal.        Subjective     Patient is a 20 y.o. female  currently at 27w0d, who presents with right sided pain that started yesterday at 2-3 PM.  She reports the pain was mostly along her right side and in her back.  No fever, chills, dysuria.  No vaginal bleeding/LOF/contractions. Notes normal fetal movements.    This pregnancy has been complicated by asymptomatic bacteruria this pregnancy with negative MICHELE.  Her previous obstetric/gynecological history is notable for one prior , admitted with right sided pyelonephritis.       Prenatal Information:  Prenatal Results     POC Urine Glucose/Protein     Test Value Reference Range Date Time    Urine Glucose Negative  Negative, 1000 mg/dL (3+) 20     Urine Protein Negative  Negative 20           Initial Prenatal Labs     Test Value Reference Range Date Time    Hemoglobin 11.6 g/dL 11.1 - 15.9 20 1039    Hematocrit 35.1 % 34.0 - 46.6 20 1039    Platelets 297 10*3/mm3 140 - 450 20 0607      303 10*3/mm3 140 - 450 20 2256      294 x10E3/uL 150 - 450 20 1039    Rubella IgG 1.66 index Immune >0.99 20 1039    Hepatitis B SAg Negative  Negative 20 1039    Hepatitis C Ab <0.1 s/co ratio 0.0 - 0.9 20 1039    RPR Non Reactive  Non Reactive 20 1039    ABO A   20 1039    Rh Positive   20 1039    Antibody Screen Negative  Negative 20 1039    HIV Non Reactive  Non Reactive 20 1039    Urine Culture Final report   20 1253      Final report   20 1254    Gonorrhea Negative  Negative 20 1253      Negative  Negative 20 1255     Chlamydia Negative  Negative 09/17/20 1253      Negative  Negative 08/20/20 1255    TSH              2nd and 3rd Trimester     Test Value Reference Range Date Time    Hemoglobin (repeated) 9.1 g/dL 12.0 - 15.9 12/07/20 0607      10.1 g/dL 12.0 - 15.9 12/06/20 2256    Hematocrit (repeated) 27.9 % 34.0 - 46.6 12/07/20 0607      30.9 % 34.0 - 46.6 12/06/20 2256    GCT        Antibody Screen (repeated)        GTT Fasting        GTT 1 Hr        GTT 2 Hr        GTT 3 Hr        Group B Strep              Drug Screening     Test Value Reference Range Date Time    Amphetamine Screen Negative ng/mL Fbdgku=6466 08/20/20 1254    Barbiturate Screen Negative ng/mL Gaaqsk=935 08/20/20 1254    Benzodiazepine Screen Negative ng/mL Lgukge=548 08/20/20 1254    Methadone Screen Negative ng/mL Ctcmpe=967 08/20/20 1254    Phencyclidine Screen Negative ng/mL Cutoff=25 08/20/20 1254    Opiates Screen Negative ng/mL Qzhpmg=106 08/20/20 1254    THC Screen Negative ng/mL Cutoff=50 08/20/20 1254    Cocaine Screen Negative ng/mL Nlgzbz=339 08/20/20 1254    Propoxyphene Screen Negative ng/mL Nmmupj=143 08/20/20 1254    Buprenorphine Screen        Methamphetamine Screen        Oxycodone Screen        Tricyclic Antidepressants Screen              Other (Risk screening)     Test Value Reference Range Date Time    Varicella IgG        Parvovirus IgG        CMV IgG        Cystic Fibrosis        Hemoglobin electrophoresis        NIPT        MSAFP-4        AFP (for NTD only)                  External Prenatal Results     Pregnancy Outside Results - Transcribed From Office Records - See Scanned Records For Details     Test Value Date Time    Hgb 9.1 g/dL 12/07/20 0607      10.1 g/dL 12/06/20 2256      11.6 g/dL 08/20/20 1039    Hct 27.9 % 12/07/20 0607      30.9 % 12/06/20 2256      35.1 % 08/20/20 1039    ABO A  08/20/20 1039    Rh Positive  08/20/20 1039    Antibody Screen Negative  08/20/20 1039    Glucose Fasting GTT       Glucose Tolerance Test 1  hour       Glucose Tolerance Test 3 hour       Gonorrhea (discrete) Negative  20 1253      Negative  20 1255    Chlamydia (discrete) Negative  20 1253      Negative  20 1255    RPR Non Reactive  20 1039    VDRL       Syphilis Antibody       Rubella 1.66 index 20 1039    HBsAg Negative  20 1039    Herpes Simplex Virus PCR       Herpes Simplex VIrus Culture       HIV Non Reactive  20 1039    Hep C RNA Quant PCR       Hep C Antibody <0.1 s/co ratio 20 1039    AFP       Group B Strep Negative  19 1154    GBS Susceptibility to Clindamycin       GBS Susceptibility to Erythromycin       Fetal Fibronectin       Genetic Testing, Maternal Blood             Drug Screening     Test Value Date Time    Urine Drug Screen       Amphetamine Screen Negative ng/mL 20 1254    Barbiturate Screen Negative ng/mL 20 1254    Benzodiazepine Screen Negative ng/mL 20 1254    Methadone Screen Negative ng/mL 20 1254    Phencyclidine Screen Negative ng/mL 20 1254    Opiates Screen Negative  19 2159    THC Screen Negative  19 215    Cocaine Screen       Propoxyphene Screen Negative ng/mL 20 1254    Buprenorphine Screen       Methamphetamine Screen       Oxycodone Screen Negative  19 2159    Tricyclic Antidepressants Screen                    Past OB History:     OB History    Para Term  AB Living   2 1 1 0 0 1   SAB TAB Ectopic Molar Multiple Live Births   0 0 0 0 0 1      # Outcome Date GA Lbr Frandy/2nd Weight Sex Delivery Anes PTL Lv   2 Current            1 Term 19 39w6d 05:59 / 04:21 3217 g (7 lb 1.5 oz) M Vag-Spont EPI N SHMUEL      Birth Comments: scale 4      Name: JULIO CESAR BARBOSA      Apgar1: 8  Apgar5: 9       Past Medical History: Past Medical History:   Diagnosis Date   • Anemia    • Chlamydia    • Migraine    • Urinary tract infection       Past Surgical History History reviewed. No pertinent surgical  history.   Family History: Family History   Problem Relation Age of Onset   • Breast cancer Neg Hx    • Ovarian cancer Neg Hx    • Uterine cancer Neg Hx    • Colon cancer Neg Hx    • Deep vein thrombosis Neg Hx    • Pulmonary embolism Neg Hx       Social History:  reports that she has never smoked. She has never used smokeless tobacco.   reports no history of alcohol use.   reports no history of drug use.         Review of Systems - Negative except per HPI for 10 point review of systems including General, Psychological, Ophthalmic, ENT, Endocrine, Respiratory, Cardiovascular, Gastrointestinal, Genito-Urinary, Musculoskeletal, Neurological, Dermatological      Objective       Vital Signs Range for the last 24 hours  Temperature: Temp:  [98.3 °F (36.8 °C)-99.9 °F (37.7 °C)] 98.3 °F (36.8 °C)   Temp Source: Temp src: Oral   BP: BP: (111-118)/(45-73) 111/57   Pulse: Heart Rate:  [] 87   Respirations: Resp:  [16-20] 16   SPO2: SpO2:  [98 %] 98 %   O2 Amount (l/min):     O2 Devices     Weight: Weight:  [52.7 kg (116 lb 3.2 oz)] 52.7 kg (116 lb 3.2 oz)     Physical Examination: General appearance - alert, well appearing, and in no distress  Mental status - alert, oriented to person, place, and time  Eyes - sclera anicteric, left eye normal, right eye normal  Chest - no tachypnea, retractions or cyanosis  Heart - normal rate and regular rhythm  Abdomen - soft, nontender, gravid  Pelvic - deferred  Back exam - full range of motion, no tenderness, palpable spasm or pain on motion, no CVA tenderness bilaterally  Neurological - alert, oriented, normal speech, no focal findings or movement disorder noted   Musculoskeletal - no joint tenderness, deformity or swelling  Extremities - pedal edema   Skin - normal coloration and turgor, no rashes, no suspicious skin lesions noted        Cervix: Exam by:     Dilation:     Effacement:     Station:         Fetal Heart Rate Assessment   Method: Fetal HR Assessment Method: external    Beats/min: Fetal HR (beats/min): 140   Baseline: Fetal Heart Baseline Rate: normal range   Varibility: Fetal HR Variability: moderate (amplitude range 6 to 25 bpm)   Accels: Fetal HR Accelerations: greater than/equal to 15 bpm, lasting at least 15 seconds   Decels: Fetal HR Decelerations: absent   Tracing Category:       Uterine Assessment   Method: Method: external tocotransducer, palpation   Frequency (min):     Ctx Count in 10 min:     Duration:     Intensity: Contraction Intensity: no contractions   Intensity by IUPC:     Resting Tone: Uterine Resting Tone: soft by palpation   Resting Tone by IUPC:     Joseph Units:       Lab Results   Component Value Date    WBC 14.42 (H) 12/07/2020    HGB 9.1 (L) 12/07/2020    HCT 27.9 (L) 12/07/2020    MCV 86.6 12/07/2020     12/07/2020      US: 10/14/2020 - breech    Assessment/Plan   Assessment:  1.  Intrauterine pregnancy at 27w0d weeks gestation with reassuring fetal status.    2.  Pyelonephritis    Plan:  1. Pyelonephritis:   Afebrile this admission   WBC improved to 14.42 from 15.25 yesterday and CVA tenderness much improved.   Continue Zosyn based on culture from Aug 2020 until Culture and sensitivities result   Will get renal ultrasound as this is her second episode of pyelonephritis (first was last pregnancy).      2. Fetal status: reassuring, growth US today   NST q shift    Plan of care has been reviewed with patient.  Risks, benefits of treatment plan have been discussed.  All questions have been answered.  Plan for inpatient management until able to transition to PO.        Pat Crowell MD  12/7/2020  09:44 EST

## 2020-12-08 PROCEDURE — 59025 FETAL NON-STRESS TEST: CPT

## 2020-12-08 PROCEDURE — 59025 FETAL NON-STRESS TEST: CPT | Performed by: OBSTETRICS & GYNECOLOGY

## 2020-12-08 PROCEDURE — 25010000002 PIPERACILLIN SOD-TAZOBACTAM PER 1 G: Performed by: OBSTETRICS & GYNECOLOGY

## 2020-12-08 PROCEDURE — 99232 SBSQ HOSP IP/OBS MODERATE 35: CPT | Performed by: OBSTETRICS & GYNECOLOGY

## 2020-12-08 PROCEDURE — 59025 FETAL NON-STRESS TEST: CPT | Performed by: STUDENT IN AN ORGANIZED HEALTH CARE EDUCATION/TRAINING PROGRAM

## 2020-12-08 RX ADMIN — SODIUM CHLORIDE, POTASSIUM CHLORIDE, SODIUM LACTATE AND CALCIUM CHLORIDE 75 ML/HR: 600; 310; 30; 20 INJECTION, SOLUTION INTRAVENOUS at 09:44

## 2020-12-08 RX ADMIN — TAZOBACTAM SODIUM AND PIPERACILLIN SODIUM 3.38 G: 375; 3 INJECTION, SOLUTION INTRAVENOUS at 15:28

## 2020-12-08 RX ADMIN — Medication 1 EACH: at 09:03

## 2020-12-08 RX ADMIN — PHENAZOPYRIDINE 200 MG: 200 TABLET ORAL at 08:17

## 2020-12-08 RX ADMIN — TAZOBACTAM SODIUM AND PIPERACILLIN SODIUM 3.38 G: 375; 3 INJECTION, SOLUTION INTRAVENOUS at 00:12

## 2020-12-08 RX ADMIN — TAZOBACTAM SODIUM AND PIPERACILLIN SODIUM 3.38 G: 375; 3 INJECTION, SOLUTION INTRAVENOUS at 08:17

## 2020-12-08 RX ADMIN — SODIUM CHLORIDE, PRESERVATIVE FREE 10 ML: 5 INJECTION INTRAVENOUS at 08:17

## 2020-12-08 NOTE — PLAN OF CARE
Problem: Adult Inpatient Plan of Care  Goal: Plan of Care Review  Outcome: Ongoing, Progressing  Flowsheets  Taken 12/8/2020 1710 by Leny Nieves, RN  Outcome Summary: NST reactive, continue to strain urine. Patient currently on pyridium. Patient denies taking any tylenol for pain. lr at 75 cc/hr and zosyn every 8 hours.  Taken 12/8/2020 0637 by Ignacio Kirby, RN  Progress: improving  Plan of Care Reviewed With: patient   Goal Outcome Evaluation:  Plan of Care Reviewed With: patient  Progress: improving  Outcome Summary: NST reactive, continue to strain urine. Patient currently on pyridium. Patient denies taking any tylenol for pain. lr at 75 cc/hr and zosyn every 8 hours.

## 2020-12-08 NOTE — NON STRESS TEST
Julian Fleming, a  at 27w1d with an PILLO of 3/8/2021, by Ultrasound, was seen at [facility]TEPART[facility] UN[facility] for a nonstress test.    Chief Complaint   Patient presents with   • Abdominal Pain     GEORGE- pt reports constant right-sided abd pain that started around 1400. Pt rating 8/10 and tylenol did not help pain. Pt tried heating pad and also did not help. Pt denies any problems in pregnancy. Denies LOF or VB. States FM normal.        Patient Active Problem List   Diagnosis   • Migraine   • Pyelonephritis affecting pregnancy in second trimester       Start Time:   Stop Time:     Interpretation A  Nonstress Test Interpretation A: Reactive (20 : Ignacio Kirby, RN)  Comments A: [id number] (20 : Ignacio Kirby, RN)        Pt. Doing well. NST reactive for GA of 27 weeks.

## 2020-12-08 NOTE — NON STRESS TEST
Julian Fleming, a  at 27w1d with an PILLO of 3/8/2021, by Ultrasound, was seen at [facility]TEPART[facility] UN[facility] for a nonstress test.    Chief Complaint   Patient presents with   • Abdominal Pain     GEORGE- pt reports constant right-sided abd pain that started around 1400. Pt rating 8/10 and tylenol did not help pain. Pt tried heating pad and also did not help. Pt denies any problems in pregnancy. Denies LOF or VB. States FM normal.        Patient Active Problem List   Diagnosis   • Migraine   • Pyelonephritis affecting pregnancy in second trimester       Start Time: 905  Stop Time: 941    Interpretation A  Nonstress Test Interpretation A: Reactive (20 : Leny Nieves RN)

## 2020-12-08 NOTE — PROGRESS NOTES
LOS: 1 day   Patient Care Team:  Provider, No Known as PCP - General    Chief Complaint:  Pyelonephritis    Subjective     Interval History:     Patient doing well this morning.  She has no complaints.  She denies flank pain, fevers, or chills.  She reports good fetal movement.    Review of Systems:    The following systems were reviewed and negative;  constitution, respiratory, cardiovascular, gastrointestinal, genitourinary, integument and musculoskeletal    Objective     Vital Signs  Temp:  [98 °F (36.7 °C)-99.5 °F (37.5 °C)] 98.2 °F (36.8 °C)  Heart Rate:  [48-98] 52  Resp:  [16-18] 18  BP: ()/(52-60) 97/57    Physical Exam:   General Appearance: alert, appears stated age and cooperative  Back: no CVA tenderness  Lungs: clear to auscultation, respirations regular, respirations even and respirations unlabored  Heart: regular rhythm & normal rate  Abdomen: soft non-tender, no guarding, no rebound tenderness and gravid uterus c/w dates  Extremities: moves extremities well, no edema, no cyanosis and no redness  Skin: no bleeding, bruising or rash     Results Review:     I reviewed the patient's new clinical results.      Assessment/Plan       Pyelonephritis affecting pregnancy in second trimester      19 yo  at 27w1d with pyelonephritis    1. Pyelonephritis--urine culture still pending.  Will continue zosyn until final culture and susceptibilities returns.  Will discharge home tomorrow on oral antibiotics.  She has clinically improved since admission.    2. Fetal well being--continue with bid NSTs. Fetal ultrasound from yesterday is pending.      Ana Lee MD  20  11:06 EST      Time: More than 50% of time spent in counseling and coordination of care:  Total face-to-face/floor time 25 min.  Time spent in counseling 15 min. Counseling included the following topics: plan of care

## 2020-12-08 NOTE — PLAN OF CARE
Goal Outcome Evaluation:  Plan of Care Reviewed With: patient  Progress: improving  Outcome Summary: NST reactive for 27 weeks. Continue to strain urine for stone to pass. Pt. only reports mild pain that she took Tylenol for. LR at 75 and Zosyn q 8.

## 2020-12-09 ENCOUNTER — HOSPITAL ENCOUNTER (OUTPATIENT)
Facility: HOSPITAL | Age: 20
End: 2020-12-09
Attending: OBSTETRICS & GYNECOLOGY | Admitting: OBSTETRICS & GYNECOLOGY

## 2020-12-09 VITALS
SYSTOLIC BLOOD PRESSURE: 105 MMHG | OXYGEN SATURATION: 98 % | HEIGHT: 61 IN | BODY MASS INDEX: 21.94 KG/M2 | RESPIRATION RATE: 18 BRPM | WEIGHT: 116.2 LBS | TEMPERATURE: 98.1 F | HEART RATE: 62 BPM | DIASTOLIC BLOOD PRESSURE: 62 MMHG

## 2020-12-09 LAB — BACTERIA SPEC AEROBE CULT: ABNORMAL

## 2020-12-09 PROCEDURE — 59025 FETAL NON-STRESS TEST: CPT

## 2020-12-09 PROCEDURE — 99238 HOSP IP/OBS DSCHRG MGMT 30/<: CPT | Performed by: OBSTETRICS & GYNECOLOGY

## 2020-12-09 PROCEDURE — 59025 FETAL NON-STRESS TEST: CPT | Performed by: OBSTETRICS & GYNECOLOGY

## 2020-12-09 PROCEDURE — 25010000002 PIPERACILLIN SOD-TAZOBACTAM PER 1 G: Performed by: OBSTETRICS & GYNECOLOGY

## 2020-12-09 RX ORDER — NITROFURANTOIN 25; 75 MG/1; MG/1
100 CAPSULE ORAL EVERY 12 HOURS SCHEDULED
Status: DISCONTINUED | OUTPATIENT
Start: 2020-12-09 | End: 2020-12-09 | Stop reason: HOSPADM

## 2020-12-09 RX ORDER — NITROFURANTOIN 25; 75 MG/1; MG/1
100 CAPSULE ORAL 2 TIMES DAILY
Qty: 7 CAPSULE | Refills: 0 | Status: SHIPPED | OUTPATIENT
Start: 2020-12-09 | End: 2020-12-13

## 2020-12-09 RX ADMIN — TAZOBACTAM SODIUM AND PIPERACILLIN SODIUM 3.38 G: 375; 3 INJECTION, SOLUTION INTRAVENOUS at 00:14

## 2020-12-09 RX ADMIN — Medication 1 EACH: at 08:15

## 2020-12-09 RX ADMIN — SODIUM CHLORIDE, POTASSIUM CHLORIDE, SODIUM LACTATE AND CALCIUM CHLORIDE 75 ML/HR: 600; 310; 30; 20 INJECTION, SOLUTION INTRAVENOUS at 03:43

## 2020-12-09 RX ADMIN — SODIUM CHLORIDE, PRESERVATIVE FREE 10 ML: 5 INJECTION INTRAVENOUS at 03:46

## 2020-12-09 RX ADMIN — SODIUM CHLORIDE, PRESERVATIVE FREE 10 ML: 5 INJECTION INTRAVENOUS at 00:12

## 2020-12-09 RX ADMIN — SODIUM CHLORIDE, PRESERVATIVE FREE 10 ML: 5 INJECTION INTRAVENOUS at 08:22

## 2020-12-09 RX ADMIN — TAZOBACTAM SODIUM AND PIPERACILLIN SODIUM 3.38 G: 375; 3 INJECTION, SOLUTION INTRAVENOUS at 08:15

## 2020-12-09 NOTE — NON STRESS TEST
Julian Fleming, a  at 27w2d with an PILLO of 3/8/2021, by Ultrasound, was seen at [facility] ANTEPARTUM UNIT for a nonstress test.    Chief Complaint   Patient presents with   • Abdominal Pain     GEORGE- pt reports constant right-sided abd pain that started around 1400. Pt rating 8/10 and tylenol did not help pain. Pt tried heating pad and also did not help. Pt denies any problems in pregnancy. Denies LOF or VB. States FM normal.        Patient Active Problem List   Diagnosis   • Migraine   • Pyelonephritis affecting pregnancy in second trimester       Start Time: 930  Stop Time:     Interpretation A  Nonstress Test Interpretation A: Reactive (20 1034 : Leny Nieves RN)

## 2020-12-09 NOTE — PROGRESS NOTES
LOS: 2 days   Patient Care Team:  Provider, No Known as PCP - General    Chief Complaint: UTI    Subjective     History of Present Illness - Patient is a 20 year old  at 27 weeks admitted with work up positive for pyelonephritis.  Patient on IV antibiotics.    Subjective - Patient feels improve and symptoms have resolved.  She denies flank pain, blood in urine or dysuria.  She has active fetal movement.  No cramping.    History taken from: patient    Objective     Vital Signs  Temp:  [97.4 °F (36.3 °C)-99 °F (37.2 °C)] 98.1 °F (36.7 °C)  Heart Rate:  [54-85] 62  Resp:  [16-18] 18  BP: (100-114)/(60-69) 105/62    Objective    Results Review:             Urine Culture >100,000 CFU/mL Escherichia coli ESBLAbnormal     Consider infectious disease consult.  Susceptibility results may not correlate to clinical outcomes.      Resulting Agency: [facility]   Susceptibility     Escherichia coli ESBL     MELO     Ertapenem <=0.5 ug/ml Susceptible     Gentamicin <=1 ug/ml Susceptible     Levofloxacin >=8 ug/ml Resistant     Meropenem <=0.25 ug/ml Susceptible     Nitrofurantoin <=16 ug/ml Susceptible     Piperacillin + Tazobactam <=4 ug/ml Susceptible     Tetracycline <=1 ug/ml Susceptible     Trimethoprim + Sulfamethoxazole >=320 ug/ml Resistant                         NST - Category 1    Medication Review: Zosyn    Assessment/Plan       Pyelonephritis affecting pregnancy in second trimester      Assessment & Plan  IUP at 27 weeks with pyelonephritis  - Symptoms resolved and patient clinically improved.  Will switch to oral antibiotics  Cultures switch to Macrobid oral.  Patient to follow up in one weeks.  She should complete antibiotic course.  - Fetal status reassuring overall.    Douglas Rodriguez MD  20  11:18 EST

## 2020-12-09 NOTE — PLAN OF CARE
Problem: Adult Inpatient Plan of Care  Goal: Plan of Care Review  Outcome: Met  Flowsheets  Taken 12/9/2020 1200 by Leny Nieves RN  Progress: improving  Outcome Summary: Patient discharged home today  Taken 12/8/2020 0637 by Ignacio Kirby, RN  Plan of Care Reviewed With: patient   Goal Outcome Evaluation:  Plan of Care Reviewed With: patient  Progress: improving  Outcome Summary: Patient discharged home today

## 2020-12-09 NOTE — NON STRESS TEST
Julian Fleming, a  at 27w1d with an PILLO of 3/8/2021, by Ultrasound, was seen at [facility]TEPARTUM UN[facility] for a nonstress test.    Chief Complaint   Patient presents with   • Abdominal Pain     GEORGE- pt reports constant right-sided abd pain that started around 1400. Pt rating 8/10 and tylenol did not help pain. Pt tried heating pad and also did not help. Pt denies any problems in pregnancy. Denies LOF or VB. States FM normal.        Patient Active Problem List   Diagnosis   • Migraine   • Pyelonephritis affecting pregnancy in second trimester       Start Time:   Stop Time:     Interpretation A  Nonstress Test Interpretation A: Reactive (20 : Yady Sanabria, RN)

## 2020-12-09 NOTE — NURSING NOTE
Dr. Rodriguez on nursing unit. Discussed with Dr. Rodriguez that the patient's urine culture came back and she was positive for E.Coli. Dr. Rodriguez stated that the patient will go home on Macrobid. Discussed with Dr. Rodriguez that the patient was on the fetal monitor longer than usual because the baby was very active. Audible fetal movement noted and per patient. Prolonged accelerations noted  with moderate variability and no decelerations noted during monitoring. Patient's kick count was 10 movements in 5 minutes during monitoring. Dr. Rodriguez states patient may be discharged home today. r/v

## 2020-12-09 NOTE — PLAN OF CARE
Problem: Adult Inpatient Plan of Care  Goal: Plan of Care Review  Outcome: Ongoing, Progressing  Flowsheets  Taken 12/8/2020 1710 by Leny Nieves RN  Outcome Summary: NST reactive, continue to strain urine. Patient currently on pyridium. Patient denies taking any tylenol for pain. lr at 75 cc/hr and zosyn every 8 hours.  Taken 12/8/2020 0637 by Ignacio Kirby RN  Progress: improving  Plan of Care Reviewed With: patient  Goal: Patient-Specific Goal (Individualized)  Outcome: Ongoing, Progressing  Flowsheets  Taken 12/8/2020 0637 by Ignacio Kirby RN  Patient-Specific Goals (Include Timeframe): get better and go home  Individualized Care Needs: Zosyn q 8 and pyridium with meals  Taken 12/7/2020 0643 by Tesha Romero RN  Anxieties, Fears or Concerns: Patient has fear of needles and needing IV/labs.  Goal: Absence of Hospital-Acquired Illness or Injury  Outcome: Ongoing, Progressing  Intervention: Identify and Manage Fall Risk  Recent Flowsheet Documentation  Taken 12/9/2020 0351 by Yady Sanabria RN  Safety Promotion/Fall Prevention: safety round/check completed  Taken 12/9/2020 0022 by Yady Sanabria RN  Safety Promotion/Fall Prevention: safety round/check completed  Taken 12/8/2020 2121 by Yady Sanabria RN  Safety Promotion/Fall Prevention: safety round/check completed  Intervention: Prevent and Manage VTE (venous thromboembolism) Risk  Recent Flowsheet Documentation  Taken 12/8/2020 2121 by Yady Sanabria RN  VTE Prevention/Management: sequential compression devices on  Goal: Optimal Comfort and Wellbeing  Outcome: Ongoing, Progressing  Intervention: Provide Person-Centered Care  Recent Flowsheet Documentation  Taken 12/8/2020 2121 by Yady Sanabria RN  Trust Relationship/Rapport:   questions encouraged   questions answered   choices provided   care explained  Goal: Readiness for Transition of Care  Outcome: Ongoing, Progressing     Problem: Pain Acute  Goal: Optimal Pain Control  Outcome:  Ongoing, Progressing  Intervention: Optimize Psychosocial Wellbeing  Recent Flowsheet Documentation  Taken 12/8/2020 2121 by Yady Sanabria, RN  Diversional Activities: television     Problem: UTI (Urinary Tract Infection)  Goal: Improved Infection Symptoms  Outcome: Ongoing, Progressing  Intervention: Facilitate Optimal Urinary Elimination  Flowsheets (Taken 12/9/2020 0611)  Urinary Elimination Promotion: frequent voiding encouraged   Goal Outcome Evaluation:  Plan of Care Reviewed With: patient  Progress: improving

## 2020-12-10 NOTE — DISCHARGE SUMMARY
Discharge Summary    Patient Identification:  Name:   Julian Fleming  Age:   20 y.o.  :   2000  MRN:  [id number]    Admit Date:  2020    Discharge Date:  2020    Admitting Physician:  Pat Crowell MD    Discharge Physician:  Douglas Rodriguez MD    Admission Diagnosis:  IUP at 26 weeks.  Pyelonephritis.    Discharge Diagnosis:  IUP at 27 weeks.  Pyelonephritis.    Discharge Condition:  Good    Hospital Course:  Patient is a a 20 year old  admitted in late 2nd trimester with recurrent pyelonephritis.  She had an elevated WBC and previous history of UTI.  She was started on IV Zosyn.  Her clinical symptoms improved, she remained afebrile, WBC decreased an on hospital day number 3, she was stable for discharge.    Procedures:  None    Current Medications:    No medications prior to admission.       Discharge Condition:  Good    Discharge Disposition/Location:  Home    Douglas Rodriguez MD

## 2020-12-17 ENCOUNTER — ROUTINE PRENATAL (OUTPATIENT)
Dept: OBSTETRICS AND GYNECOLOGY | Facility: CLINIC | Age: 20
End: 2020-12-17

## 2020-12-17 VITALS — WEIGHT: 121 LBS | SYSTOLIC BLOOD PRESSURE: 103 MMHG | DIASTOLIC BLOOD PRESSURE: 70 MMHG | BODY MASS INDEX: 22.86 KG/M2

## 2020-12-17 DIAGNOSIS — O23.02 PYELONEPHRITIS AFFECTING PREGNANCY IN SECOND TRIMESTER: ICD-10-CM

## 2020-12-17 DIAGNOSIS — Z34.80 SUPERVISION OF OTHER NORMAL PREGNANCY, ANTEPARTUM: Primary | ICD-10-CM

## 2020-12-17 LAB
GLUCOSE UR STRIP-MCNC: NEGATIVE MG/DL
PROT UR STRIP-MCNC: ABNORMAL MG/DL

## 2020-12-17 PROCEDURE — 99213 OFFICE O/P EST LOW 20 MIN: CPT | Performed by: OBSTETRICS & GYNECOLOGY

## 2020-12-17 RX ORDER — NITROFURANTOIN 25; 75 MG/1; MG/1
100 CAPSULE ORAL NIGHTLY
Qty: 90 CAPSULE | Refills: 1 | Status: SHIPPED | OUTPATIENT
Start: 2020-12-17 | End: 2021-02-14 | Stop reason: HOSPADM

## 2020-12-17 NOTE — PROGRESS NOTES
Chief Complaint   Patient presents with   • Routine Prenatal Visit      Julian Fleming is a 20 y.o.  at 28w3d   No bleeding, no LOF; no cramping  Fetal movement normal  No fever, back pain resolved.  No dysuria.   Completed antibiotic about 2-3 days ago    /70   Wt 54.9 kg (121 lb)   LMP 2020 (Within Days)   BMI 22.86 kg/m²    Gen: NAD, well appearing  Abd: nontender  See OB Flowsheet    ASSESSMENT:   1. IUP at 28w3d   2. Pyelo, possible nephrolithiasis this pregnancy  PLAN:  Repeat urine culture. Will send Rx for suppression given pyelo. Reviewed renal US with possible stone.  Pyelo precautions reviewed  GCT, CBC today.  Reviewed common symptoms of the third trimester.  Counseled on labor precautions and anticipated fetal movements.  Reviewed kick counts.  Patient is aware of the location of L&D.     Counseled on risks/benefits of bilateral tubal ligation.  She was counseled that this should be considered a permanent procedure.  Although there are surgeries to reverse this, there not 100% successful.  She is adamant that she desires no further childbearing.  We discussed that bilateral tubal ligations usually do not change menstrual cycles, so some women are on hormonal therapy to control bleeding or regularly periods.  We discussed that it also does not prevent sexually transmitted infections and so condoms are recommended to prevent transmission of these diseases.  We discussed other options for contraception including LARCs.  Patient was counseled that there is a risk of failure and if the tubal ligation were to fail, there is an increased risk of ectopic pregnancy.  She was counseled that she missed a period she should take a pregnancy test and if positive be seen by a physician immediately.  We reviewed the risk of regret and desire for future childbearing.  We discussed that this risk is higher in women who have fewer children or or at a younger age.  Patient had all questions answered at  the end of this discussion.  She will sign tubal papers at the .    Return in about 2 weeks (around 12/31/2020) for OB visit.  Orders Placed This Encounter   Procedures   • Urine Culture - Urine, Urine, Clean Catch   • POC Urinalysis Dipstick     This is an external result entered through the Results Console.

## 2020-12-18 ENCOUNTER — TELEPHONE (OUTPATIENT)
Dept: OBSTETRICS AND GYNECOLOGY | Facility: CLINIC | Age: 20
End: 2020-12-18

## 2020-12-18 LAB
ERYTHROCYTE [DISTWIDTH] IN BLOOD BY AUTOMATED COUNT: 11.9 % (ref 12.3–15.4)
GLUCOSE 1H P 50 G GLC PO SERPL-MCNC: 104 MG/DL (ref 65–139)
HCT VFR BLD AUTO: 29.2 % (ref 34–46.6)
HGB BLD-MCNC: 9.2 G/DL (ref 12–15.9)
MCH RBC QN AUTO: 27.5 PG (ref 26.6–33)
MCHC RBC AUTO-ENTMCNC: 31.5 G/DL (ref 31.5–35.7)
MCV RBC AUTO: 87.4 FL (ref 79–97)
PLATELET # BLD AUTO: 364 10*3/MM3 (ref 140–450)
RBC # BLD AUTO: 3.34 10*6/MM3 (ref 3.77–5.28)
WBC # BLD AUTO: 11.82 10*3/MM3 (ref 3.4–10.8)

## 2020-12-18 RX ORDER — FERROUS SULFATE 325(65) MG
325 TABLET ORAL EVERY OTHER DAY
Qty: 90 TABLET | Refills: 1 | Status: SHIPPED | OUTPATIENT
Start: 2020-12-18 | End: 2021-05-11

## 2020-12-20 LAB
BACTERIA UR CULT: ABNORMAL
BACTERIA UR CULT: ABNORMAL
OTHER ANTIBIOTIC SUSC ISLT: ABNORMAL

## 2020-12-21 ENCOUNTER — TELEPHONE (OUTPATIENT)
Dept: OBSTETRICS AND GYNECOLOGY | Facility: CLINIC | Age: 20
End: 2020-12-21

## 2020-12-21 RX ORDER — NITROFURANTOIN 25; 75 MG/1; MG/1
100 CAPSULE ORAL 2 TIMES DAILY
Qty: 14 CAPSULE | Refills: 0 | Status: SHIPPED | OUTPATIENT
Start: 2020-12-21 | End: 2020-12-28

## 2020-12-21 NOTE — TELEPHONE ENCOUNTER
12/21/20  Patient has been informed of E.coli in urens. Pt is aware of increased Macrobid to 100 BID and then follow up w/ 100 mg nightly. Pt had no further questions.

## 2020-12-21 NOTE — TELEPHONE ENCOUNTER
Urine culture resulted again with E.coli. She needs to increase macrobid to 100mg BID x 7 days (new Rx sent) then follow with 100mg nightly. Thanks!

## 2020-12-31 ENCOUNTER — ROUTINE PRENATAL (OUTPATIENT)
Dept: OBSTETRICS AND GYNECOLOGY | Facility: CLINIC | Age: 20
End: 2020-12-31

## 2020-12-31 VITALS — DIASTOLIC BLOOD PRESSURE: 74 MMHG | BODY MASS INDEX: 23.24 KG/M2 | SYSTOLIC BLOOD PRESSURE: 112 MMHG | WEIGHT: 123 LBS

## 2020-12-31 DIAGNOSIS — O23.02 PYELONEPHRITIS AFFECTING PREGNANCY IN SECOND TRIMESTER: Primary | ICD-10-CM

## 2020-12-31 DIAGNOSIS — O99.019 MATERNAL ANEMIA IN PREGNANCY, ANTEPARTUM: ICD-10-CM

## 2020-12-31 LAB
GLUCOSE UR STRIP-MCNC: NEGATIVE MG/DL
PROT UR STRIP-MCNC: ABNORMAL MG/DL

## 2020-12-31 PROCEDURE — 99213 OFFICE O/P EST LOW 20 MIN: CPT | Performed by: OBSTETRICS & GYNECOLOGY

## 2020-12-31 NOTE — PROGRESS NOTES
Chief Complaint   Patient presents with   • Routine Prenatal Visit      Julian Fleming is a 20 y.o.  at 30w3d   No bleeding, no LOF; no cramping  Fetal movement normal  Completed antibiotic for UTI, continuing suppression    /74   Wt 55.8 kg (123 lb)   LMP 2020 (Within Days)   BMI 23.24 kg/m²    Gen: NAD, well appearing  Abd: nontender  See OB Flowsheet    ASSESSMENT:   1. IUP at 30w3d   2. Pyleonephritis, on suppression  3. Anemia, on Iron  PLAN:  Growth US next visit for anemia  Reviewed GCT, CBC. Cont Iron. Repeat in approx 1 month.  Reviewed common symptoms of the third trimester.  Counseled on labor precautions and anticipated fetal movements.  Reviewed kick counts.  Patient is aware of the location of L&D.     Cont macrobid suppression and rpt urine culture.  She is moving next week - reviewed lifting restrictions.   Return in about 2 weeks (around 2021) for growth US, OB visit.  Orders Placed This Encounter   Procedures   • Urine Culture - Urine, Urine, Clean Catch   • US Ob Follow Up Transabdominal Approach each additional gestation     Standing Status:   Future     Standing Expiration Date:   2021     Order Specific Question:   Reason for Exam:     Answer:   anemia in pregnancy

## 2021-01-03 LAB
BACTERIA UR CULT: ABNORMAL
BACTERIA UR CULT: ABNORMAL
OTHER ANTIBIOTIC SUSC ISLT: ABNORMAL

## 2021-01-04 ENCOUNTER — TELEPHONE (OUTPATIENT)
Dept: OBSTETRICS AND GYNECOLOGY | Facility: CLINIC | Age: 21
End: 2021-01-04

## 2021-01-14 ENCOUNTER — ROUTINE PRENATAL (OUTPATIENT)
Dept: OBSTETRICS AND GYNECOLOGY | Facility: CLINIC | Age: 21
End: 2021-01-14

## 2021-01-14 VITALS — SYSTOLIC BLOOD PRESSURE: 121 MMHG | WEIGHT: 126 LBS | BODY MASS INDEX: 23.81 KG/M2 | DIASTOLIC BLOOD PRESSURE: 63 MMHG

## 2021-01-14 DIAGNOSIS — O23.02 PYELONEPHRITIS AFFECTING PREGNANCY IN SECOND TRIMESTER: Primary | ICD-10-CM

## 2021-01-14 LAB
GLUCOSE UR STRIP-MCNC: NEGATIVE MG/DL
PROT UR STRIP-MCNC: ABNORMAL MG/DL

## 2021-01-14 PROCEDURE — 99213 OFFICE O/P EST LOW 20 MIN: CPT | Performed by: OBSTETRICS & GYNECOLOGY

## 2021-01-14 NOTE — PROGRESS NOTES
Chief Complaint   Patient presents with   • Routine Prenatal Visit      Julian Fleming is a 20 y.o.  at 32w3d   No bleeding, no LOF; no cramping  Fetal movement normal  Reports compliance with antibiotics. No symptoms of UTI    /63   Wt 57.2 kg (126 lb)   LMP 2020 (Within Days)   BMI 23.81 kg/m²    Gen: NAD, well appearing  Abd: nontender  See OB Flowsheet    ASSESSMENT:   1. IUP at 32w3d   2. Pyelonephritis this pregnancy  3. Anemia, on iron  PLAN:  Reviewed growth US  Repeat urine culture today, continue suppression  Repeat H&H next visit  Declines Tdap  Reviewed common symptoms of the third trimester.  Counseled on labor precautions and anticipated fetal movements.  Reviewed kick counts.  Patient is aware of the location of L&D.     Return in about 2 weeks (around 2021).  Orders Placed This Encounter   Procedures   • Urine Culture - Urine, Urine, Clean Catch   • POC Urinalysis Dipstick     This is an external result entered through the Results Console.

## 2021-01-16 LAB
BACTERIA UR CULT: NORMAL
BACTERIA UR CULT: NORMAL

## 2021-01-28 ENCOUNTER — ROUTINE PRENATAL (OUTPATIENT)
Dept: OBSTETRICS AND GYNECOLOGY | Facility: CLINIC | Age: 21
End: 2021-01-28

## 2021-01-28 VITALS — SYSTOLIC BLOOD PRESSURE: 116 MMHG | BODY MASS INDEX: 24.75 KG/M2 | WEIGHT: 131 LBS | DIASTOLIC BLOOD PRESSURE: 67 MMHG

## 2021-01-28 DIAGNOSIS — Z34.80 SUPERVISION OF OTHER NORMAL PREGNANCY, ANTEPARTUM: Primary | ICD-10-CM

## 2021-01-28 LAB
GLUCOSE UR STRIP-MCNC: NEGATIVE MG/DL
PROT UR STRIP-MCNC: ABNORMAL MG/DL

## 2021-01-28 PROCEDURE — 99214 OFFICE O/P EST MOD 30 MIN: CPT | Performed by: OBSTETRICS & GYNECOLOGY

## 2021-01-28 NOTE — PROGRESS NOTES
Chief Complaint   Patient presents with   • Routine Prenatal Visit     pt desires STI test       Julian Fleming is a 20 y.o.  at 34w3d   No bleeding, no LOF; no cramping  Fetal movement normal  Taking daily antibiotic  Would like testing for gonorrhea/chlamydia/trichomonas today    /67   Wt 59.4 kg (131 lb)   LMP 2020 (Within Days)   BMI 24.75 kg/m²    Gen: NAD, well appearing  Abd: nontender  See OB Flowsheet    ASSESSMENT:   1. IUP at 34w3d   2. Asymptomatic bacteruria  3.Anemia  PLAN:  Plan for Hemoglobin repeat. Continue Iron for now. If still low, consider IV iron  GC/CT/Trichomonas testing today  Reviewed common symptoms of the third trimester.  Counseled on labor precautions and anticipated fetal movements.  Reviewed kick counts.  Patient is aware of the location of L&D.     Return in about 2 weeks (around 2021).  Orders Placed This Encounter   Procedures   • Chlamydia trachomatis, Neisseria gonorrhoeae, Trichomonas vaginalis, PCR - Swab, Vagina   • Hemoglobin & Hematocrit, Blood   • POC Urinalysis Dipstick     This is an external result entered through the Results Console.

## 2021-01-29 DIAGNOSIS — O99.019 IRON DEFICIENCY ANEMIA DURING PREGNANCY: Primary | ICD-10-CM

## 2021-01-29 DIAGNOSIS — D50.9 IRON DEFICIENCY ANEMIA DURING PREGNANCY: Primary | ICD-10-CM

## 2021-01-29 LAB
HCT VFR BLD AUTO: 30.3 % (ref 34–46.6)
HGB BLD-MCNC: 9.5 G/DL (ref 12–15.9)

## 2021-02-11 ENCOUNTER — ROUTINE PRENATAL (OUTPATIENT)
Dept: OBSTETRICS AND GYNECOLOGY | Facility: CLINIC | Age: 21
End: 2021-02-11

## 2021-02-11 VITALS — SYSTOLIC BLOOD PRESSURE: 119 MMHG | BODY MASS INDEX: 25.51 KG/M2 | WEIGHT: 135 LBS | DIASTOLIC BLOOD PRESSURE: 77 MMHG

## 2021-02-11 DIAGNOSIS — Z34.80 SUPERVISION OF OTHER NORMAL PREGNANCY, ANTEPARTUM: Primary | ICD-10-CM

## 2021-02-11 LAB
GLUCOSE UR STRIP-MCNC: ABNORMAL MG/DL
PROT UR STRIP-MCNC: ABNORMAL MG/DL

## 2021-02-11 PROCEDURE — 99213 OFFICE O/P EST LOW 20 MIN: CPT | Performed by: OBSTETRICS & GYNECOLOGY

## 2021-02-11 NOTE — PROGRESS NOTES
Chief Complaint   Patient presents with   • Routine Prenatal Visit      Julian Fleming is a 20 y.o.  at 36w3d   No bleeding, no LOF; no cramping  Fetal movement normal    /77   Wt 61.2 kg (135 lb)   LMP 2020 (Within Days)   BMI 25.51 kg/m²    Gen: NAD, well appearing  Abd: nontender  See OB Flowsheet    ASSESSMENT:   1. IUP at 36w3d   2. Asymptomatic bacteruria  3. Anemia  PLAN:  Increase Iron to BID, pt reports tolerating well  GBS today.   Aware of negative STI testing from last visit  Reviewed common symptoms of the third trimester.  Counseled on labor precautions and anticipated fetal movements.  Reviewed kick counts.  Patient is aware of the location of L&D.     No follow-ups on file.  Orders Placed This Encounter   Procedures   • Group B Streptococcus Culture - Swab, Vaginal/Rectum   • POC Urinalysis Dipstick     This is an external result entered through the Results Console.

## 2021-02-12 ENCOUNTER — HOSPITAL ENCOUNTER (INPATIENT)
Facility: HOSPITAL | Age: 21
LOS: 2 days | Discharge: HOME | End: 2021-02-14
Attending: OBSTETRICS & GYNECOLOGY | Admitting: OBSTETRICS & GYNECOLOGY

## 2021-02-12 ENCOUNTER — ANESTHESIA (OUTPATIENT)
Dept: LABOR AND DELIVERY | Facility: HOSPITAL | Age: 21
End: 2021-02-12

## 2021-02-12 ENCOUNTER — ANESTHESIA EVENT (OUTPATIENT)
Dept: LABOR AND DELIVERY | Facility: HOSPITAL | Age: 21
End: 2021-02-12

## 2021-02-12 PROBLEM — Z34.90 PREGNANCY: Status: ACTIVE | Noted: 2021-02-12

## 2021-02-12 LAB
ABO GROUP BLD: NORMAL
AMPHET+METHAMPHET UR QL: NEGATIVE
BARBITURATES UR QL SCN: NEGATIVE
BASOPHILS # BLD AUTO: 0.07 10*3/MM3 (ref 0–0.2)
BASOPHILS NFR BLD AUTO: 0.7 % (ref 0–1.5)
BENZODIAZ UR QL SCN: NEGATIVE
BLD GP AB SCN SERPL QL: NEGATIVE
CANNABINOIDS SERPL QL: NEGATIVE
COCAINE UR QL: NEGATIVE
DEPRECATED RDW RBC AUTO: 37.9 FL (ref 37–54)
EOSINOPHIL # BLD AUTO: 0.1 10*3/MM3 (ref 0–0.4)
EOSINOPHIL NFR BLD AUTO: 1 % (ref 0.3–6.2)
ERYTHROCYTE [DISTWIDTH] IN BLOOD BY AUTOMATED COUNT: 13.4 % (ref 12.3–15.4)
HCT VFR BLD AUTO: 29.3 % (ref 34–46.6)
HGB BLD-MCNC: 9.2 G/DL (ref 12–15.9)
IMM GRANULOCYTES # BLD AUTO: 0.1 10*3/MM3 (ref 0–0.05)
IMM GRANULOCYTES NFR BLD AUTO: 1 % (ref 0–0.5)
LYMPHOCYTES # BLD AUTO: 2.24 10*3/MM3 (ref 0.7–3.1)
LYMPHOCYTES NFR BLD AUTO: 22.1 % (ref 19.6–45.3)
MCH RBC QN AUTO: 24.8 PG (ref 26.6–33)
MCHC RBC AUTO-ENTMCNC: 31.4 G/DL (ref 31.5–35.7)
MCV RBC AUTO: 79 FL (ref 79–97)
METHADONE UR QL SCN: NEGATIVE
MONOCYTES # BLD AUTO: 0.61 10*3/MM3 (ref 0.1–0.9)
MONOCYTES NFR BLD AUTO: 6 % (ref 5–12)
NEUTROPHILS NFR BLD AUTO: 69.2 % (ref 42.7–76)
NEUTROPHILS NFR BLD AUTO: 7.02 10*3/MM3 (ref 1.7–7)
NITRAZINE EXPIRATION DATE: NORMAL
NITRAZINE LOT NUMBER: NORMAL
NRBC BLD AUTO-RTO: 0 /100 WBC (ref 0–0.2)
OPIATES UR QL: NEGATIVE
OXYCODONE UR QL SCN: NEGATIVE
PH FLD: 7 [PH]
PLATELET # BLD AUTO: 281 10*3/MM3 (ref 140–450)
PMV BLD AUTO: 11.7 FL (ref 6–12)
RBC # BLD AUTO: 3.71 10*6/MM3 (ref 3.77–5.28)
RH BLD: POSITIVE
SARS-COV-2 RNA RESP QL NAA+PROBE: NOT DETECTED
T&S EXPIRATION DATE: NORMAL
WBC # BLD AUTO: 10.14 10*3/MM3 (ref 3.4–10.8)

## 2021-02-12 PROCEDURE — 86900 BLOOD TYPING SEROLOGIC ABO: CPT | Performed by: OBSTETRICS & GYNECOLOGY

## 2021-02-12 PROCEDURE — 99202 OFFICE O/P NEW SF 15 MIN: CPT | Performed by: OBSTETRICS & GYNECOLOGY

## 2021-02-12 PROCEDURE — U0003 INFECTIOUS AGENT DETECTION BY NUCLEIC ACID (DNA OR RNA); SEVERE ACUTE RESPIRATORY SYNDROME CORONAVIRUS 2 (SARS-COV-2) (CORONAVIRUS DISEASE [COVID-19]), AMPLIFIED PROBE TECHNIQUE, MAKING USE OF HIGH THROUGHPUT TECHNOLOGIES AS DESCRIBED BY CMS-2020-01-R: HCPCS | Performed by: OBSTETRICS & GYNECOLOGY

## 2021-02-12 PROCEDURE — 80307 DRUG TEST PRSMV CHEM ANLYZR: CPT | Performed by: OBSTETRICS & GYNECOLOGY

## 2021-02-12 PROCEDURE — 85025 COMPLETE CBC W/AUTO DIFF WBC: CPT | Performed by: OBSTETRICS & GYNECOLOGY

## 2021-02-12 PROCEDURE — 86850 RBC ANTIBODY SCREEN: CPT | Performed by: OBSTETRICS & GYNECOLOGY

## 2021-02-12 PROCEDURE — 86901 BLOOD TYPING SEROLOGIC RH(D): CPT | Performed by: OBSTETRICS & GYNECOLOGY

## 2021-02-12 PROCEDURE — 25010000002 PENICILLIN G POTASSIUM PER 600000 UNITS: Performed by: OBSTETRICS & GYNECOLOGY

## 2021-02-12 PROCEDURE — C1755 CATHETER, INTRASPINAL: HCPCS | Performed by: ANESTHESIOLOGY

## 2021-02-12 PROCEDURE — 59410 OBSTETRICAL CARE: CPT | Performed by: OBSTETRICS & GYNECOLOGY

## 2021-02-12 PROCEDURE — C1755 CATHETER, INTRASPINAL: HCPCS

## 2021-02-12 RX ORDER — PHYTONADIONE 1 MG/.5ML
INJECTION, EMULSION INTRAMUSCULAR; INTRAVENOUS; SUBCUTANEOUS
Status: DISPENSED
Start: 2021-02-12 | End: 2021-02-13

## 2021-02-12 RX ORDER — MISOPROSTOL 200 UG/1
800 TABLET ORAL AS NEEDED
Status: DISCONTINUED | OUTPATIENT
Start: 2021-02-12 | End: 2021-02-12 | Stop reason: HOSPADM

## 2021-02-12 RX ORDER — TRANEXAMIC ACID 100 MG/ML
1000 INJECTION, SOLUTION INTRAVENOUS ONCE AS NEEDED
Status: DISCONTINUED | OUTPATIENT
Start: 2021-02-12 | End: 2021-02-12 | Stop reason: HOSPADM

## 2021-02-12 RX ORDER — DOCUSATE SODIUM 100 MG/1
100 CAPSULE, LIQUID FILLED ORAL 2 TIMES DAILY
Status: DISCONTINUED | OUTPATIENT
Start: 2021-02-12 | End: 2021-02-14 | Stop reason: HOSPADM

## 2021-02-12 RX ORDER — OXYTOCIN-SODIUM CHLORIDE 0.9% IV SOLN 30 UNIT/500ML 30-0.9/5 UT/ML-%
999 SOLUTION INTRAVENOUS ONCE
Status: COMPLETED | OUTPATIENT
Start: 2021-02-12 | End: 2021-02-12

## 2021-02-12 RX ORDER — HYDROXYZINE 50 MG/1
50 TABLET, FILM COATED ORAL NIGHTLY PRN
Status: DISCONTINUED | OUTPATIENT
Start: 2021-02-12 | End: 2021-02-14 | Stop reason: HOSPADM

## 2021-02-12 RX ORDER — METHYLERGONOVINE MALEATE 0.2 MG/ML
200 INJECTION INTRAVENOUS ONCE AS NEEDED
Status: DISCONTINUED | OUTPATIENT
Start: 2021-02-12 | End: 2021-02-12 | Stop reason: HOSPADM

## 2021-02-12 RX ORDER — EPHEDRINE SULFATE 50 MG/ML
10 INJECTION, SOLUTION INTRAVENOUS
Status: DISCONTINUED | OUTPATIENT
Start: 2021-02-12 | End: 2021-02-12 | Stop reason: HOSPADM

## 2021-02-12 RX ORDER — PRENATAL VIT/IRON FUM/FOLIC AC 27MG-0.8MG
1 TABLET ORAL DAILY
Status: DISCONTINUED | OUTPATIENT
Start: 2021-02-13 | End: 2021-02-14 | Stop reason: HOSPADM

## 2021-02-12 RX ORDER — BISACODYL 10 MG
10 SUPPOSITORY, RECTAL RECTAL DAILY PRN
Status: DISCONTINUED | OUTPATIENT
Start: 2021-02-13 | End: 2021-02-14 | Stop reason: HOSPADM

## 2021-02-12 RX ORDER — PENICILLIN G 3000000 [IU]/50ML
3 INJECTION, SOLUTION INTRAVENOUS EVERY 4 HOURS
Status: DISCONTINUED | OUTPATIENT
Start: 2021-02-12 | End: 2021-02-12

## 2021-02-12 RX ORDER — ONDANSETRON 2 MG/ML
4 INJECTION INTRAMUSCULAR; INTRAVENOUS EVERY 6 HOURS PRN
Status: DISCONTINUED | OUTPATIENT
Start: 2021-02-12 | End: 2021-02-14 | Stop reason: HOSPADM

## 2021-02-12 RX ORDER — CLINDAMYCIN PHOSPHATE 900 MG/50ML
900 INJECTION INTRAVENOUS EVERY 8 HOURS
Status: DISCONTINUED | OUTPATIENT
Start: 2021-02-12 | End: 2021-02-12

## 2021-02-12 RX ORDER — SODIUM CHLORIDE, SODIUM LACTATE, POTASSIUM CHLORIDE, CALCIUM CHLORIDE 600; 310; 30; 20 MG/100ML; MG/100ML; MG/100ML; MG/100ML
125 INJECTION, SOLUTION INTRAVENOUS CONTINUOUS
Status: DISCONTINUED | OUTPATIENT
Start: 2021-02-12 | End: 2021-02-12

## 2021-02-12 RX ORDER — OXYCODONE HYDROCHLORIDE AND ACETAMINOPHEN 5; 325 MG/1; MG/1
1 TABLET ORAL EVERY 4 HOURS PRN
Status: DISCONTINUED | OUTPATIENT
Start: 2021-02-12 | End: 2021-02-14 | Stop reason: HOSPADM

## 2021-02-12 RX ORDER — SODIUM CHLORIDE 0.9 % (FLUSH) 0.9 %
1-10 SYRINGE (ML) INJECTION AS NEEDED
Status: DISCONTINUED | OUTPATIENT
Start: 2021-02-12 | End: 2021-02-14 | Stop reason: HOSPADM

## 2021-02-12 RX ORDER — ERYTHROMYCIN 5 MG/G
OINTMENT OPHTHALMIC
Status: DISPENSED
Start: 2021-02-12 | End: 2021-02-13

## 2021-02-12 RX ORDER — IBUPROFEN 600 MG/1
600 TABLET ORAL EVERY 6 HOURS PRN
Status: DISCONTINUED | OUTPATIENT
Start: 2021-02-12 | End: 2021-02-14 | Stop reason: HOSPADM

## 2021-02-12 RX ORDER — MAGNESIUM CARB/ALUMINUM HYDROX 105-160MG
30 TABLET,CHEWABLE ORAL ONCE
Status: DISCONTINUED | OUTPATIENT
Start: 2021-02-12 | End: 2021-02-12 | Stop reason: HOSPADM

## 2021-02-12 RX ORDER — CARBOPROST TROMETHAMINE 250 UG/ML
250 INJECTION, SOLUTION INTRAMUSCULAR AS NEEDED
Status: DISCONTINUED | OUTPATIENT
Start: 2021-02-12 | End: 2021-02-12 | Stop reason: HOSPADM

## 2021-02-12 RX ORDER — HYDROCORTISONE 25 MG/G
1 CREAM TOPICAL AS NEEDED
Status: DISCONTINUED | OUTPATIENT
Start: 2021-02-12 | End: 2021-02-14 | Stop reason: HOSPADM

## 2021-02-12 RX ORDER — OXYTOCIN-SODIUM CHLORIDE 0.9% IV SOLN 30 UNIT/500ML 30-0.9/5 UT/ML-%
125 SOLUTION INTRAVENOUS CONTINUOUS PRN
Status: COMPLETED | OUTPATIENT
Start: 2021-02-12 | End: 2021-02-12

## 2021-02-12 RX ORDER — ONDANSETRON 4 MG/1
4 TABLET, FILM COATED ORAL EVERY 8 HOURS PRN
Status: DISCONTINUED | OUTPATIENT
Start: 2021-02-12 | End: 2021-02-14 | Stop reason: HOSPADM

## 2021-02-12 RX ORDER — LIDOCAINE HYDROCHLORIDE 10 MG/ML
5 INJECTION, SOLUTION EPIDURAL; INFILTRATION; INTRACAUDAL; PERINEURAL AS NEEDED
Status: DISCONTINUED | OUTPATIENT
Start: 2021-02-12 | End: 2021-02-12 | Stop reason: HOSPADM

## 2021-02-12 RX ORDER — OXYTOCIN-SODIUM CHLORIDE 0.9% IV SOLN 30 UNIT/500ML 30-0.9/5 UT/ML-%
250 SOLUTION INTRAVENOUS CONTINUOUS PRN
Status: DISPENSED | OUTPATIENT
Start: 2021-02-12 | End: 2021-02-12

## 2021-02-12 RX ORDER — LIDOCAINE HYDROCHLORIDE AND EPINEPHRINE 15; 5 MG/ML; UG/ML
INJECTION, SOLUTION EPIDURAL AS NEEDED
Status: DISCONTINUED | OUTPATIENT
Start: 2021-02-12 | End: 2021-02-12 | Stop reason: SURG

## 2021-02-12 RX ORDER — OXYTOCIN-SODIUM CHLORIDE 0.9% IV SOLN 30 UNIT/500ML 30-0.9/5 UT/ML-%
2-20 SOLUTION INTRAVENOUS
Status: DISCONTINUED | OUTPATIENT
Start: 2021-02-12 | End: 2021-02-12 | Stop reason: HOSPADM

## 2021-02-12 RX ADMIN — IBUPROFEN 600 MG: 600 TABLET, FILM COATED ORAL at 20:50

## 2021-02-12 RX ADMIN — PENICILLIN G 3 MILLION UNITS: 3000000 INJECTION, SOLUTION INTRAVENOUS at 13:02

## 2021-02-12 RX ADMIN — EPHEDRINE SULFATE 10 MG: 50 INJECTION INTRAVENOUS at 15:38

## 2021-02-12 RX ADMIN — OXYTOCIN 125 ML/HR: 10 INJECTION INTRAVENOUS at 17:12

## 2021-02-12 RX ADMIN — LIDOCAINE HYDROCHLORIDE AND EPINEPHRINE 5 ML: 15; 5 INJECTION, SOLUTION EPIDURAL at 10:02

## 2021-02-12 RX ADMIN — EPHEDRINE SULFATE 10 MG: 50 INJECTION INTRAVENOUS at 15:31

## 2021-02-12 RX ADMIN — PENICILLIN G POTASSIUM 5 MILLION UNITS: 5000000 INJECTION, POWDER, FOR SOLUTION INTRAMUSCULAR; INTRAVENOUS at 09:07

## 2021-02-12 RX ADMIN — DOCUSATE SODIUM 100 MG: 100 CAPSULE, LIQUID FILLED ORAL at 20:50

## 2021-02-12 RX ADMIN — SODIUM CHLORIDE, POTASSIUM CHLORIDE, SODIUM LACTATE AND CALCIUM CHLORIDE 1000 ML: 600; 310; 30; 20 INJECTION, SOLUTION INTRAVENOUS at 10:05

## 2021-02-12 RX ADMIN — OXYTOCIN 999 ML/HR: 10 INJECTION, SOLUTION INTRAMUSCULAR; INTRAVENOUS at 15:53

## 2021-02-12 RX ADMIN — SODIUM CHLORIDE, POTASSIUM CHLORIDE, SODIUM LACTATE AND CALCIUM CHLORIDE 125 ML/HR: 600; 310; 30; 20 INJECTION, SOLUTION INTRAVENOUS at 11:35

## 2021-02-12 RX ADMIN — Medication 8 ML/HR: at 10:14

## 2021-02-12 RX ADMIN — SODIUM CHLORIDE, POTASSIUM CHLORIDE, SODIUM LACTATE AND CALCIUM CHLORIDE 125 ML/HR: 600; 310; 30; 20 INJECTION, SOLUTION INTRAVENOUS at 06:00

## 2021-02-12 RX ADMIN — CLINDAMYCIN IN 5 PERCENT DEXTROSE 900 MG: 18 INJECTION, SOLUTION INTRAVENOUS at 06:01

## 2021-02-12 RX ADMIN — OXYTOCIN 2 MILLI-UNITS/MIN: 10 INJECTION, SOLUTION INTRAMUSCULAR; INTRAVENOUS at 09:19

## 2021-02-12 NOTE — NURSING NOTE
Dr. Mueller called on cell phone. Discussed with Dr. Mueller that patient is still 3 cm dilated and patient is meaghan but not feeling her contractions. Order received to start pitocin per protocol. Discussed with Dr. Mueller that baby is ballotable. Order received for Dr. Zarate- hospitalist to scan patient with ultrasound to clarify if baby is head down before starting pitocin. Order received to start vancomycin for pharmacy and discontinue clindamycin. r/v

## 2021-02-12 NOTE — ANESTHESIA PROCEDURE NOTES
Labor Epidural      Patient reassessed immediately prior to procedure    Patient location during procedure: OB  Start Time: 2/12/2021 9:56 AM  Stop Time: 2/12/2021 10:14 AM  Performed By  Anesthesiologist: Rickey Coyne MD  Preanesthetic Checklist  Completed: patient identified, site marked, surgical consent, pre-op evaluation, timeout performed, IV checked, risks and benefits discussed and monitors and equipment checked  Prep:  Pt Position:sitting  Sterile Tech:cap, gloves, mask and sterile barrier  Prep:povidone-iodine 7.5% surgical scrub  Monitoring:blood pressure monitoring, continuous pulse oximetry and EKG  Epidural Block Procedure:  Approach:midline  Guidance:landmark technique and palpation technique  Location:L5-S1  Needle Type:Tuohy  Needle Gauge:17  Loss of Resistance Medium: air  Paresthesia: none  Aspiration:negative  Test Dose:negative  Number of Attempts: 1  Post Assessment:  Dressing:occlusive dressing applied and secured with tape  Pt Tolerance:patient tolerated the procedure well with no apparent complications  Complications:no

## 2021-02-12 NOTE — OBED NOTES
GEORGE Note OBHG    This is actually the history and physical performed by the OB hospitalist and I have seen the patient and agree with his history, physical exam and plan going forward.  ESTHELA Mueller    Patient Name: Julian Fleming  YOB: 2000  MRN: [id number]  Admission Date: 2021  3:17 AM  Date of Service: 2021    Chief Complaint: Leaking Fluid (GEORGE- PT reports gush of fluid at approximately 2330. PT reports +FM. Denies VB or ctx. )        Subjective     Julian Fleming is a 20 y.o. female  at 36w4d with Estimated Date of Delivery: 3/8/21 who presents with the chief complaint listed above.  She sees Pat Crowell MD for her prenatal care. Her pregnancy has been complicated by: History of pyelonephritis in the second trimester.  Patient presenting at this time for possible rupture of membranes. She describes a large cluster of fluid around 11:30 PM last night. She has continued to leak since then or for about 4 hours.    She describes fetal movement as normal.  .  She denies vaginal bleeding. She is not feeling contractions.          Objective   Patient Active Problem List    Diagnosis   • Pyelonephritis affecting pregnancy in second trimester [O23.02]   • Migraine [G43.909]        OB History    Para Term  AB Living   2 1 1 0 0 1   SAB TAB Ectopic Molar Multiple Live Births   0 0 0 0 0 1      # Outcome Date GA Lbr Frandy/2nd Weight Sex Delivery Anes PTL Lv   2 Current            1 Term 19 39w6d 05:59 / 04:21 3217 g (7 lb 1.5 oz) M Vag-Spont EPI N SHMUEL      Birth Comments: scale 4      Name: JULIO CESAR FLEMING      Apgar1: 8  Apgar5: 9        Past Medical History:   Diagnosis Date   • Anemia    • Chlamydia    • Migraine    • Urinary tract infection        History reviewed. No pertinent surgical history.    No current facility-administered medications on file prior to encounter.      Current Outpatient Medications on File Prior to Encounter   Medication Sig Dispense Refill    • ferrous sulfate 325 (65 FE) MG tablet Take 1 tablet by mouth Every Other Day. 90 tablet 1   • nitrofurantoin, macrocrystal-monohydrate, (Macrobid) 100 MG capsule Take 1 capsule by mouth Every Night for 90 days. 90 capsule 1   • PRENATAL GUMMY VITAMIN Chew 1 each Daily.         Allergies   Allergen Reactions   • Cefzil [Cefprozil] Rash       Family History   Problem Relation Age of Onset   • Breast cancer Neg Hx    • Ovarian cancer Neg Hx    • Uterine cancer Neg Hx    • Colon cancer Neg Hx    • Deep vein thrombosis Neg Hx    • Pulmonary embolism Neg Hx        Social History     Socioeconomic History   • Marital status: Single     Spouse name: Not on file   • Number of children: Not on file   • Years of education: Not on file   • Highest education level: Not on file   Tobacco Use   • Smoking status: Never Smoker   • Smokeless tobacco: Never Used   Substance and Sexual Activity   • Alcohol use: No     Comment: quit when finding out was pregnant   • Drug use: No   • Sexual activity: Yes     Partners: Male     Birth control/protection: None           Review of Systems   Constitutional: Negative for chills and fever.   HENT: Negative.    Eyes: Negative for photophobia and visual disturbance.   Respiratory: Negative for cough, chest tightness and shortness of breath.    Cardiovascular: Negative for leg swelling.   Gastrointestinal: Negative for abdominal pain, diarrhea, nausea and vomiting.   Genitourinary: Positive for vaginal discharge ( Complains of clear vaginal discharge). Negative for dysuria, flank pain, hematuria, pelvic pain and vaginal bleeding.   Musculoskeletal: Negative for back pain.   Neurological: Negative for dizziness, weakness and headaches.   Normal fetal movement    PHYSICAL EXAM:      VITAL SIGNS:  Vitals:    02/12/21 0320 02/12/21 0335 02/12/21 0348   BP:  109/71    BP Location:  Left arm    Patient Position:  Lying    Pulse:  102    Resp:   16   TempSrc:   Oral   Weight: 60.1 kg (132 lb 6.4 oz)   "   Height:   154.9 cm (61\")        FHT'S:                   Baseline:  110 BPM  Variability:  Moderate = 6 - 25 BPM  Accelerations:  15 x 15 accelerations present     Decelerations:  absent  Contractions:   absent     Interpretation:   Reactive NST, CAT 1 tracing        PHYSICAL EXAM:    General: well developed; well nourished  no acute distress   Heart: Not performed.   Lungs: breathing is unlabored     Abdomen: soft, non-tender; no masses  no umbilical or inguinal hernias are present       Cervix:  Exam by nurse, pooling present nitrazine positive  Cervical Dilation (cm): 3  Cervical Effacement: 50%  Fetal Station: -2  Cervical Consistency: soft  Cervical Position: mid-position   Contractions: none        Extremities: peripheral pulses normal, no pedal edema, no clubbing or cyanosis      LABS AND TESTING ORDERED:  1. Uterine and fetal monitoring  2. Nitrazine testing  3. Admission secondary to rupture of membranes    LAB RESULTS:    Recent Results (from the past 24 hour(s))   POC PH Fluid (Nitrazine)    Collection Time: 21  3:49 AM    Specimen: Body Fluid   Result Value Ref Range    pH, Fluid 7.00     Lot Number [id number]     Nitrazine Expiration Date 22        Lab Results   Component Value Date    ABO A 2020    RH Positive 2020       Lab Results   Component Value Date    STREPGPB Negative 2019           Assessment/Plan     ASSESSMENT/PLAN:  Julian Fleming is a 20 y.o. female  at 36w4d who presented with possible rupture of membranes.   She was evaluated for ROM and was found to have a Positive Nitrazine and Pooling present and her cervix was noted to be Cervical Dilation (cm): 3 cm/ Cervical Effacement: 50% % effaced/ vertex at Fetal Station: -2 station on last exam.  She was noted to have a Reactive NST.  CAT 1 tracing.  In view of these findings she will be admitted for delivery by Dr Epi Sneed MD, who will assume care and place orders at this time.      Final " Impression:  • Pregnancy at 36w4d  • Reactive NST.  CAT 1 tracing  • Confirmed ROM  with Positive Nitrazine and Pooling present  • Contractions: none  Lab Results   Component Value Date    ABO A 08/20/2020    RH Positive 08/20/2020   •   Lab Results   Component Value Date    STREPGPB Negative 03/29/2019   • Group B strep was submitted to lab yesterday and results are pending at this time  • COVID - 19 status pending    Plan:  · Patient will be admitted to Epi Sneed MD who will assume care of the patient at this time and provide further orders and management.  · GBS is pending at this time  · Patient is not meaghan  · Further orders for GBS and possible Pitocin per Dr. Naren SKELTON      I have spent 30 minutes including face to face time with the patient, greater than 50% in discussion of the diagnosis (counseling) and/or coordination of care.     Jovany Santiago MD  2/12/2021  04:07 EST  OB Hospitalist  Phone:    [phone]

## 2021-02-12 NOTE — L&D DELIVERY NOTE
Delivery Note    Obstetrician:   Donnie Mueller MD    Pre-Delivery Diagnosis: 36 4/7 weeks with PROM     Post-Delivery Diagnosis: Same    Procedure: Spontaneous vaginal delivery, epidural     This patient is a 20-year-old G2, P1 white female at 36 weeks 4 days gestation admitted with premature rupture of membranes and irregular contractions.  Patient was admitted and started on IV penicillin prophylaxis due to unknown GBS status and Pitocin augmentation.  She had epidural anesthesia and an IUPC was placed.  She progressed normally throughout the day and became completely dilated and pushed to +3 station undergoing a spontaneous sterile controlled vaginal delivery over an intact perineum of a live viable female infant weighing 6 pounds 11 ounces with Apgars of 8 and 8.  The infant was suctioned to clear fluid the cord doubly clamped and cut after 30 seconds and the infant handed to the mother in excellent condition.  Cord blood was obtained and the placenta was delivered spontaneously intact with 3 vessels and uterus was wiped clean.  There was a 136 cc measured blood loss and no complications and all counts were correct and both mother and infant did well immediately postpartum.  Maternal blood type is a positive rubella status is immune and she is bottlefeeding.    Episiotomy or Incision: none    Indications for instrumental delivery: none    Infant Wt:    6lbs,  11oz.      Female     Apgars: 1' 8  /  5' 8     Placenta and Cord:          Mechanism: spontaneous        Description:  3 vessel cord    Estimated Blood Loss:  136cc                             Complications:  None           Condition: Stable    Blood Type and Rh: A+      Rubella Immunity Status: Immune          Infant Feeding:    bottle -     Attending Attestation: I was present and scrubbed for the entire procedure.      2/12/2021  Donnie Mueller MD

## 2021-02-12 NOTE — NURSING NOTE
Dr. Mueller at bedside. Amniotomy performed by Dr. Mueller to rupture fore-bag. Clear, non-odorous fluid noted after. IUPC placed by Dr. Mueller, patient tolerated well- no blood return in catheter. r/v

## 2021-02-12 NOTE — NURSING NOTE
Dr. Mueller at bedside for patient evaluation. Discussed plan of care with patient, all questions answered. Patient states understanding.

## 2021-02-12 NOTE — PLAN OF CARE
"  Problem: Adult Inpatient Plan of Care  Goal: Plan of Care Review  Outcome: Ongoing, Progressing  Flowsheets  Taken 2021 by Oumou Jenkins, RN  Progress: improving  Outcome Summary:  @ 1550. Infant taken to NICU. Patient currently rating pain 0/10 with scnat to light bleeding with fundal checks.  Taken 2021 0642 by Winsome Vang, RN  Plan of Care Reviewed With:   patient   significant other  Goal: Patient-Specific Goal (Individualized)  Outcome: Ongoing, Progressing  Flowsheets (Taken 2021)  Patient-Specific Goals (Include Timeframe): Healthy mom and baby by dischrage  Individualized Care Needs:   2nd baby   bottle feeding  Anxieties, Fears or Concerns: \"not feeling good\"  Goal: Absence of Hospital-Acquired Illness or Injury  Outcome: Ongoing, Progressing  Goal: Optimal Comfort and Wellbeing  Outcome: Ongoing, Progressing  Goal: Readiness for Transition of Care  Outcome: Ongoing, Progressing   Goal Outcome Evaluation:     Progress: improving  Outcome Summary:  @ 1550. Infant taken to NICU. Patient currently rating pain 0/10 with scnat to light bleeding with fundal checks.  "

## 2021-02-12 NOTE — PLAN OF CARE
Goal Outcome Evaluation:  Plan of Care Reviewed With: patient, significant other     Outcome Summary: Healthy mom and baby

## 2021-02-12 NOTE — PROGRESS NOTES
Cervix now 60% effaced/3 cm dilated/-3 station and vertex.  Fore bag ruptured with clear fluid.  IUPC placed.  Fetal heart tones reactive.  Epidural working well.

## 2021-02-12 NOTE — NURSING NOTE
Dr. Mueller at bedside for patient evaluation. Plan of care discussed, all questions answered. Patient states understanding. SVE performed, 5/70/-2. Dr. Mueller states he will be by soon to see patient later.

## 2021-02-12 NOTE — ANESTHESIA PREPROCEDURE EVALUATION
Anesthesia Evaluation     Patient summary reviewed and Nursing notes reviewed   NPO Solid Status: > 8 hours  NPO Liquid Status: > 4 hours           Airway   Mallampati: II  Neck ROM: full  No difficulty expected  Dental - normal exam     Pulmonary     breath sounds clear to auscultation  Cardiovascular     Rhythm: regular        Neuro/Psych  (+) headaches,     GI/Hepatic/Renal/Endo      Musculoskeletal     Abdominal    Substance History      OB/GYN    (+) Pregnant,         Other                        Anesthesia Plan    ASA 2     epidural   (  36w4d  )    Anesthetic plan, all risks, benefits, and alternatives have been provided, discussed and informed consent has been obtained with: patient.

## 2021-02-13 LAB
BASOPHILS # BLD AUTO: 0.1 10*3/MM3 (ref 0–0.2)
BASOPHILS NFR BLD AUTO: 0.9 % (ref 0–1.5)
BUPRENORPHINE UR QL: NEGATIVE NG/ML
DEPRECATED RDW RBC AUTO: 39.7 FL (ref 37–54)
EOSINOPHIL # BLD AUTO: 0.07 10*3/MM3 (ref 0–0.4)
EOSINOPHIL NFR BLD AUTO: 0.6 % (ref 0.3–6.2)
ERYTHROCYTE [DISTWIDTH] IN BLOOD BY AUTOMATED COUNT: 13.5 % (ref 12.3–15.4)
HCT VFR BLD AUTO: 29 % (ref 34–46.6)
HGB BLD-MCNC: 9 G/DL (ref 12–15.9)
IMM GRANULOCYTES # BLD AUTO: 0.08 10*3/MM3 (ref 0–0.05)
IMM GRANULOCYTES NFR BLD AUTO: 0.7 % (ref 0–0.5)
LYMPHOCYTES # BLD AUTO: 2.18 10*3/MM3 (ref 0.7–3.1)
LYMPHOCYTES NFR BLD AUTO: 18.7 % (ref 19.6–45.3)
MCH RBC QN AUTO: 25.1 PG (ref 26.6–33)
MCHC RBC AUTO-ENTMCNC: 31 G/DL (ref 31.5–35.7)
MCV RBC AUTO: 81 FL (ref 79–97)
MONOCYTES # BLD AUTO: 0.95 10*3/MM3 (ref 0.1–0.9)
MONOCYTES NFR BLD AUTO: 8.2 % (ref 5–12)
NEUTROPHILS NFR BLD AUTO: 70.9 % (ref 42.7–76)
NEUTROPHILS NFR BLD AUTO: 8.25 10*3/MM3 (ref 1.7–7)
NRBC BLD AUTO-RTO: 0 /100 WBC (ref 0–0.2)
PLATELET # BLD AUTO: 244 10*3/MM3 (ref 140–450)
PMV BLD AUTO: 11.8 FL (ref 6–12)
RBC # BLD AUTO: 3.58 10*6/MM3 (ref 3.77–5.28)
WBC # BLD AUTO: 11.63 10*3/MM3 (ref 3.4–10.8)

## 2021-02-13 PROCEDURE — 85025 COMPLETE CBC W/AUTO DIFF WBC: CPT | Performed by: OBSTETRICS & GYNECOLOGY

## 2021-02-13 PROCEDURE — 0503F POSTPARTUM CARE VISIT: CPT | Performed by: OBSTETRICS & GYNECOLOGY

## 2021-02-13 RX ORDER — ACETAMINOPHEN 325 MG/1
650 TABLET ORAL EVERY 4 HOURS PRN
Status: DISCONTINUED | OUTPATIENT
Start: 2021-02-13 | End: 2021-02-14 | Stop reason: HOSPADM

## 2021-02-13 RX ADMIN — Medication 1 TABLET: at 08:52

## 2021-02-13 RX ADMIN — DOCUSATE SODIUM 100 MG: 100 CAPSULE, LIQUID FILLED ORAL at 20:19

## 2021-02-13 RX ADMIN — ACETAMINOPHEN 650 MG: 325 TABLET, FILM COATED ORAL at 19:02

## 2021-02-13 RX ADMIN — DOCUSATE SODIUM 100 MG: 100 CAPSULE, LIQUID FILLED ORAL at 08:52

## 2021-02-13 NOTE — PROGRESS NOTES
DAILY PROGRESS NOTE    Patient Identification:  Name: Julian Fleming  Age: 20 y.o.  Sex: female  :  2000  MRN: [id number]               Subjective:  Interval History: Postpartum day #1 from a vaginal delivery.  The patient is feeling well.  Pain is controlled with pain medication.  Lochia is minimal.    Objective:    Scheduled Meds:docusate sodium, 100 mg, Oral, BID  prenatal vitamin, 1 tablet, Oral, Daily      Continuous Infusions:   PRN Meds:•  benzocaine  •  benzocaine-menthol  •  bisacodyl  •  pramoxine-hydrocortisone  •  Hydrocortisone (Perianal)  •  hydrOXYzine  •  ibuprofen  •  magnesium hydroxide  •  ondansetron  •  ondansetron  •  oxyCODONE-acetaminophen **OR** oxyCODONE-acetaminophen  •  sodium chloride    Vital signs in last 24 hours:  Temp:  [96.7 °F (35.9 °C)-98.4 °F (36.9 °C)] 97.5 °F (36.4 °C)  Heart Rate:  [] 82  Resp:  [16-20] 16  BP: ()/(33-83) 118/74    Intake/Output:    Intake/Output Summary (Last 24 hours) at 2021 1420  Last data filed at 2021 2200  Gross per 24 hour   Intake 2732.61 ml   Output 2461 ml   Net 271.61 ml       Exam:  General Appearance:    Alert, cooperative, no distress, appears stated age   Lungs:     Clear to auscultation bilaterally, respirations unlabored    Heart:    Regular rate and rhythm, S1 and S2 normal, no murmur, rub   or gallop   Abdomen:    Soft, nondistended.  The fundus is firm and nontender.  It is below the umbilicus.   Extremities:  Equal in size with minimal pedal edema   Skin:   Skin color, texture, turgor normal, no rashes or lesions        Data Review:    Lab Results (last 24 hours)     Procedure Component Value Units Date/Time    CBC & Differential [[id number]]  (Abnormal) Collected: 21    Specimen: Blood Updated: 21    Narrative:      The following orders were created for panel order CBC & Differential.  Procedure                               Abnormality         Status                     ---------                                -----------         ------                     CBC Auto Differential[[id number]]        Abnormal            Final result                 Please view results for these tests on the individual orders.    CBC Auto Differential [[id number]]  (Abnormal) Collected: 02/13/21 0725    Specimen: Blood Updated: 02/13/21 0809     WBC 11.63 10*3/mm3      RBC 3.58 10*6/mm3      Hemoglobin 9.0 g/dL      Hematocrit 29.0 %      MCV 81.0 fL      MCH 25.1 pg      MCHC 31.0 g/dL      RDW 13.5 %      RDW-SD 39.7 fl      MPV 11.8 fL      Platelets 244 10*3/mm3      Neutrophil % 70.9 %      Lymphocyte % 18.7 %      Monocyte % 8.2 %      Eosinophil % 0.6 %      Basophil % 0.9 %      Immature Grans % 0.7 %      Neutrophils, Absolute 8.25 10*3/mm3      Lymphocytes, Absolute 2.18 10*3/mm3      Monocytes, Absolute 0.95 10*3/mm3      Eosinophils, Absolute 0.07 10*3/mm3      Basophils, Absolute 0.10 10*3/mm3      Immature Grans, Absolute 0.08 10*3/mm3      nRBC 0.0 /100 WBC     URINE DRUG SCREEN PLUS BUPRENORPHINE - [[id number]] Collected: 02/12/21 1606     Updated: 02/12/21 1709    Narrative:      The following orders were created for panel order URINE DRUG SCREEN PLUS BUPRENORPHINE -.  Procedure                               Abnormality         Status                     ---------                               -----------         ------                     Urine Drug Screen - Urin...[[id number]]  Normal              Final result               Buprenorphine Screen Uri...[[id number]]                      In process                   Please view results for these tests on the individual orders.    Urine Drug Screen - Urine, Clean Catch [[id number]]  (Normal) Collected: 02/12/21 1606    Specimen: Urine, Clean Catch Updated: 02/12/21 1709     Amphet/Methamphet, Screen Negative     Barbiturates Screen, Urine Negative     Benzodiazepine Screen, Urine Negative     Cocaine Screen, Urine Negative     Opiate Screen Negative     THC,  Screen, Urine Negative     Methadone Screen, Urine Negative     Oxycodone Screen, Urine Negative    Narrative:      Negative Thresholds For Drugs Screened:     Amphetamines               500 ng/ml   Barbiturates               200 ng/ml   Benzodiazepines            100 ng/ml   Cocaine                    300 ng/ml   Methadone                  300 ng/ml   Opiates                    300 ng/ml   Oxycodone                  100 ng/ml   THC                        50 ng/ml    The Normal Value for all drugs tested is negative. This report includes final unconfirmed screening results to be used for medical treatment purposes only. Unconfirmed results must not be used for non-medical purposes such as employment or legal testing. Clinical consideration should be applied to any drug of abuse test, particulary when unconfirmed results are used.    Buprenorphine Screen Urine - Urine, Clean Catch [[id number]] Collected: 02/12/21 1606    Specimen: Urine, Clean Catch Updated: 02/12/21 1639        Assessment:    Pregnancy    Appropriate progress, postpartum day #1        Cameron Felix MD  2/13/2021

## 2021-02-13 NOTE — PLAN OF CARE
Goal Outcome Evaluation:  Plan of Care Reviewed With: patient VS stable. Voids without difficulty. Fundus firm and midline. No excessive lochia. Pt visited NICU today and appears appropriate when  discussing infant. CBC stable. Continue postpartum care.

## 2021-02-13 NOTE — PLAN OF CARE
Goal Outcome Evaluation:      VS and bleeding stable. Perineum swollen, but intact. Pain controlled with Motrin. Visited  in NICU.

## 2021-02-13 NOTE — LACTATION NOTE
This note was copied from a baby's chart.  NICU nurse reports that infant is exclusively formula fed and there are no plans to breastfeed. Will remove from lactation list at this time.

## 2021-02-14 VITALS
HEIGHT: 61 IN | OXYGEN SATURATION: 97 % | HEART RATE: 50 BPM | DIASTOLIC BLOOD PRESSURE: 62 MMHG | RESPIRATION RATE: 16 BRPM | SYSTOLIC BLOOD PRESSURE: 97 MMHG | WEIGHT: 132.4 LBS | TEMPERATURE: 99.7 F | BODY MASS INDEX: 25 KG/M2

## 2021-02-14 PROCEDURE — 0503F POSTPARTUM CARE VISIT: CPT | Performed by: OBSTETRICS & GYNECOLOGY

## 2021-02-14 RX ADMIN — Medication 1 TABLET: at 09:30

## 2021-02-14 RX ADMIN — DOCUSATE SODIUM 100 MG: 100 CAPSULE, LIQUID FILLED ORAL at 09:30

## 2021-02-14 NOTE — DISCHARGE SUMMARY
Date of Discharge:  2/14/2021    Discharge Diagnosis: 36 weeks 4 days gestation with premature rupture membranes status post spontaneous vaginal delivery    Presenting Problem/History of Present Illness  Pregnancy [Z34.90]       Hospital Course  Patient is a 20 y.o. female G2, P1 at 36 weeks 4 days gestation admitted with premature rupture membranes.  Patient started on penicillin prophylaxis for GBS unknown status and Pitocin augmentation and eventually underwent a spontaneous control vaginal delivery over an intact perineum of a live viable female infant weighing 6 pounds 11 ounces with Apgars of 8 and 8..    Patient is desiring discharge today on her second postpartum day with her hemoglobin stable at 9.0, down slightly from admission of 9.2.  Her blood type is a positive and rubella status is immune.  She will remain on her prenatal vitamins with iron for a month.    Procedures Performed         Consults:   Consults     No orders found from 1/14/2021 to 2/13/2021.          Pertinent Test Results: As above    Condition on Discharge: Stable    Vital Signs  Temp:  [98 °F (36.7 °C)-99.7 °F (37.6 °C)] 99.7 °F (37.6 °C)  Heart Rate:  [50-68] 50  Resp:  [16] 16  BP: ()/(62-77) 97/62    Physical Exam:   Vital signs stable.  Afebrile.  Lochia scant.    Discharge Disposition  Home    Discharge Medications     Discharge Medications      ASK your doctor about these medications      Instructions Start Date   ferrous sulfate 325 (65 FE) MG tablet   325 mg, Oral, Every Other Day      nitrofurantoin (macrocrystal-monohydrate) 100 MG capsule  Commonly known as: Macrobid   100 mg, Oral, Nightly      PRENATAL GUMMY VITAMIN   1 each, Oral, Daily             Discharge Diet:   Regular  Activity at Discharge:   As tolerated  Follow-up Appointments  Future Appointments   Date Time Provider Department Center   2/18/2021  3:00 PM Pat Crowell MD [facility]         Test Results Pending at Discharge       Donnie Mueller,  MD  02/14/21  10:37 EST    Time: 10:41 AM.

## 2021-02-14 NOTE — PLAN OF CARE
Goal Outcome Evaluation:   VS and bleeding stable. No c/o of pain requiring meds. this shift. Visited  in NICU. Plans to bottle feed when  ready.

## 2021-02-15 LAB — B-HEM STREP SPEC QL CULT: NEGATIVE

## 2021-02-17 NOTE — PAYOR COMM NOTE
"Meg Fleming (20 y.o. Female)     ATTN: NURSE REVIEWER  RE : DISCHARGE NOTIFICATION  REF#[id number]  PLS REPLY TO [facility] [phone] FAX# [phone]      Date of Birth Social Security Number Address Home Phone MRN    2000  [address] [id number]  [id number]    S[employer] Marital Status          None Single       Admission Date Admission Type Admitting Provider Attending Provider Department, Room/Bed    2/12/21 Emergency Epi Sneed MD  [address], [address]/1    Discharge Date Discharge Disposition Discharge Destination        2/14/2021 Home or Self Care              Attending Provider: (none)   Allergies: Cefzil [Cefprozil]    Isolation: None   Infection: None   Code Status: Prior    Ht: 154.9 cm (61\")   Wt: 60.1 kg (132 lb 6.4 oz)    Admission Cmt: None   Principal Problem: None                Active Insurance as of 2/12/2021     Primary Coverage     Payor Plan Insurance Group Employer/Plan Group    [place] [id number]     Payor Plan Address Payor Plan Phone Number Payor Plan Fax Number Effective Dates    [employer] [address] [address] [phone]  1/1/2020 - None Entered    [facility] [id number]       Subscriber Name Subscriber Birth Date Member ID       MEG FLEMING 2000 [id number]                 Emergency Contacts      (Rel.) Home Phone Work Phone Mobile Phone    Renee Cee (Mother) [phone] -- [phone]               Discharge Summary      Donnie Mueller MD at 02/14/21 1037              Date of Discharge:  2/14/2021    Discharge Diagnosis: 36 weeks 4 days gestation with premature rupture membranes status post spontaneous vaginal delivery    Presenting Problem/History of Present Illness  Pregnancy [Z34.90]       Hospital Course  Patient is a 20 y.o. female G2, P1 at 36 weeks 4 days gestation admitted with premature rupture membranes.  Patient started on penicillin prophylaxis for GBS unknown " status and Pitocin augmentation and eventually underwent a spontaneous control vaginal delivery over an intact perineum of a live viable female infant weighing 6 pounds 11 ounces with Apgars of 8 and 8..    Patient is desiring discharge today on her second postpartum day with her hemoglobin stable at 9.0, down slightly from admission of 9.2.  Her blood type is a positive and rubella status is immune.  She will remain on her prenatal vitamins with iron for a month.    Procedures Performed         Consults:   Consults     No orders found from 1/14/2021 to 2/13/2021.          Pertinent Test Results: As above    Condition on Discharge: Stable    Vital Signs  Temp:  [98 °F (36.7 °C)-99.7 °F (37.6 °C)] 99.7 °F (37.6 °C)  Heart Rate:  [50-68] 50  Resp:  [16] 16  BP: ()/(62-77) 97/62    Physical Exam:   Vital signs stable.  Afebrile.  Lochia scant.    Discharge Disposition  Home    Discharge Medications     Discharge Medications      ASK your doctor about these medications      Instructions Start Date   ferrous sulfate 325 (65 FE) MG tablet   325 mg, Oral, Every Other Day      nitrofurantoin (macrocrystal-monohydrate) 100 MG capsule  Commonly known as: Macrobid   100 mg, Oral, Nightly      PRENATAL GUMMY VITAMIN   1 each, Oral, Daily             Discharge Diet:   Regular  Activity at Discharge:   As tolerated  Follow-up Appointments  Future Appointments   Date Time Provider Department Center   2/18/2021  3:00 PM Pat Crowell MD [facility]         Test Results Pending at Discharge       Donnie Mueller MD  02/14/21  10:37 EST    Time: 10:41 AM.          Electronically signed by Donnie Mueller MD at 02/14/21 4352

## 2021-03-22 ENCOUNTER — POSTPARTUM VISIT (OUTPATIENT)
Dept: OBSTETRICS AND GYNECOLOGY | Facility: CLINIC | Age: 21
End: 2021-03-22

## 2021-03-22 VITALS
HEIGHT: 61 IN | HEART RATE: 92 BPM | BODY MASS INDEX: 19.83 KG/M2 | DIASTOLIC BLOOD PRESSURE: 77 MMHG | SYSTOLIC BLOOD PRESSURE: 109 MMHG | WEIGHT: 105 LBS

## 2021-03-22 PROCEDURE — 0503F POSTPARTUM CARE VISIT: CPT | Performed by: OBSTETRICS & GYNECOLOGY

## 2021-03-22 PROCEDURE — 81025 URINE PREGNANCY TEST: CPT | Performed by: OBSTETRICS & GYNECOLOGY

## 2021-03-22 RX ORDER — SODIUM CHLORIDE 0.9 % (FLUSH) 0.9 %
3 SYRINGE (ML) INJECTION EVERY 12 HOURS SCHEDULED
Status: CANCELLED | OUTPATIENT
Start: 2021-05-13

## 2021-03-22 RX ORDER — SODIUM CHLORIDE 0.9 % (FLUSH) 0.9 %
10 SYRINGE (ML) INJECTION AS NEEDED
Status: CANCELLED | OUTPATIENT
Start: 2021-05-13

## 2021-03-22 RX ORDER — NORGESTIMATE AND ETHINYL ESTRADIOL 0.25-0.035
1 KIT ORAL DAILY
Qty: 84 TABLET | Refills: 4 | Status: SHIPPED | OUTPATIENT
Start: 2021-03-22 | End: 2021-05-11

## 2021-03-22 NOTE — PROGRESS NOTES
"Chief Complaint   Patient presents with   • Postpartum Care     Pt desires tubal ligation and will like OCP to bridge surgery.         SUBJECTIVE:   Julian Fleming is a 20 y.o.  who presents s/p  Spontaneous Vaginal Delivery on 21 at 36 weeks.  Reports no major issues.  Has some numbness in her rectum when she has a bowel movement.  Denies any pain, bleeding, itching.  Denies any fecal incontinence  Bowel and bladder function have otherwise returned to normal  Mood changes none  bottle feeding    OB History    Para Term  AB Living   2 2 1 1   2   SAB TAB Ectopic Molar Multiple Live Births           0 2      # Outcome Date GA Lbr Frandy/2nd Weight Sex Delivery Anes PTL Lv   2  21 36w4d 15:43 / 00:37 3032 g (6 lb 11 oz) F Vag-Spont EPI Y SHMUEL      Birth Comments: scale 1   1 Term 19 39w6d 05:59 / 04:21 3217 g (7 lb 1.5 oz) M Vag-Spont EPI N SHMUEL      Birth Comments: scale 4          Past Medical History:   Diagnosis Date   • Anemia    • Chlamydia    • Migraine    • Urinary tract infection      History reviewed. No pertinent surgical history.  Social History     Tobacco Use   • Smoking status: Never Smoker   • Smokeless tobacco: Never Used   Substance Use Topics   • Alcohol use: No     Comment: quit when finding out was pregnant   • Drug use: No     Family History   Problem Relation Age of Onset   • Breast cancer Neg Hx    • Ovarian cancer Neg Hx    • Uterine cancer Neg Hx    • Colon cancer Neg Hx    • Deep vein thrombosis Neg Hx    • Pulmonary embolism Neg Hx        Patient Active Problem List   Diagnosis   • Migraine   • Pyelonephritis affecting pregnancy in second trimester   • Pregnancy        OBJECTIVE:   /77   Pulse 92   Ht 154.9 cm (60.98\")   Wt 47.6 kg (105 lb)   LMP 2020 (Within Days)   Breastfeeding No   BMI 19.85 kg/m²    Physical Examination:   General appearance - alert, well appearing, and in no distress  Breasts - declined  Chest - no tachypnea, " retractions or cyanosis  Heart - normal rate and regular rhythm  Abdomen - soft, nontender, nondistended, no masses or organomegaly;   Pelvic - normal external genitalia, vulva, vagina, cervix, uterus and adnexa, no bleeding  Neurological - screening mental status exam normal  Musculoskeletal - no joint tenderness, deformity or swelling  Psychiatric - normal mood and affect    Lab Results   Component Value Date    WBC 11.63 (H) 2021    HGB 9.0 (L) 2021    HCT 29.0 (L) 2021    MCV 81.0 2021     2021        ASSESSMENT:   S/p   Desires oral contraceptive pill bridge to sterilization    PLAN:   Doing well postpartum  Baby girl doing well  Contraception: desires oral contraceptive pill bridge to bilateral salpingectomy.  Patient was counseled on the risks of oral contraceptive pills including but not limited to nausea, vomiting, hypertension, headache, increased risk of venous thromboembolism.  She is free of contraindication.  She was also counseled on the benefits and risks of permanent sterilization and the different manners of permanent sterilization.  She was specifically counseled on the risk of regret giving her young age.  She is adamant that she denies no future childbearing.  She would like to proceed with laparoscopic bilateral salpingectomy and is aware that this is removal of both tubes usually requiring 3 incisions on the abdomen.  She was encouraged if a side effect is arise she should stop the medication and seek medical attention. Tubal papers signed Dec 2020    At this time, may resume normal activity including intercourse and lifting.  Reviewed normal precautions.  Reviewed last pap smear Aug 2020 NIL,   Recommended follow up for annual exam or sooner with problems

## 2021-04-16 ENCOUNTER — BULK ORDERING (OUTPATIENT)
Dept: CASE MANAGEMENT | Facility: OTHER | Age: 21
End: 2021-04-16

## 2021-04-16 DIAGNOSIS — Z23 IMMUNIZATION DUE: ICD-10-CM

## 2021-05-11 ENCOUNTER — LAB (OUTPATIENT)
Dept: LAB | Facility: HOSPITAL | Age: 21
End: 2021-05-11

## 2021-05-11 ENCOUNTER — PRE-ADMISSION TESTING (OUTPATIENT)
Dept: PREADMISSION TESTING | Facility: HOSPITAL | Age: 21
End: 2021-05-11

## 2021-05-11 LAB
HCG SERPL QL: NEGATIVE
SARS-COV-2 ORF1AB RESP QL NAA+PROBE: NOT DETECTED

## 2021-05-11 PROCEDURE — C9803 HOPD COVID-19 SPEC COLLECT: HCPCS | Performed by: NURSE PRACTITIONER

## 2021-05-11 PROCEDURE — U0004 COV-19 TEST NON-CDC HGH THRU: HCPCS | Performed by: NURSE PRACTITIONER

## 2021-05-11 PROCEDURE — 85025 COMPLETE CBC W/AUTO DIFF WBC: CPT | Performed by: OBSTETRICS & GYNECOLOGY

## 2021-05-11 PROCEDURE — 84703 CHORIONIC GONADOTROPIN ASSAY: CPT

## 2021-05-11 RX ORDER — CHLORHEXIDINE GLUCONATE 500 MG/1
1 CLOTH TOPICAL TAKE AS DIRECTED
COMMUNITY
End: 2021-05-27

## 2021-05-11 NOTE — DISCHARGE INSTRUCTIONS
CHLORHEXIDINE CLOTH INSTRUCTIONS  The morning of surgery follow these instructions using the Chlorhexidine cloths you've been given.  These steps reduce bacteria on the body.  Do not use the cloths near your eyes, ears mouth, genitalia or on open wounds.  Throw the cloths away after use but do not try to flush them down a toilet.      • Open and remove one cloth at a time from the package.    • Leave the cloth unfolded and begin the bathing.  • Massage the skin with the cloths using gentle pressure to remove bacteria.  Do not scrub harshly.   • Follow the steps below with one 2% CHG cloth per area (6 total cloths).  • One cloth for neck, shoulders and chest.  • One cloth for both arms, hands, fingers and underarms (do underarms last).  • One cloth for the abdomen followed by groin.  • One cloth for right leg and foot including between the toes.  • One cloth for left leg and foot including between the toes.  • The last cloth is to be used for the back of the neck, back and buttocks.    Allow the CHG to air dry 3 minutes on the skin which will give it time to work and decrease the chance of irritation.  The skin may feel sticky until it is dry.  Do not rinse with water or any other liquid or you will lose the beneficial effects of the CHG.  If mild skin irritation occurs, do rinse the skin to remove the CHG.  Report this to the nurse at time of admission.  Do not apply lotions, creams, ointments, deodorants or perfumes after using the clothes. Dress in clean clothes before coming to the hospital.  Take the following medications the morning of surgery: NONE   ARRIVAL TO OUTPATIENT SURGERY ON MAY 13, 2021 AT 0545AM      If you are on prescription narcotic pain medication to control your pain you may also take that medication the morning of surgery.    General Instructions:  • Do not eat solid food after midnight the night before surgery.  • You may drink clear liquids day of surgery but must stop at least one hour  before your hospital arrival time.  • It is beneficial for you to have a clear drink that contains carbohydrates the day of surgery.  We suggest a 12 to 20 ounce bottle of Gatorade or Powerade for non-diabetic patients or a 12 to 20 ounce bottle of G2 or Powerade Zero for diabetic patients. (Pediatric patients, are not advised to drink a 12 to 20 ounce carbohydrate drink)    Clear liquids are liquids you can see through.  Nothing red in color.     Plain water                               Sports drinks  Sodas                                   Gelatin (Jell-O)  Fruit juices without pulp such as white grape juice and apple juice  Popsicles that contain no fruit or yogurt  Tea or coffee (no cream or milk added)  Gatorade / Powerade  G2 / Powerade Zero  .   • Patients who avoid smoking, chewing tobacco and alcohol for 4 weeks prior to surgery have a reduced risk of post-operative complications.  Quit smoking as many days before surgery as you can.  • Do not smoke, use chewing tobacco or drink alcohol the day of surgery.   • If applicable bring your C-PAP/ BI-PAP machine.  • Bring any papers given to you in the doctor’s office.  • Wear clean comfortable clothes.  • Do not wear contact lenses, false eyelashes or make-up.  Bring a case for your glasses.   • Remove all piercings.  Leave jewelry and any other valuables at home.  • Hair extensions with metal clips must be removed prior to surgery.  • The Pre-Admission Testing nurse will instruct you to bring medications if unable to obtain an accurate list in Pre-Admission Testing.          Preventing a Surgical Site Infection:  • For 2 to 3 days before surgery, avoid shaving with a razor because the razor can irritate skin and make it easier to develop an infection.    • Any areas of open skin can increase the risk of a post-operative wound infection by allowing bacteria to enter and travel throughout the body.  Notify your surgeon if you have any skin wounds / rashes even  if it is not near the expected surgical site.  The area will need assessed to determine if surgery should be delayed until it is healed.  • The night prior to surgery shower using a fresh bar of anti-bacterial soap (such as Dial) and clean washcloth.  Sleep in a clean bed with clean clothing.  Do not allow pets to sleep with you.  • Shower on the morning of surgery using a fresh bar of anti-bacterial soap (such as Dial) and clean washcloth.  Dry with a clean towel and dress in clean clothing.  • Ask your surgeon if you will be receiving antibiotics prior to surgery.  • Make sure you, your family, and all healthcare providers clean their hands with soap and water or an alcohol based hand  before caring for you or your wound.    Day of surgery:  Your arrival time is approximately two hours before your scheduled surgery time.  Upon arrival, a Pre-op nurse and Anesthesiologist will review your health history, obtain vital signs, and answer questions you may have.  The only belongings needed at this time will be a list of your home medications and if applicable your C-PAP/BI-PAP machine.  A Pre-op nurse will start an IV and you may receive medication in preparation for surgery, including something to help you relax.     Please be aware that surgery does come with discomfort.  We want to make every effort to control your discomfort so please discuss any uncontrolled symptoms with your nurse.   Your doctor will most likely have prescribed pain medications.      If you are going home after surgery you will receive individualized written care instructions before being discharged.  A responsible adult must drive you to and from the hospital on the day of your surgery and stay with you for 24 hours.  Discharge prescriptions can be filled by the hospital pharmacy during regular pharmacy hours.  If you are having surgery late in the day/evening your prescription may be e-prescribed to your pharmacy.  Please verify your  pharmacy hours or chose a 24 hour pharmacy to avoid not having access to your prescription because your pharmacy has closed for the day.    If you are staying overnight following surgery, you will be transported to your hospital room following the recovery period.  [facility] has all private rooms.    If you have any questions please call Pre-Admission Testing at [phone].  Deductibles and co-payments are collected on the day of service. Please be prepared to pay the required co-pay, deductible or deposit on the day of service as defined by your plan.    Patient Education for Self-Quarantine Process    Following your COVID testing, we strongly recommend that you do not leave your home after you have been tested for COVID except to get medical care. This includes not going to work, school or to public areas.  If this is not possible for you to do please limit your activities to only required outings.  Be sure to wear a mask when you are with other people, practice social distancing and wash your hands frequently.      The following items provide additional details to keep you safe.  • Wash your hands with soap and water frequently for at least 20 seconds.   • Avoid touching your eyes, nose and mouth with unwashed hands.  • Do not share anything - utensils, towels, food from the same bowl.   • Have your own utensils, drinking glass, dishes, towels and bedding.   • Do not have visitors.   • Do use FaceTime to stay in touch with family and friends.  • You should stay in a specific room away from others if possible.   • Stay at least 6 feet away from others in the home if you cannot have a dedicated room to yourself.   • Do not snuggle with your pet. While the CDC says there is no evidence that pets can spread COVID-19 or be infected from humans, it is probably best to avoid “petting, snuggling, being kissed or licked and sharing food (during self-quarantine)”, according to the CDC.   • Sanitize  household surfaces daily. Include all high touch areas (door handles, light switches, phones, countertops, etc.)  • Do not share a bathroom with others, if possible.   • Wear a mask around others in your home if you are unable to stay in a separate room or 6 feet apart. If  you are unable to wear a mask, have your family member wear a mask if they must be within 6 feet of you.   Call your surgeon immediately if you experience any of the following symptoms:  • Sore Throat  • Shortness of Breath or difficulty breathing  • Cough  • Chills  • Body soreness or muscle pain  • Headache  • Fever  • New loss of taste or smell  • Do not arrive for your surgery ill.  Your procedure will need to be rescheduled to another time.  You will need to call your physician before the day of surgery to avoid any unnecessary exposure to hospital staff as well as other patients.

## 2021-05-13 ENCOUNTER — ANESTHESIA (OUTPATIENT)
Dept: PERIOP | Facility: HOSPITAL | Age: 21
End: 2021-05-13

## 2021-05-13 ENCOUNTER — HOSPITAL ENCOUNTER (OUTPATIENT)
Facility: HOSPITAL | Age: 21
Setting detail: HOSPITAL OUTPATIENT SURGERY
Discharge: HOME | End: 2021-05-13
Attending: OBSTETRICS & GYNECOLOGY | Admitting: OBSTETRICS & GYNECOLOGY

## 2021-05-13 ENCOUNTER — ANESTHESIA EVENT (OUTPATIENT)
Dept: PERIOP | Facility: HOSPITAL | Age: 21
End: 2021-05-13

## 2021-05-13 VITALS
BODY MASS INDEX: 19.46 KG/M2 | HEART RATE: 63 BPM | TEMPERATURE: 97.5 F | RESPIRATION RATE: 16 BRPM | OXYGEN SATURATION: 100 % | WEIGHT: 102.95 LBS | DIASTOLIC BLOOD PRESSURE: 58 MMHG | SYSTOLIC BLOOD PRESSURE: 107 MMHG

## 2021-05-13 DIAGNOSIS — Z30.2 STERILIZATION: ICD-10-CM

## 2021-05-13 PROCEDURE — 25010000002 ONDANSETRON PER 1 MG: Performed by: NURSE ANESTHETIST, CERTIFIED REGISTERED

## 2021-05-13 PROCEDURE — S0260 H&P FOR SURGERY: HCPCS | Performed by: OBSTETRICS & GYNECOLOGY

## 2021-05-13 PROCEDURE — 25010000002 PROPOFOL 10 MG/ML EMULSION: Performed by: NURSE ANESTHETIST, CERTIFIED REGISTERED

## 2021-05-13 PROCEDURE — 25010000002 KETOROLAC TROMETHAMINE PER 15 MG: Performed by: NURSE ANESTHETIST, CERTIFIED REGISTERED

## 2021-05-13 PROCEDURE — 25010000002 FENTANYL CITRATE (PF) 100 MCG/2ML SOLUTION: Performed by: NURSE ANESTHETIST, CERTIFIED REGISTERED

## 2021-05-13 PROCEDURE — 88302 TISSUE EXAM BY PATHOLOGIST: CPT | Performed by: OBSTETRICS & GYNECOLOGY

## 2021-05-13 PROCEDURE — 25010000002 NEOSTIGMINE 5 MG/10ML SOLUTION: Performed by: NURSE ANESTHETIST, CERTIFIED REGISTERED

## 2021-05-13 PROCEDURE — 25010000002 HYDROMORPHONE PER 4 MG: Performed by: NURSE ANESTHETIST, CERTIFIED REGISTERED

## 2021-05-13 PROCEDURE — 58661 LAPAROSCOPY REMOVE ADNEXA: CPT | Performed by: OBSTETRICS & GYNECOLOGY

## 2021-05-13 PROCEDURE — 25010000002 DEXAMETHASONE PER 1 MG: Performed by: NURSE ANESTHETIST, CERTIFIED REGISTERED

## 2021-05-13 RX ORDER — FENTANYL CITRATE 50 UG/ML
INJECTION, SOLUTION INTRAMUSCULAR; INTRAVENOUS AS NEEDED
Status: DISCONTINUED | OUTPATIENT
Start: 2021-05-13 | End: 2021-05-13 | Stop reason: SURG

## 2021-05-13 RX ORDER — SCOLOPAMINE TRANSDERMAL SYSTEM 1 MG/1
1 PATCH, EXTENDED RELEASE TRANSDERMAL ONCE
Status: DISCONTINUED | OUTPATIENT
Start: 2021-05-13 | End: 2021-05-13 | Stop reason: HOSPADM

## 2021-05-13 RX ORDER — ENALAPRILAT 2.5 MG/2ML
1.25 INJECTION INTRAVENOUS ONCE AS NEEDED
Status: DISCONTINUED | OUTPATIENT
Start: 2021-05-13 | End: 2021-05-13 | Stop reason: HOSPADM

## 2021-05-13 RX ORDER — KETOROLAC TROMETHAMINE 30 MG/ML
INJECTION, SOLUTION INTRAMUSCULAR; INTRAVENOUS AS NEEDED
Status: DISCONTINUED | OUTPATIENT
Start: 2021-05-13 | End: 2021-05-13 | Stop reason: SURG

## 2021-05-13 RX ORDER — HYDROMORPHONE HCL 110MG/55ML
PATIENT CONTROLLED ANALGESIA SYRINGE INTRAVENOUS AS NEEDED
Status: DISCONTINUED | OUTPATIENT
Start: 2021-05-13 | End: 2021-05-13 | Stop reason: SURG

## 2021-05-13 RX ORDER — FENTANYL CITRATE 50 UG/ML
50 INJECTION, SOLUTION INTRAMUSCULAR; INTRAVENOUS
Status: DISCONTINUED | OUTPATIENT
Start: 2021-05-13 | End: 2021-05-13 | Stop reason: HOSPADM

## 2021-05-13 RX ORDER — SODIUM CHLORIDE 0.9 % (FLUSH) 0.9 %
3 SYRINGE (ML) INJECTION EVERY 12 HOURS SCHEDULED
Status: DISCONTINUED | OUTPATIENT
Start: 2021-05-13 | End: 2021-05-13 | Stop reason: HOSPADM

## 2021-05-13 RX ORDER — SODIUM CHLORIDE 0.9 % (FLUSH) 0.9 %
3-10 SYRINGE (ML) INJECTION AS NEEDED
Status: DISCONTINUED | OUTPATIENT
Start: 2021-05-13 | End: 2021-05-13 | Stop reason: HOSPADM

## 2021-05-13 RX ORDER — OXYCODONE AND ACETAMINOPHEN 7.5; 325 MG/1; MG/1
1 TABLET ORAL ONCE AS NEEDED
Status: DISCONTINUED | OUTPATIENT
Start: 2021-05-13 | End: 2021-05-13 | Stop reason: HOSPADM

## 2021-05-13 RX ORDER — SODIUM CHLORIDE, SODIUM LACTATE, POTASSIUM CHLORIDE, CALCIUM CHLORIDE 600; 310; 30; 20 MG/100ML; MG/100ML; MG/100ML; MG/100ML
9 INJECTION, SOLUTION INTRAVENOUS CONTINUOUS
Status: DISCONTINUED | OUTPATIENT
Start: 2021-05-13 | End: 2021-05-13 | Stop reason: HOSPADM

## 2021-05-13 RX ORDER — DEXAMETHASONE SODIUM PHOSPHATE 10 MG/ML
INJECTION INTRAMUSCULAR; INTRAVENOUS AS NEEDED
Status: DISCONTINUED | OUTPATIENT
Start: 2021-05-13 | End: 2021-05-13 | Stop reason: SURG

## 2021-05-13 RX ORDER — SODIUM CHLORIDE 0.9 % (FLUSH) 0.9 %
10 SYRINGE (ML) INJECTION AS NEEDED
Status: DISCONTINUED | OUTPATIENT
Start: 2021-05-13 | End: 2021-05-13 | Stop reason: HOSPADM

## 2021-05-13 RX ORDER — SODIUM CHLORIDE 9 MG/ML
INJECTION, SOLUTION INTRAVENOUS AS NEEDED
Status: DISCONTINUED | OUTPATIENT
Start: 2021-05-13 | End: 2021-05-13 | Stop reason: HOSPADM

## 2021-05-13 RX ORDER — NEOSTIGMINE METHYLSULFATE 0.5 MG/ML
INJECTION, SOLUTION INTRAVENOUS AS NEEDED
Status: DISCONTINUED | OUTPATIENT
Start: 2021-05-13 | End: 2021-05-13 | Stop reason: SURG

## 2021-05-13 RX ORDER — HYDROMORPHONE HYDROCHLORIDE 1 MG/ML
0.5 INJECTION, SOLUTION INTRAMUSCULAR; INTRAVENOUS; SUBCUTANEOUS
Status: DISCONTINUED | OUTPATIENT
Start: 2021-05-13 | End: 2021-05-13 | Stop reason: HOSPADM

## 2021-05-13 RX ORDER — ONDANSETRON 2 MG/ML
4 INJECTION INTRAMUSCULAR; INTRAVENOUS ONCE AS NEEDED
Status: DISCONTINUED | OUTPATIENT
Start: 2021-05-13 | End: 2021-05-13 | Stop reason: HOSPADM

## 2021-05-13 RX ORDER — BUPIVACAINE HYDROCHLORIDE 5 MG/ML
INJECTION, SOLUTION PERINEURAL AS NEEDED
Status: DISCONTINUED | OUTPATIENT
Start: 2021-05-13 | End: 2021-05-13 | Stop reason: HOSPADM

## 2021-05-13 RX ORDER — PROPOFOL 10 MG/ML
VIAL (ML) INTRAVENOUS AS NEEDED
Status: DISCONTINUED | OUTPATIENT
Start: 2021-05-13 | End: 2021-05-13 | Stop reason: SURG

## 2021-05-13 RX ORDER — FAMOTIDINE 10 MG/ML
20 INJECTION, SOLUTION INTRAVENOUS ONCE
Status: COMPLETED | OUTPATIENT
Start: 2021-05-13 | End: 2021-05-13

## 2021-05-13 RX ORDER — OXYCODONE HYDROCHLORIDE AND ACETAMINOPHEN 5; 325 MG/1; MG/1
1-2 TABLET ORAL EVERY 4 HOURS PRN
Qty: 12 TABLET | Refills: 0 | Status: SHIPPED | OUTPATIENT
Start: 2021-05-13 | End: 2021-05-27

## 2021-05-13 RX ORDER — LIDOCAINE HYDROCHLORIDE 20 MG/ML
INJECTION, SOLUTION INFILTRATION; PERINEURAL AS NEEDED
Status: DISCONTINUED | OUTPATIENT
Start: 2021-05-13 | End: 2021-05-13 | Stop reason: SURG

## 2021-05-13 RX ORDER — GLYCOPYRROLATE 0.2 MG/ML
INJECTION INTRAMUSCULAR; INTRAVENOUS AS NEEDED
Status: DISCONTINUED | OUTPATIENT
Start: 2021-05-13 | End: 2021-05-13 | Stop reason: SURG

## 2021-05-13 RX ORDER — IBUPROFEN 600 MG/1
600 TABLET ORAL EVERY 6 HOURS PRN
Qty: 60 TABLET | Refills: 0 | Status: SHIPPED | OUTPATIENT
Start: 2021-05-13 | End: 2021-05-27

## 2021-05-13 RX ORDER — MIDAZOLAM HYDROCHLORIDE 1 MG/ML
1 INJECTION INTRAMUSCULAR; INTRAVENOUS
Status: DISCONTINUED | OUTPATIENT
Start: 2021-05-13 | End: 2021-05-13 | Stop reason: HOSPADM

## 2021-05-13 RX ORDER — EPHEDRINE SULFATE 50 MG/ML
INJECTION, SOLUTION INTRAVENOUS AS NEEDED
Status: DISCONTINUED | OUTPATIENT
Start: 2021-05-13 | End: 2021-05-13 | Stop reason: SURG

## 2021-05-13 RX ORDER — HYDROCODONE BITARTRATE AND ACETAMINOPHEN 7.5; 325 MG/1; MG/1
1 TABLET ORAL ONCE AS NEEDED
Status: DISCONTINUED | OUTPATIENT
Start: 2021-05-13 | End: 2021-05-13 | Stop reason: HOSPADM

## 2021-05-13 RX ORDER — ROCURONIUM BROMIDE 10 MG/ML
INJECTION, SOLUTION INTRAVENOUS AS NEEDED
Status: DISCONTINUED | OUTPATIENT
Start: 2021-05-13 | End: 2021-05-13 | Stop reason: SURG

## 2021-05-13 RX ORDER — LIDOCAINE HYDROCHLORIDE 10 MG/ML
0.5 INJECTION, SOLUTION EPIDURAL; INFILTRATION; INTRACAUDAL; PERINEURAL ONCE AS NEEDED
Status: DISCONTINUED | OUTPATIENT
Start: 2021-05-13 | End: 2021-05-13 | Stop reason: HOSPADM

## 2021-05-13 RX ORDER — ONDANSETRON 2 MG/ML
INJECTION INTRAMUSCULAR; INTRAVENOUS AS NEEDED
Status: DISCONTINUED | OUTPATIENT
Start: 2021-05-13 | End: 2021-05-13 | Stop reason: SURG

## 2021-05-13 RX ADMIN — KETOROLAC TROMETHAMINE 30 MG: 30 INJECTION, SOLUTION INTRAMUSCULAR at 07:42

## 2021-05-13 RX ADMIN — FENTANYL CITRATE 50 MCG: 50 INJECTION INTRAMUSCULAR; INTRAVENOUS at 07:03

## 2021-05-13 RX ADMIN — FENTANYL CITRATE 50 MCG: 50 INJECTION INTRAMUSCULAR; INTRAVENOUS at 07:41

## 2021-05-13 RX ADMIN — HYDROMORPHONE HYDROCHLORIDE 0.5 MG: 2 INJECTION, SOLUTION INTRAMUSCULAR; INTRAVENOUS; SUBCUTANEOUS at 08:00

## 2021-05-13 RX ADMIN — ONDANSETRON 4 MG: 2 INJECTION INTRAMUSCULAR; INTRAVENOUS at 07:37

## 2021-05-13 RX ADMIN — SODIUM CHLORIDE, POTASSIUM CHLORIDE, SODIUM LACTATE AND CALCIUM CHLORIDE 9 ML/HR: 600; 310; 30; 20 INJECTION, SOLUTION INTRAVENOUS at 06:44

## 2021-05-13 RX ADMIN — NEOSTIGMINE METHYLSULFATE 3 MG: 0.5 INJECTION INTRAVENOUS at 07:42

## 2021-05-13 RX ADMIN — DEXAMETHASONE SODIUM PHOSPHATE 8 MG: 10 INJECTION INTRAMUSCULAR; INTRAVENOUS at 07:08

## 2021-05-13 RX ADMIN — ROCURONIUM BROMIDE 25 MG: 50 INJECTION INTRAVENOUS at 07:03

## 2021-05-13 RX ADMIN — SCOPALAMINE 1 PATCH: 1 PATCH, EXTENDED RELEASE TRANSDERMAL at 06:45

## 2021-05-13 RX ADMIN — FAMOTIDINE 20 MG: 10 INJECTION INTRAVENOUS at 06:45

## 2021-05-13 RX ADMIN — EPHEDRINE SULFATE 10 MG: 50 INJECTION INTRAVENOUS at 07:06

## 2021-05-13 RX ADMIN — LIDOCAINE HYDROCHLORIDE 60 MG: 20 INJECTION, SOLUTION INFILTRATION; PERINEURAL at 07:03

## 2021-05-13 RX ADMIN — PROPOFOL 150 MG: 10 INJECTION, EMULSION INTRAVENOUS at 07:03

## 2021-05-13 RX ADMIN — GLYCOPYRROLATE 0.4 MG: 0.2 INJECTION INTRAMUSCULAR; INTRAVENOUS at 07:42

## 2021-05-13 NOTE — ANESTHESIA PREPROCEDURE EVALUATION
Anesthesia Evaluation     Patient summary reviewed and Nursing notes reviewed   NPO Solid Status: > 8 hours  NPO Liquid Status: > 2 hours           Airway   Mallampati: II  TM distance: >3 FB  Neck ROM: full  Dental - normal exam     Pulmonary - negative pulmonary ROS and normal exam    breath sounds clear to auscultation  Cardiovascular - negative cardio ROS and normal exam    Rhythm: regular  Rate: normal    (-) angina, orthopnea, PND, DEVLIN      Neuro/Psych  (+) headaches (Migraine),     GI/Hepatic/Renal/Endo    (+)   renal disease stones,     Musculoskeletal (-) negative ROS    Abdominal    Substance History - negative use     OB/GYN negative ob/gyn ROS         Other - negative ROS                       Anesthesia Plan    ASA 1     general     intravenous induction     Anesthetic plan, all risks, benefits, and alternatives have been provided, discussed and informed consent has been obtained with: patient.

## 2021-05-13 NOTE — ANESTHESIA POSTPROCEDURE EVALUATION
Patient: Julian Fleming    Procedure Summary     Date: 05/13/21 Room / Location:  [facility]    Anesthesia Start: 0653 Anesthesia Stop: 0807    Procedure: SALPINGECTOMY LAPAROSCOPIC (Bilateral Abdomen) Diagnosis:       Postpartum care and examination      (Postpartum care and examination [Z39.2])    Surgeons: Pat Crowell MD Provider: Kenton Calderon MD    Anesthesia Type: general ASA Status: 1          Anesthesia Type: general    Vitals  Vitals Value Taken Time   /59 05/13/21 0900   Temp 36.4 °C (97.5 °F) 05/13/21 0900   Pulse 58 05/13/21 0906   Resp 16 05/13/21 0900   SpO2 100 % 05/13/21 0907   Vitals shown include unvalidated device data.        Post Anesthesia Care and Evaluation    Patient location during evaluation: bedside  Patient participation: complete - patient participated  Level of consciousness: awake and alert  Pain management: adequate  Airway patency: patent  Anesthetic complications: No anesthetic complications    Cardiovascular status: acceptable  Respiratory status: acceptable  Hydration status: acceptable    Comments: /58 (BP Location: Left arm, Patient Position: Lying)   Pulse 63   Temp 36.4 °C (97.5 °F) (Temporal)   Resp 16   Wt 46.7 kg (102 lb 15.3 oz)   LMP 05/04/2021 (Approximate)   SpO2 100%   BMI 19.46 kg/m²

## 2021-05-14 ENCOUNTER — TELEPHONE (OUTPATIENT)
Dept: OBSTETRICS AND GYNECOLOGY | Facility: CLINIC | Age: 21
End: 2021-05-14

## 2021-05-14 LAB
CYTO UR: NORMAL
LAB AP CASE REPORT: NORMAL
PATH REPORT.FINAL DX SPEC: NORMAL
PATH REPORT.GROSS SPEC: NORMAL

## 2021-05-27 ENCOUNTER — OFFICE VISIT (OUTPATIENT)
Dept: OBSTETRICS AND GYNECOLOGY | Facility: CLINIC | Age: 21
End: 2021-05-27

## 2021-05-27 VITALS
BODY MASS INDEX: 18.88 KG/M2 | SYSTOLIC BLOOD PRESSURE: 103 MMHG | HEIGHT: 61 IN | HEART RATE: 81 BPM | WEIGHT: 100 LBS | DIASTOLIC BLOOD PRESSURE: 70 MMHG

## 2021-05-27 DIAGNOSIS — Z98.890 POST-OPERATIVE STATE: Primary | ICD-10-CM

## 2021-05-27 PROCEDURE — 99024 POSTOP FOLLOW-UP VISIT: CPT | Performed by: OBSTETRICS & GYNECOLOGY

## 2021-05-27 NOTE — PROGRESS NOTES
"Chief Complaint   Patient presents with   • Post-op     from tubal       Julian Lonnie is a 21 y.o. female who presents to the clinic 2 weeks status post laparoscopic bilateral salpingectomy for requested sterilization.     Eating a regular diet without difficulty.  The patient is not having any pain. No vaginal bleeding    Past Medical History:   Diagnosis Date   • History of anemia     DURING PREGNANCY   • History of kidney stones 12/2020   • Migraine      Past Surgical History:   Procedure Laterality Date   • DIAGNOSTIC LAPAROSCOPY Bilateral 5/13/2021    Procedure: SALPINGECTOMY LAPAROSCOPIC;  Surgeon: Pat Crowell MD;  [facility]: [facility];  Service: Obstetrics/Gynecology;  Laterality: Bilateral;   • NO PAST SURGERIES       Social History     Tobacco Use   • Smoking status: Never Smoker   • Smokeless tobacco: Never Used   Vaping Use   • Vaping Use: Every day   • Substances: Nicotine   Substance Use Topics   • Alcohol use: Yes     Comment: SOCIAL   • Drug use: No     Family History   Problem Relation Age of Onset   • Breast cancer Neg Hx    • Ovarian cancer Neg Hx    • Uterine cancer Neg Hx    • Colon cancer Neg Hx    • Deep vein thrombosis Neg Hx    • Pulmonary embolism Neg Hx    • Malig Hyperthermia Neg Hx          Review of Systems   Constitutional: Negative.    HENT: Negative.    Respiratory: Negative.    Cardiovascular: Negative.    Gastrointestinal: Negative.    Endocrine: Negative.    Genitourinary: Negative.    Musculoskeletal: Negative.    Skin: Negative.    Neurological: Negative.    Psychiatric/Behavioral: Negative.        OBJECTIVE:   Vitals:    05/27/21 1527   BP: 103/70   Pulse: 81   Weight: 45.4 kg (100 lb)   Height: 154.9 cm (60.98\")        Physical Exam  Constitutional:       General: She is not in acute distress.     Appearance: She is well-developed. She is not diaphoretic.   HENT:      Head: Normocephalic and atraumatic.   Eyes:      General: No scleral icterus.     Extraocular " Movements: Extraocular movements intact.   Pulmonary:      Effort: Pulmonary effort is normal. No respiratory distress.   Abdominal:      Comments: Incisions clean and dry, no erythema/induration/drainage  Dermabond in place   Skin:     General: Skin is warm and dry.   Neurological:      General: No focal deficit present.      Mental Status: She is alert and oriented to person, place, and time.   Psychiatric:         Mood and Affect: Mood normal.         Behavior: Behavior normal.         Thought Content: Thought content normal.         Judgment: Judgment normal.         ASSESSMENT:  S/p tubal ligation    PLAN:   Reviewed postop instructions, pathology and intra-op pictures   Pain control adequate  Incisions healing well   RTO for annual exam

## 2022-10-04 ENCOUNTER — TELEPHONE (OUTPATIENT)
Dept: OBSTETRICS AND GYNECOLOGY | Facility: CLINIC | Age: 22
End: 2022-10-04

## 2024-10-21 NOTE — NON STRESS TEST
Julian Fleming, a  at 28w4d with an PILLO of 5/3/2019, by Last Menstrual Period, was seen at [facility] for a nonstress test.    Chief Complaint   Patient presents with   • Abdominal Pain     lower R abd pain that began wednesday. pt reports pain is intermittant. denies LOF, vag bleeding, +FM        Patient Active Problem List   Diagnosis   • Normal pregnancy   • Migraine   • UTI in pregnancy   • Chlamydia   • Asymptomatic bacteriuria during pregnancy   • Pyelonephritis affecting pregnancy in third trimester   • Anemia during pregnancy       Start Time:853  Stop Time: 932  Interpretation A  Nonstress Test Interpretation A: Reactive (19 : Melisa Lehman, RN)           Ambulatory Care Coordination Note     10/21/2024 3:19 PM     ACM outreach attempt by this ACM today to offer care management services. ACM was unable to reach the patient by telephone today; left voice message requesting a return phone call to this ACM.     ACM: Krystyna Cuello RN         PCP/Specialist follow up:   Future Appointments         Provider Specialty Dept Phone    10/23/2024 3:45 PM Fede Manrique DO Internal Medicine [phone]    11/21/2024 10:00 AM April Sebastian MD Internal Medicine [phone]            Follow Up:   Plan for next ACM outreach in approximately 1-2 days  to complete:  - outreach attempt to offer care management services.

## (undated) DEVICE — APPL CHLORAPREP HI/LITE 26ML ORNG

## (undated) DEVICE — UNDYED BRAIDED (POLYGLACTIN 910), SYNTHETIC ABSORBABLE SUTURE: Brand: COATED VICRYL

## (undated) DEVICE — SOL NACL 0.9PCT 1000ML

## (undated) DEVICE — ENDOPATH XCEL DILATING TIP TROCARS WITH STABILITY SLEEVES: Brand: ENDOPATH XCEL

## (undated) DEVICE — ENDOPATH XCEL BLUNT TIP TROCARS WITH SMOOTH SLEEVES: Brand: ENDOPATH XCEL

## (undated) DEVICE — 30977 SEE SHARP - ENHANCED INTRAOPERATIVE LAPAROSCOPE CLEANING & DEFOGGING: Brand: 30977 SEE SHARP - ENHANCED INTRAOPERATIVE LAPAROSCOPE CLEANING & DEFOGGING

## (undated) DEVICE — SKIN PREP TRAY W/CHG: Brand: MEDLINE INDUSTRIES, INC.

## (undated) DEVICE — PK LAP GYN TOWER 40

## (undated) DEVICE — ADHS SKIN SURG TISS VISC PREMIERPRO EXOFIN HI/VISC FAST/DRY

## (undated) DEVICE — GLV SURG SENSICARE POLYISPRN W/ALOE PF LF 6.5 GRN STRL

## (undated) DEVICE — ANTIBACTERIAL UNDYED BRAIDED (POLYGLACTIN 910), SYNTHETIC ABSORBABLE SUTURE: Brand: COATED VICRYL

## (undated) DEVICE — MARYLAND JAW LAPAROSCOPIC SEALER/DIVIDER COATED: Brand: LIGASURE

## (undated) DEVICE — GLV SURG BIOGEL LTX PF 6

## (undated) DEVICE — PATIENT RETURN ELECTRODE, SINGLE-USE, CONTACT QUALITY MONITORING, ADULT, WITH 9FT CORD, FOR PATIENTS WEIGING OVER 33LBS. (15KG): Brand: MEGADYNE

## (undated) DEVICE — ENDOCUT SCISSOR TIP, DISPOSABLE: Brand: RENEW